# Patient Record
Sex: MALE | Race: WHITE | Employment: FULL TIME | URBAN - METROPOLITAN AREA
[De-identification: names, ages, dates, MRNs, and addresses within clinical notes are randomized per-mention and may not be internally consistent; named-entity substitution may affect disease eponyms.]

---

## 2017-07-07 ENCOUNTER — HOSPITAL ENCOUNTER (EMERGENCY)
Age: 22
Discharge: HOME | End: 2017-07-08
Attending: EMERGENCY MEDICINE
Payer: MEDICARE

## 2017-07-07 DIAGNOSIS — I49.3 FREQUENT PVCS: Primary | ICD-10-CM

## 2017-07-07 LAB
ABSOLUTE EOS #: 0.4 K/UL (ref 0–0.4)
ABSOLUTE LYMPH #: 3 K/UL (ref 1–4.8)
ABSOLUTE MONO #: 0.9 K/UL (ref 0.1–1.2)
BASOPHILS # BLD: 1 %
BASOPHILS ABSOLUTE: 0.1 K/UL (ref 0–0.2)
DIFFERENTIAL TYPE: ABNORMAL
EOSINOPHILS RELATIVE PERCENT: 5 %
HCT VFR BLD CALC: 47.9 % (ref 41–53)
HEMOGLOBIN: 16.3 G/DL (ref 13.5–17.5)
LYMPHOCYTES # BLD: 35 %
MCH RBC QN AUTO: 27.1 PG (ref 26–34)
MCHC RBC AUTO-ENTMCNC: 33.9 G/DL (ref 31–37)
MCV RBC AUTO: 79.9 FL (ref 80–100)
MONOCYTES # BLD: 11 %
PDW BLD-RTO: 13 % (ref 12.5–15.4)
PLATELET # BLD: 187 K/UL (ref 140–450)
PLATELET ESTIMATE: ABNORMAL
PMV BLD AUTO: 9.3 FL (ref 6–12)
RBC # BLD: 6 M/UL (ref 4.5–5.9)
RBC # BLD: ABNORMAL 10*6/UL
SEG NEUTROPHILS: 48 %
SEGMENTED NEUTROPHILS ABSOLUTE COUNT: 4.2 K/UL (ref 1.8–7.7)
WBC # BLD: 8.5 K/UL (ref 3.5–11)
WBC # BLD: ABNORMAL 10*3/UL

## 2017-07-07 PROCEDURE — 93005 ELECTROCARDIOGRAM TRACING: CPT

## 2017-07-07 PROCEDURE — 99285 EMERGENCY DEPT VISIT HI MDM: CPT

## 2017-07-07 PROCEDURE — 6370000000 HC RX 637 (ALT 250 FOR IP): Performed by: EMERGENCY MEDICINE

## 2017-07-07 PROCEDURE — 36415 COLL VENOUS BLD VENIPUNCTURE: CPT

## 2017-07-07 PROCEDURE — 85025 COMPLETE CBC W/AUTO DIFF WBC: CPT

## 2017-07-07 PROCEDURE — 96360 HYDRATION IV INFUSION INIT: CPT

## 2017-07-07 PROCEDURE — 84484 ASSAY OF TROPONIN QUANT: CPT

## 2017-07-07 PROCEDURE — 2580000003 HC RX 258: Performed by: EMERGENCY MEDICINE

## 2017-07-07 PROCEDURE — 80048 BASIC METABOLIC PNL TOTAL CA: CPT

## 2017-07-07 PROCEDURE — 83874 ASSAY OF MYOGLOBIN: CPT

## 2017-07-07 RX ORDER — ASPIRIN 81 MG/1
324 TABLET, CHEWABLE ORAL ONCE
Status: COMPLETED | OUTPATIENT
Start: 2017-07-07 | End: 2017-07-07

## 2017-07-07 RX ORDER — 0.9 % SODIUM CHLORIDE 0.9 %
1000 INTRAVENOUS SOLUTION INTRAVENOUS ONCE
Status: COMPLETED | OUTPATIENT
Start: 2017-07-08 | End: 2017-07-08

## 2017-07-07 RX ADMIN — ASPIRIN 81 MG 324 MG: 81 TABLET ORAL at 23:46

## 2017-07-07 RX ADMIN — SODIUM CHLORIDE 1000 ML: 9 INJECTION, SOLUTION INTRAVENOUS at 23:50

## 2017-07-07 ASSESSMENT — ENCOUNTER SYMPTOMS
NAUSEA: 0
BACK PAIN: 0
EYE DISCHARGE: 0
DIARRHEA: 0
EYE REDNESS: 0
CONSTIPATION: 0
SHORTNESS OF BREATH: 0
WHEEZING: 0
RHINORRHEA: 0
COUGH: 0
COLOR CHANGE: 0
ABDOMINAL PAIN: 0
EYE PAIN: 0
SORE THROAT: 0
CHEST TIGHTNESS: 0

## 2017-07-08 ENCOUNTER — APPOINTMENT (OUTPATIENT)
Dept: GENERAL RADIOLOGY | Age: 22
End: 2017-07-08
Payer: MEDICARE

## 2017-07-08 VITALS
SYSTOLIC BLOOD PRESSURE: 128 MMHG | DIASTOLIC BLOOD PRESSURE: 60 MMHG | BODY MASS INDEX: 33.13 KG/M2 | HEART RATE: 62 BPM | WEIGHT: 250 LBS | HEIGHT: 73 IN | RESPIRATION RATE: 23 BRPM | OXYGEN SATURATION: 92 % | TEMPERATURE: 98.8 F

## 2017-07-08 LAB
ANION GAP SERPL CALCULATED.3IONS-SCNC: 17 MMOL/L (ref 9–17)
BUN BLDV-MCNC: 10 MG/DL (ref 6–20)
BUN/CREAT BLD: ABNORMAL (ref 9–20)
CALCIUM SERPL-MCNC: 9 MG/DL (ref 8.6–10.4)
CHLORIDE BLD-SCNC: 102 MMOL/L (ref 98–107)
CO2: 25 MMOL/L (ref 20–31)
CREAT SERPL-MCNC: 0.88 MG/DL (ref 0.7–1.2)
EKG ATRIAL RATE: 49 BPM
EKG ATRIAL RATE: 72 BPM
EKG P AXIS: 42 DEGREES
EKG P AXIS: 43 DEGREES
EKG P-R INTERVAL: 126 MS
EKG P-R INTERVAL: 146 MS
EKG Q-T INTERVAL: 390 MS
EKG Q-T INTERVAL: 438 MS
EKG QRS DURATION: 112 MS
EKG QRS DURATION: 116 MS
EKG QTC CALCULATION (BAZETT): 395 MS
EKG QTC CALCULATION (BAZETT): 427 MS
EKG R AXIS: 28 DEGREES
EKG R AXIS: 39 DEGREES
EKG T AXIS: 48 DEGREES
EKG T AXIS: 69 DEGREES
EKG VENTRICULAR RATE: 49 BPM
EKG VENTRICULAR RATE: 72 BPM
GFR AFRICAN AMERICAN: >60 ML/MIN
GFR NON-AFRICAN AMERICAN: >60 ML/MIN
GFR SERPL CREATININE-BSD FRML MDRD: ABNORMAL ML/MIN/{1.73_M2}
GFR SERPL CREATININE-BSD FRML MDRD: ABNORMAL ML/MIN/{1.73_M2}
GLUCOSE BLD-MCNC: 103 MG/DL (ref 70–99)
MYOGLOBIN: 36 NG/ML (ref 28–72)
MYOGLOBIN: 57 NG/ML (ref 28–72)
POTASSIUM SERPL-SCNC: 4 MMOL/L (ref 3.7–5.3)
SODIUM BLD-SCNC: 144 MMOL/L (ref 135–144)
TROPONIN INTERP: NORMAL
TROPONIN INTERP: NORMAL
TROPONIN T: <0.03 NG/ML
TROPONIN T: <0.03 NG/ML

## 2017-07-08 PROCEDURE — 36415 COLL VENOUS BLD VENIPUNCTURE: CPT

## 2017-07-08 PROCEDURE — 93005 ELECTROCARDIOGRAM TRACING: CPT

## 2017-07-08 PROCEDURE — 83874 ASSAY OF MYOGLOBIN: CPT

## 2017-07-08 PROCEDURE — 71010 XR CHEST PORTABLE: CPT

## 2017-07-08 PROCEDURE — 2580000003 HC RX 258: Performed by: EMERGENCY MEDICINE

## 2017-07-08 PROCEDURE — 96361 HYDRATE IV INFUSION ADD-ON: CPT

## 2017-07-08 PROCEDURE — 84484 ASSAY OF TROPONIN QUANT: CPT

## 2017-07-08 RX ORDER — 0.9 % SODIUM CHLORIDE 0.9 %
1000 INTRAVENOUS SOLUTION INTRAVENOUS ONCE
Status: COMPLETED | OUTPATIENT
Start: 2017-07-08 | End: 2017-07-08

## 2017-07-08 RX ADMIN — SODIUM CHLORIDE 1000 ML: 9 INJECTION, SOLUTION INTRAVENOUS at 00:53

## 2017-07-14 ENCOUNTER — HOSPITAL ENCOUNTER (EMERGENCY)
Age: 22
Discharge: HOME | End: 2017-07-14
Attending: EMERGENCY MEDICINE
Payer: MEDICARE

## 2017-07-14 VITALS
HEART RATE: 94 BPM | TEMPERATURE: 99.7 F | WEIGHT: 250 LBS | DIASTOLIC BLOOD PRESSURE: 83 MMHG | BODY MASS INDEX: 33.13 KG/M2 | RESPIRATION RATE: 18 BRPM | OXYGEN SATURATION: 100 % | SYSTOLIC BLOOD PRESSURE: 146 MMHG | HEIGHT: 73 IN

## 2017-07-14 DIAGNOSIS — J02.9 ACUTE PHARYNGITIS, UNSPECIFIED ETIOLOGY: Primary | ICD-10-CM

## 2017-07-14 LAB
DIRECT EXAM: NORMAL
Lab: NORMAL
SPECIMEN DESCRIPTION: NORMAL
STATUS: NORMAL

## 2017-07-14 PROCEDURE — 87651 STREP A DNA AMP PROBE: CPT

## 2017-07-14 PROCEDURE — 6370000000 HC RX 637 (ALT 250 FOR IP): Performed by: EMERGENCY MEDICINE

## 2017-07-14 PROCEDURE — 99283 EMERGENCY DEPT VISIT LOW MDM: CPT

## 2017-07-14 RX ORDER — IBUPROFEN 600 MG/1
600 TABLET ORAL ONCE
Status: COMPLETED | OUTPATIENT
Start: 2017-07-14 | End: 2017-07-14

## 2017-07-14 RX ORDER — PREDNISONE 20 MG/1
60 TABLET ORAL ONCE
Status: COMPLETED | OUTPATIENT
Start: 2017-07-14 | End: 2017-07-14

## 2017-07-14 RX ADMIN — IBUPROFEN 600 MG: 600 TABLET, FILM COATED ORAL at 23:03

## 2017-07-14 RX ADMIN — PREDNISONE 60 MG: 20 TABLET ORAL at 23:18

## 2017-07-14 ASSESSMENT — PAIN DESCRIPTION - DESCRIPTORS: DESCRIPTORS: SORE

## 2017-07-14 ASSESSMENT — PAIN SCALES - GENERAL: PAINLEVEL_OUTOF10: 5

## 2017-07-14 ASSESSMENT — PAIN DESCRIPTION - LOCATION: LOCATION: THROAT;BACK

## 2017-07-16 LAB
DIRECT EXAM: NORMAL
DIRECT EXAM: NORMAL
Lab: NORMAL
SPECIMEN DESCRIPTION: NORMAL
SPECIMEN DESCRIPTION: NORMAL
STATUS: NORMAL

## 2017-07-16 ASSESSMENT — ENCOUNTER SYMPTOMS
COLOR CHANGE: 0
CHEST TIGHTNESS: 0
DIARRHEA: 0
COUGH: 0
SHORTNESS OF BREATH: 0
RHINORRHEA: 0
WHEEZING: 0
EYE REDNESS: 0
EYE PAIN: 0
EYE DISCHARGE: 0
ABDOMINAL PAIN: 0
NAUSEA: 0
BACK PAIN: 0
SORE THROAT: 1
CONSTIPATION: 0

## 2017-11-15 ENCOUNTER — HOSPITAL ENCOUNTER (EMERGENCY)
Age: 22
Discharge: HOME | End: 2017-11-15
Attending: EMERGENCY MEDICINE
Payer: MEDICARE

## 2017-11-15 VITALS
WEIGHT: 244 LBS | HEIGHT: 73 IN | SYSTOLIC BLOOD PRESSURE: 157 MMHG | DIASTOLIC BLOOD PRESSURE: 80 MMHG | HEART RATE: 67 BPM | OXYGEN SATURATION: 96 % | TEMPERATURE: 98.4 F | RESPIRATION RATE: 16 BRPM | BODY MASS INDEX: 32.34 KG/M2

## 2017-11-15 DIAGNOSIS — R10.31 RIGHT GROIN PAIN: Primary | ICD-10-CM

## 2017-11-15 PROCEDURE — 99283 EMERGENCY DEPT VISIT LOW MDM: CPT

## 2017-11-15 PROCEDURE — 6370000000 HC RX 637 (ALT 250 FOR IP): Performed by: PHYSICIAN ASSISTANT

## 2017-11-15 RX ORDER — ASPIRIN 81 MG/1
81 TABLET, CHEWABLE ORAL DAILY
COMMUNITY
End: 2018-09-14

## 2017-11-15 RX ORDER — IBUPROFEN 600 MG/1
600 TABLET ORAL ONCE
Status: COMPLETED | OUTPATIENT
Start: 2017-11-15 | End: 2017-11-15

## 2017-11-15 RX ADMIN — IBUPROFEN 600 MG: 600 TABLET, FILM COATED ORAL at 14:24

## 2017-11-15 ASSESSMENT — ENCOUNTER SYMPTOMS
EYE DISCHARGE: 0
VOMITING: 0
COUGH: 0
SORE THROAT: 0
NAUSEA: 0
SHORTNESS OF BREATH: 0
BACK PAIN: 0
ABDOMINAL PAIN: 0
EYE REDNESS: 0

## 2017-11-15 ASSESSMENT — PAIN DESCRIPTION - ORIENTATION
ORIENTATION: RIGHT
ORIENTATION: RIGHT

## 2017-11-15 ASSESSMENT — PAIN DESCRIPTION - PAIN TYPE: TYPE: ACUTE PAIN

## 2017-11-15 ASSESSMENT — PAIN DESCRIPTION - LOCATION
LOCATION: GROIN
LOCATION: GROIN

## 2017-11-15 ASSESSMENT — PAIN SCALES - GENERAL
PAINLEVEL_OUTOF10: 6
PAINLEVEL_OUTOF10: 6

## 2017-11-15 NOTE — ED NOTES
PATIENT CLEARED FOR D/C PER Beto Carmona DO.  I HAVE REVIEWED THE PROVIDER'S INSTRUCTIONS WITH THE PATIENT, ANSWERING ALL QUESTIONS TO his SATISFACTION, RECOMMENDED F/U APPT  GIVEN TO him AND he VERBALIZES UNDERSTANDING OF INSTRUCTIONS.   D/C CONDITION IMPROVED/STABLE TO [facility] ED WITH INSTRUCTIONS       Sue Marcano RN  11/15/17 0979

## 2017-11-15 NOTE — ED PROVIDER NOTES
[facility] ED  [address] [facility] [address]  Phone: [phone]  Fax: [phone]      Pt Name: Stephen Godoy  MRN: [id number]  [place] 1995  Date of evaluation: 11/15/2017      CHIEF COMPLAINT       Chief Complaint   Patient presents with    Hematoma       HISTORY OF PRESENT ILLNESS   (Location, Quality, Severity, Duration, Timing, Context, Modifying Factors, Associated Signs and Symptoms)     Stephen Godoy is a 25 y.o. male who presents to the ER for evaluation of a tender lump in the right groin. Patient had a history of frequent PVCs. He underwent cardiac ablation at the [facility] on 11/10/17. Patient states that he has noticed a tender lump in the right groin. He believes that the lump is increasing in size. He expresses concern for possible blood clot in the leg. Patient denies chest pain and difficulty breathing. There has been no swelling in the lower extremities. Patient is a nonsmoker. He has no history of DVT or PE. Patient is currently on aspirin 81 mg daily. He rates his current right groin discomfort at 6-7/10. He states that he took Tylenol for the groin pain around 10:30 AM this morning with no relief. Nursing Notes were reviewed. REVIEW OF SYSTEMS     (2-9 systems for level 4, 10 or more for level 5)    Review of Systems   Constitutional: Negative for appetite change, chills and fever. HENT: Negative for congestion, ear pain and sore throat. Eyes: Negative for discharge and redness. Respiratory: Negative for cough and shortness of breath. Cardiovascular: Negative for chest pain. Gastrointestinal: Negative for abdominal pain, nausea and vomiting. Genitourinary: Negative for dysuria and frequency. Musculoskeletal: Negative for back pain and neck pain. Groin pain. Skin: Negative for rash. Neurological: Negative for seizures and syncope.    Psychiatric/Behavioral: Negative for self-injury and suicidal Medications    ibuprofen (ADVIL;MOTRIN) tablet 600 mg      Re-evaluation Notes  2:26 PM.  Call placed to [address] [place] [address]. 2:35 PM.  Patient was discussed with Dr. Yvonne Obando. He has accepted the patient for ER to ER transfer. FINAL IMPRESSION      1. Right groin pain        DISPOSITION/PLAN   DISPOSITION Decision to Transfer-     Condition on Disposition  Stable    PATIENT REFERRED TO:  No follow-up provider specified.     DISCHARGE MEDICATIONS:  New Prescriptions    No medications on file     (Please note that portions of this note were completed with a voice recognition program.  Efforts were made to edit the dictations but occasionally words are mis-transcribed.)    Bismark Sullivan PA-C  11/15/17 0232

## 2017-11-15 NOTE — ED PROVIDER NOTES
Emergency Department     Faculty Attestation    I performed a history and physical examination of the patient and discussed management with the mid level provideer. I reviewed the mid level provider's note and agree with the documented findings and plan of care. Any areas of disagreement are noted on the chart. I was personally present for the key portions of any procedures. I have documented in the chart those procedures where I was not present during the key portions. I have reviewed the emergency nurses triage note. I agree with the chief complaint, past medical history, past surgical history, allergies, medications, social and family history as documented unless otherwise noted below. Documentation of the HPI, Physical Exam and Medical Decision Making performed by medical students or scribes is based on my personal performance of the HPI, PE and MDM. For Physician Assistant/ Nurse Practitioner cases/documentation I have personally evaluated this patient and have completed at least one if not all key elements of the E/M (history, physical exam, and MDM). Additional findings are as noted. CHIEF COMPLAINT       Chief Complaint   Patient presents with    Hematoma      PHYSICAL EXAM      Constitutional: Alert, oriented x3, nontoxic, answering questions appropriately, acting properly for age, in no acute distress   HEENT: Extraocular muscles intact, mucous membranes moist.     Neck: Trachea midline,    Musculoskeletal: Right lower extremity ecchymosis of the right inguinal area with a 4 cm mass appreciated over the femoral artery Nonpulsatile. No bruit. Moderately tender. Pedal pulses intact distally.   Neurologic: Moving all 4 extremities without difficulty   Skin: Warm and dry     DIAGNOSTIC RESULTS     EKG: All EKG's are interpreted by the Emergency Department Physician who either signs or Co-signs this chart in the absence of a cardiologist.    Not indicated unless otherwise documented above

## 2017-12-19 ENCOUNTER — HOSPITAL ENCOUNTER (OUTPATIENT)
Dept: MRI IMAGING | Age: 22
Discharge: HOME | End: 2017-12-19
Payer: MEDICARE

## 2017-12-19 DIAGNOSIS — M54.50 CHRONIC LEFT-SIDED LOW BACK PAIN WITHOUT SCIATICA: ICD-10-CM

## 2017-12-19 DIAGNOSIS — G89.29 CHRONIC LEFT-SIDED LOW BACK PAIN WITHOUT SCIATICA: ICD-10-CM

## 2017-12-19 PROCEDURE — 72148 MRI LUMBAR SPINE W/O DYE: CPT

## 2018-01-22 ENCOUNTER — HOSPITAL ENCOUNTER (EMERGENCY)
Age: 23
Discharge: HOME | End: 2018-01-23
Attending: EMERGENCY MEDICINE
Payer: MEDICARE

## 2018-01-22 VITALS
WEIGHT: 255 LBS | HEIGHT: 73 IN | RESPIRATION RATE: 16 BRPM | HEART RATE: 76 BPM | BODY MASS INDEX: 33.8 KG/M2 | TEMPERATURE: 100.9 F | OXYGEN SATURATION: 97 % | SYSTOLIC BLOOD PRESSURE: 149 MMHG | DIASTOLIC BLOOD PRESSURE: 78 MMHG

## 2018-01-22 DIAGNOSIS — J06.9 ACUTE UPPER RESPIRATORY INFECTION: Primary | ICD-10-CM

## 2018-01-22 DIAGNOSIS — R52 BODY ACHES: ICD-10-CM

## 2018-01-22 DIAGNOSIS — J02.9 SORE THROAT: ICD-10-CM

## 2018-01-22 PROCEDURE — 99283 EMERGENCY DEPT VISIT LOW MDM: CPT

## 2018-01-22 PROCEDURE — 87804 INFLUENZA ASSAY W/OPTIC: CPT

## 2018-01-22 PROCEDURE — 87880 STREP A ASSAY W/OPTIC: CPT

## 2018-01-22 ASSESSMENT — PAIN SCALES - GENERAL: PAINLEVEL_OUTOF10: 6

## 2018-01-22 ASSESSMENT — PAIN DESCRIPTION - DESCRIPTORS: DESCRIPTORS: ACHING

## 2018-01-22 ASSESSMENT — PAIN DESCRIPTION - LOCATION: LOCATION: BACK;THROAT

## 2018-01-23 LAB
DIRECT EXAM: NORMAL
Lab: NORMAL
SPECIMEN DESCRIPTION: NORMAL
STATUS: NORMAL

## 2018-01-23 PROCEDURE — 6360000002 HC RX W HCPCS: Performed by: EMERGENCY MEDICINE

## 2018-01-23 PROCEDURE — 6370000000 HC RX 637 (ALT 250 FOR IP): Performed by: EMERGENCY MEDICINE

## 2018-01-23 PROCEDURE — 87651 STREP A DNA AMP PROBE: CPT

## 2018-01-23 PROCEDURE — 96372 THER/PROPH/DIAG INJ SC/IM: CPT

## 2018-01-23 RX ORDER — BENZONATATE 100 MG/1
100 CAPSULE ORAL 3 TIMES DAILY PRN
Qty: 30 CAPSULE | Refills: 0 | Status: SHIPPED | OUTPATIENT
Start: 2018-01-23 | End: 2018-01-30

## 2018-01-23 RX ORDER — KETOROLAC TROMETHAMINE 30 MG/ML
30 INJECTION, SOLUTION INTRAMUSCULAR; INTRAVENOUS ONCE
Status: COMPLETED | OUTPATIENT
Start: 2018-01-23 | End: 2018-01-23

## 2018-01-23 RX ORDER — BENZONATATE 100 MG/1
100 CAPSULE ORAL ONCE
Status: COMPLETED | OUTPATIENT
Start: 2018-01-23 | End: 2018-01-23

## 2018-01-23 RX ORDER — DEXAMETHASONE SODIUM PHOSPHATE 10 MG/ML
6 INJECTION INTRAMUSCULAR; INTRAVENOUS ONCE
Status: COMPLETED | OUTPATIENT
Start: 2018-01-23 | End: 2018-01-23

## 2018-01-23 RX ADMIN — BENZONATATE 100 MG: 100 CAPSULE ORAL at 01:07

## 2018-01-23 RX ADMIN — KETOROLAC TROMETHAMINE 30 MG: 30 INJECTION, SOLUTION INTRAMUSCULAR at 01:06

## 2018-01-23 RX ADMIN — DEXAMETHASONE SODIUM PHOSPHATE 6 MG: 10 INJECTION INTRAMUSCULAR; INTRAVENOUS at 01:07

## 2018-01-23 ASSESSMENT — ENCOUNTER SYMPTOMS
COUGH: 1
EYE DISCHARGE: 0
SHORTNESS OF BREATH: 0
ABDOMINAL DISTENTION: 0
FACIAL SWELLING: 0
ABDOMINAL PAIN: 0
EYE REDNESS: 0
CHEST TIGHTNESS: 0
SORE THROAT: 1

## 2018-01-23 ASSESSMENT — PAIN SCALES - GENERAL: PAINLEVEL_OUTOF10: 6

## 2018-01-23 NOTE — ED PROVIDER NOTES
[address] [facility] ED  eMERGENCY dEPARTMENT eNCOUnter      Pt Name: Rossi Beltran  MRN: [id number]  [place] 1995  Date of evaluation: 1/22/2018  Provider: [address]       Chief Complaint   Patient presents with    Influenza     fever, H.A., congested, bodyaches         HISTORY OF PRESENT ILLNESS  (Location/Symptom, Timing/Onset, Context/Setting, Quality, Duration, Modifying Factors, Severity.)   Rossi Beltran is a 25 y.o. male who presents to the emergency department Patient complaining of 3-4 days of flulike symptoms. The patient is complaining of cough congestion fever and bodyaches. The patient is taking over-the-counter medications with only mild relief. The patient denies any shortness of breath or chest pain. He has a nonproductive cough. The patient is able to eat and drink however it hurts his throat to swallow. The patient is making normal amount of urine and states his pain is an aching in his throat and a 6 out of 10. Patient is nontoxic in appearance and appears well-hydrated. Nursing Notes were reviewed. REVIEW OF SYSTEMS    (2-9 systems for level 4, 10 or more for level 5)     Review of Systems   Constitutional: Negative for chills and fatigue. HENT: Positive for sore throat. Negative for facial swelling. Eyes: Negative for discharge and redness. Respiratory: Positive for cough. Negative for chest tightness and shortness of breath. Cardiovascular: Negative for chest pain and palpitations. Gastrointestinal: Negative for abdominal distention and abdominal pain. Genitourinary: Negative for dysuria and hematuria. Musculoskeletal: Positive for myalgias. Negative for gait problem, joint swelling and neck pain. Skin: Negative for pallor and rash. Neurological: Negative for speech difficulty and headaches. All other systems reviewed and are negative.      Except as noted above the remainder of the review of systems was

## 2018-09-14 ENCOUNTER — HOSPITAL ENCOUNTER (EMERGENCY)
Age: 23
Discharge: HOME | End: 2018-09-14
Attending: EMERGENCY MEDICINE
Payer: MEDICARE

## 2018-09-14 VITALS
DIASTOLIC BLOOD PRESSURE: 83 MMHG | RESPIRATION RATE: 14 BRPM | HEART RATE: 95 BPM | HEIGHT: 73 IN | OXYGEN SATURATION: 97 % | SYSTOLIC BLOOD PRESSURE: 152 MMHG | TEMPERATURE: 98.1 F | BODY MASS INDEX: 33.13 KG/M2 | WEIGHT: 250 LBS

## 2018-09-14 DIAGNOSIS — J02.9 SORE THROAT: Primary | ICD-10-CM

## 2018-09-14 LAB
DIRECT EXAM: NORMAL
Lab: NORMAL
SPECIMEN DESCRIPTION: NORMAL
STATUS: NORMAL

## 2018-09-14 PROCEDURE — 6370000000 HC RX 637 (ALT 250 FOR IP): Performed by: PHYSICIAN ASSISTANT

## 2018-09-14 PROCEDURE — 99284 EMERGENCY DEPT VISIT MOD MDM: CPT

## 2018-09-14 PROCEDURE — 87880 STREP A ASSAY W/OPTIC: CPT

## 2018-09-14 RX ORDER — PREDNISONE 10 MG/1
40 TABLET ORAL DAILY
Qty: 12 TABLET | Refills: 0 | Status: SHIPPED | OUTPATIENT
Start: 2018-09-14 | End: 2018-09-14 | Stop reason: CLARIF

## 2018-09-14 RX ORDER — AMOXICILLIN 500 MG/1
500 CAPSULE ORAL 2 TIMES DAILY
Qty: 20 CAPSULE | Refills: 0 | Status: SHIPPED | OUTPATIENT
Start: 2018-09-14 | End: 2018-09-24

## 2018-09-14 RX ORDER — IBUPROFEN 800 MG/1
800 TABLET ORAL ONCE
Status: COMPLETED | OUTPATIENT
Start: 2018-09-14 | End: 2018-09-14

## 2018-09-14 RX ADMIN — IBUPROFEN 800 MG: 800 TABLET ORAL at 18:02

## 2018-09-14 ASSESSMENT — ENCOUNTER SYMPTOMS
SORE THROAT: 1
EYE REDNESS: 0
EYE DISCHARGE: 0
VOMITING: 0
SHORTNESS OF BREATH: 0
ABDOMINAL PAIN: 0
RHINORRHEA: 0
COUGH: 0
BACK PAIN: 0
NAUSEA: 0

## 2018-09-14 ASSESSMENT — PAIN SCALES - GENERAL
PAINLEVEL_OUTOF10: 6
PAINLEVEL_OUTOF10: 7

## 2018-09-14 ASSESSMENT — PAIN DESCRIPTION - PAIN TYPE: TYPE: ACUTE PAIN

## 2018-09-14 ASSESSMENT — PAIN DESCRIPTION - LOCATION: LOCATION: THROAT

## 2018-09-14 NOTE — ED PROVIDER NOTES
[facility]  [address] [address]  Phone: [phone]  Fax: [phone]      Pt Name: Anuradha Lima  MRN: [id number]  [place] 1995  Date of evaluation: 9/14/2018      CHIEF COMPLAINT       Chief Complaint   Patient presents with    Pharyngitis     x3 days       HISTORY OF PRESENT ILLNESS   (Location, Quality, Severity, Duration, Timing, Context, Modifying Factors, Associated Signs and Symptoms)     Anuradha Lima is a 21 y.o. male who presents to the ER for evaluation of sore throat and headache. Patient states that he has had symptoms over the past 3 days. His fiancé has been sick with similar symptoms. Patient has had no nasal congestion or cough. He states that he has felt warm followed by chills. He denies abdominal pain, nausea and vomiting. Patient took Excedrin for his sore throat and headache pain with no relief. He rates his acute pain at 6/10. Nursing Notes were reviewed. REVIEW OF SYSTEMS     (2-9 systems for level 4, 10 or more for level 5)    Review of Systems   Constitutional: Positive for chills. Negative for appetite change. HENT: Positive for sore throat. Negative for congestion, ear discharge, ear pain and rhinorrhea. Eyes: Negative for discharge and redness. Respiratory: Negative for cough and shortness of breath. Cardiovascular: Negative for chest pain. Gastrointestinal: Negative for abdominal pain, nausea and vomiting. Genitourinary: Negative for decreased urine volume. Musculoskeletal: Negative for back pain, neck pain and neck stiffness. Skin: Negative for rash. Neurological: Negative for seizures and syncope. All other systems reviewed and are negative. PAST MEDICAL HISTORY    has a past medical history of Back pain; Drug-induced mood disorder ([facility] [address] PVC (premature ventricular contraction); Seasonal allergies; and Torn earlobe.     SURGICAL HISTORY      has a past surgical history that includes
10 mg by mouth nightly. SODIUM CHLORIDE (OCEAN, BABY AYR) 0.65 % NASAL SPRAY    2 sprays by Nasal route as needed for Congestion       ALLERGIES     is allergic to cat hair extract. FAMILY HISTORY     indicated that his mother is alive. He indicated that his father is alive. He indicated that the status of his maternal grandfather is unknown. He indicated that the status of his paternal grandfather is unknown.      family history includes Coronary Art Dis in his maternal grandfather and paternal grandfather. SOCIAL HISTORY      reports that he quit smoking about 2 years ago. His smoking use included Cigarettes. He started smoking about 5 years ago. He has a 1.00 pack-year smoking history. He has quit using smokeless tobacco. His smokeless tobacco use included Snuff and Chew. He reports that he drinks alcohol. He reports that he uses drugs, including Cocaine, Marijuana, Opiates , and Other-see comments. DIAGNOSTIC RESULTS     EKG: All EKG's are interpreted by the Emergency Department Physician who either signs or Co-signs this chart in the absence of a cardiologist.      RADIOLOGY:   Non-plain film images such as CT, Ultrasound and MRI are read by the radiologist. Plain radiographic images are visualized and the radiologist interpretations are reviewed as follows: No orders to display       No results found. LABS:  Results for orders placed or performed during the hospital encounter of 09/14/18   Strep Screen Group A Throat   Result Value Ref Range    Specimen Description . THROAT     Special Requests NOT REPORTED     Direct Exam       Rapid Strep A negative. A negative Rapid Group A Strep Screen result does not    Direct Exam        rule out the possibility of Group A Streptococci in the specimen.  A Group A    Direct Exam  Strep DNA test is available upon request.     Status FINAL 09/14/2018          EMERGENCY DEPARTMENT COURSE:   Vitals:    Vitals:    09/14/18 1744   BP: (!) 152/83   Pulse: 95

## 2018-10-09 ENCOUNTER — HOSPITAL ENCOUNTER (EMERGENCY)
Age: 23
Discharge: HOME | End: 2018-10-09
Attending: SPECIALIST
Payer: MEDICARE

## 2018-10-09 VITALS
TEMPERATURE: 98.8 F | BODY MASS INDEX: 33.13 KG/M2 | SYSTOLIC BLOOD PRESSURE: 133 MMHG | RESPIRATION RATE: 17 BRPM | OXYGEN SATURATION: 95 % | DIASTOLIC BLOOD PRESSURE: 68 MMHG | WEIGHT: 250 LBS | HEART RATE: 65 BPM | HEIGHT: 73 IN

## 2018-10-09 DIAGNOSIS — K13.79 UVULAR EDEMA: Primary | ICD-10-CM

## 2018-10-09 LAB
ABSOLUTE EOS #: 0.2 K/UL (ref 0–0.4)
ABSOLUTE IMMATURE GRANULOCYTE: ABNORMAL K/UL (ref 0–0.3)
ABSOLUTE LYMPH #: 1.5 K/UL (ref 1–4.8)
ABSOLUTE MONO #: 0.7 K/UL (ref 0.1–1.2)
ANION GAP SERPL CALCULATED.3IONS-SCNC: 14 MMOL/L (ref 9–17)
BASOPHILS # BLD: 1 % (ref 0–2)
BASOPHILS ABSOLUTE: 0.1 K/UL (ref 0–0.2)
BUN BLDV-MCNC: 12 MG/DL (ref 6–20)
BUN/CREAT BLD: ABNORMAL (ref 9–20)
CALCIUM SERPL-MCNC: 9.5 MG/DL (ref 8.6–10.4)
CHLORIDE BLD-SCNC: 104 MMOL/L (ref 98–107)
CO2: 22 MMOL/L (ref 20–31)
CREAT SERPL-MCNC: 0.82 MG/DL (ref 0.7–1.2)
DIFFERENTIAL TYPE: ABNORMAL
DIRECT EXAM: NORMAL
EOSINOPHILS RELATIVE PERCENT: 3 % (ref 1–4)
GFR AFRICAN AMERICAN: >60 ML/MIN
GFR NON-AFRICAN AMERICAN: >60 ML/MIN
GFR SERPL CREATININE-BSD FRML MDRD: ABNORMAL ML/MIN/{1.73_M2}
GFR SERPL CREATININE-BSD FRML MDRD: ABNORMAL ML/MIN/{1.73_M2}
GLUCOSE BLD-MCNC: 108 MG/DL (ref 70–99)
HCT VFR BLD CALC: 48.2 % (ref 41–53)
HEMOGLOBIN: 16.3 G/DL (ref 13.5–17.5)
IMMATURE GRANULOCYTES: ABNORMAL %
LYMPHOCYTES # BLD: 20 % (ref 24–44)
Lab: NORMAL
MCH RBC QN AUTO: 27.2 PG (ref 26–34)
MCHC RBC AUTO-ENTMCNC: 33.7 G/DL (ref 31–37)
MCV RBC AUTO: 80.5 FL (ref 80–100)
MONOCYTES # BLD: 9 % (ref 2–11)
NRBC AUTOMATED: ABNORMAL PER 100 WBC
PDW BLD-RTO: 12.9 % (ref 12.5–15.4)
PLATELET # BLD: 199 K/UL (ref 140–450)
PLATELET ESTIMATE: ABNORMAL
PMV BLD AUTO: 9.3 FL (ref 6–12)
POTASSIUM SERPL-SCNC: 4.4 MMOL/L (ref 3.7–5.3)
RBC # BLD: 5.99 M/UL (ref 4.5–5.9)
RBC # BLD: ABNORMAL 10*6/UL
SEG NEUTROPHILS: 67 % (ref 36–66)
SEGMENTED NEUTROPHILS ABSOLUTE COUNT: 5.1 K/UL (ref 1.8–7.7)
SODIUM BLD-SCNC: 140 MMOL/L (ref 135–144)
SPECIMEN DESCRIPTION: NORMAL
STATUS: NORMAL
WBC # BLD: 7.6 K/UL (ref 3.5–11)
WBC # BLD: ABNORMAL 10*3/UL

## 2018-10-09 PROCEDURE — S0028 INJECTION, FAMOTIDINE, 20 MG: HCPCS | Performed by: SPECIALIST

## 2018-10-09 PROCEDURE — 87880 STREP A ASSAY W/OPTIC: CPT

## 2018-10-09 PROCEDURE — 96372 THER/PROPH/DIAG INJ SC/IM: CPT

## 2018-10-09 PROCEDURE — 2580000003 HC RX 258: Performed by: SPECIALIST

## 2018-10-09 PROCEDURE — 96374 THER/PROPH/DIAG INJ IV PUSH: CPT

## 2018-10-09 PROCEDURE — 6360000002 HC RX W HCPCS: Performed by: SPECIALIST

## 2018-10-09 PROCEDURE — 2500000003 HC RX 250 WO HCPCS: Performed by: SPECIALIST

## 2018-10-09 PROCEDURE — 96375 TX/PRO/DX INJ NEW DRUG ADDON: CPT

## 2018-10-09 PROCEDURE — 80048 BASIC METABOLIC PNL TOTAL CA: CPT

## 2018-10-09 PROCEDURE — 85025 COMPLETE CBC W/AUTO DIFF WBC: CPT

## 2018-10-09 PROCEDURE — 6370000000 HC RX 637 (ALT 250 FOR IP): Performed by: SPECIALIST

## 2018-10-09 PROCEDURE — 36415 COLL VENOUS BLD VENIPUNCTURE: CPT

## 2018-10-09 PROCEDURE — 99284 EMERGENCY DEPT VISIT MOD MDM: CPT

## 2018-10-09 RX ORDER — METHYLPREDNISOLONE SODIUM SUCCINATE 125 MG/2ML
125 INJECTION, POWDER, LYOPHILIZED, FOR SOLUTION INTRAMUSCULAR; INTRAVENOUS ONCE
Status: COMPLETED | OUTPATIENT
Start: 2018-10-09 | End: 2018-10-09

## 2018-10-09 RX ORDER — PREDNISONE 20 MG/1
20 TABLET ORAL 2 TIMES DAILY
Qty: 10 TABLET | Refills: 0 | Status: SHIPPED | OUTPATIENT
Start: 2018-10-09 | End: 2018-10-14

## 2018-10-09 RX ORDER — FAMOTIDINE 20 MG/1
20 TABLET, FILM COATED ORAL 2 TIMES DAILY
Qty: 10 TABLET | Refills: 0 | Status: SHIPPED | OUTPATIENT
Start: 2018-10-09 | End: 2019-11-19

## 2018-10-09 RX ORDER — DIPHENHYDRAMINE HCL 25 MG
50 TABLET ORAL ONCE
Status: COMPLETED | OUTPATIENT
Start: 2018-10-09 | End: 2018-10-09

## 2018-10-09 RX ORDER — EPINEPHRINE 1 MG/ML
0.5 INJECTION, SOLUTION, CONCENTRATE INTRAVENOUS ONCE
Status: COMPLETED | OUTPATIENT
Start: 2018-10-09 | End: 2018-10-09

## 2018-10-09 RX ORDER — 0.9 % SODIUM CHLORIDE 0.9 %
500 INTRAVENOUS SOLUTION INTRAVENOUS ONCE
Status: COMPLETED | OUTPATIENT
Start: 2018-10-09 | End: 2018-10-09

## 2018-10-09 RX ADMIN — SODIUM CHLORIDE 500 ML: 9 INJECTION, SOLUTION INTRAVENOUS at 09:09

## 2018-10-09 RX ADMIN — METHYLPREDNISOLONE SODIUM SUCCINATE 125 MG: 125 INJECTION, POWDER, FOR SOLUTION INTRAMUSCULAR; INTRAVENOUS at 09:09

## 2018-10-09 RX ADMIN — FAMOTIDINE 20 MG: 10 INJECTION, SOLUTION INTRAVENOUS at 09:09

## 2018-10-09 RX ADMIN — DIPHENHYDRAMINE HCL 50 MG: 25 TABLET ORAL at 13:04

## 2018-10-09 RX ADMIN — EPINEPHRINE 0.5 MG: 1 INJECTION INTRAMUSCULAR; INTRAVENOUS; SUBCUTANEOUS at 11:36

## 2018-10-09 ASSESSMENT — ENCOUNTER SYMPTOMS
SHORTNESS OF BREATH: 0
COUGH: 0
VOMITING: 0
DIARRHEA: 0
SORE THROAT: 1
TROUBLE SWALLOWING: 1
WHEEZING: 0

## 2018-10-09 ASSESSMENT — PAIN SCALES - GENERAL: PAINLEVEL_OUTOF10: 6

## 2018-10-09 ASSESSMENT — PAIN DESCRIPTION - PAIN TYPE: TYPE: ACUTE PAIN

## 2018-10-09 ASSESSMENT — PAIN DESCRIPTION - DESCRIPTORS: DESCRIPTORS: SORE

## 2018-10-09 ASSESSMENT — PAIN DESCRIPTION - LOCATION: LOCATION: THROAT

## 2018-10-09 NOTE — ED NOTES
Pt to exam room with c/o sore and swollen throat. Pt stated that his throat was so swollen that his uvula stuck on top of his tongue this am. Pt is able to swallow his secretions and denies breathing difficulties. Pt was recently treated with amoxicillin for his sore throat.      Camilo Arreola RN  10/09/18 3720

## 2018-10-09 NOTE — ED PROVIDER NOTES
light. EOM are normal.   Neck: Normal range of motion. Neck supple. Cardiovascular: Normal rate, regular rhythm, normal heart sounds and intact distal pulses. No murmur heard. Pulmonary/Chest: Effort normal and breath sounds normal. No respiratory distress. Abdominal: Soft. Bowel sounds are normal. He exhibits no distension. There is no tenderness. Neurological: He is alert and oriented to person, place, and time. Skin: Skin is warm and dry. Nursing note and vitals reviewed. DIFFERENTIAL DIAGNOSIS/ MDM:     Streptococcal pharyngitis with uvulitis, viral pharyngitis, allergic uvulitis, angioedema    DIAGNOSTIC RESULTS     EKG: All EKG's are interpreted by the Emergency Department Physician who either signs or Co-signs this chart in the absence of a cardiologist.    None obtained    RADIOLOGY:   Non-plain film images such as CT, Ultrasound and MRI are read by the radiologist. Shi Colon radiographic images are visualized and the radiologist interpretations are reviewed as follows:     None obtained    LABS:  Results for orders placed or performed during the hospital encounter of 10/09/18   Strep Screen Group A Throat   Result Value Ref Range    Specimen Description . THROAT     Special Requests NOT REPORTED     Direct Exam       Rapid Strep A negative. A negative Rapid Group A Strep Screen result does not    Direct Exam        rule out the possibility of Group A Streptococci in the specimen.  A Group A    Direct Exam  Strep DNA test is available upon request.     Status FINAL 10/09/2018    CBC Auto Differential   Result Value Ref Range    WBC 7.6 3.5 - 11.0 k/uL    RBC 5.99 (H) 4.5 - 5.9 m/uL    Hemoglobin 16.3 13.5 - 17.5 g/dL    Hematocrit 48.2 41 - 53 %    MCV 80.5 80 - 100 fL    MCH 27.2 26 - 34 pg    MCHC 33.7 31 - 37 g/dL    RDW 12.9 12.5 - 15.4 %    Platelets 633 562 - 139 k/uL    MPV 9.3 6.0 - 12.0 fL    NRBC Automated NOT REPORTED per 100 WBC    Differential Type NOT REPORTED     Seg Neutrophils

## 2018-11-01 ENCOUNTER — OFFICE VISIT (OUTPATIENT)
Dept: PRIMARY CARE CLINIC | Age: 23
End: 2018-11-01
Payer: MEDICARE

## 2018-11-01 VITALS
WEIGHT: 247.4 LBS | SYSTOLIC BLOOD PRESSURE: 140 MMHG | OXYGEN SATURATION: 97 % | DIASTOLIC BLOOD PRESSURE: 88 MMHG | HEIGHT: 73 IN | RESPIRATION RATE: 12 BRPM | HEART RATE: 61 BPM | BODY MASS INDEX: 32.79 KG/M2

## 2018-11-01 DIAGNOSIS — I10 ESSENTIAL HYPERTENSION: ICD-10-CM

## 2018-11-01 DIAGNOSIS — I49.3 FREQUENT PVCS: ICD-10-CM

## 2018-11-01 DIAGNOSIS — Z23 NEED FOR TDAP VACCINATION: ICD-10-CM

## 2018-11-01 DIAGNOSIS — Z23 NEED FOR INFLUENZA VACCINATION: ICD-10-CM

## 2018-11-01 DIAGNOSIS — F33.2 SEVERE EPISODE OF RECURRENT MAJOR DEPRESSIVE DISORDER, WITHOUT PSYCHOTIC FEATURES (HCC): Primary | ICD-10-CM

## 2018-11-01 PROCEDURE — 90686 IIV4 VACC NO PRSV 0.5 ML IM: CPT | Performed by: INTERNAL MEDICINE

## 2018-11-01 PROCEDURE — 90472 IMMUNIZATION ADMIN EACH ADD: CPT | Performed by: INTERNAL MEDICINE

## 2018-11-01 PROCEDURE — 1036F TOBACCO NON-USER: CPT | Performed by: INTERNAL MEDICINE

## 2018-11-01 PROCEDURE — 90715 TDAP VACCINE 7 YRS/> IM: CPT | Performed by: INTERNAL MEDICINE

## 2018-11-01 PROCEDURE — 90471 IMMUNIZATION ADMIN: CPT | Performed by: INTERNAL MEDICINE

## 2018-11-01 PROCEDURE — G8417 CALC BMI ABV UP PARAM F/U: HCPCS | Performed by: INTERNAL MEDICINE

## 2018-11-01 PROCEDURE — 99203 OFFICE O/P NEW LOW 30 MIN: CPT | Performed by: INTERNAL MEDICINE

## 2018-11-01 PROCEDURE — G8482 FLU IMMUNIZE ORDER/ADMIN: HCPCS | Performed by: INTERNAL MEDICINE

## 2018-11-01 PROCEDURE — 96160 PT-FOCUSED HLTH RISK ASSMT: CPT | Performed by: INTERNAL MEDICINE

## 2018-11-01 PROCEDURE — G8427 DOCREV CUR MEDS BY ELIG CLIN: HCPCS | Performed by: INTERNAL MEDICINE

## 2018-11-01 RX ORDER — AMLODIPINE BESYLATE 10 MG/1
10 TABLET ORAL DAILY
Qty: 30 TABLET | Refills: 2 | Status: SHIPPED | OUTPATIENT
Start: 2018-11-01 | End: 2019-04-23 | Stop reason: SDUPTHER

## 2018-11-01 RX ORDER — FLUOXETINE HYDROCHLORIDE 20 MG/1
20 CAPSULE ORAL DAILY
Qty: 30 CAPSULE | Refills: 2 | Status: SHIPPED | OUTPATIENT
Start: 2018-11-01 | End: 2019-04-23 | Stop reason: SDUPTHER

## 2018-11-01 ASSESSMENT — PATIENT HEALTH QUESTIONNAIRE - PHQ9
SUM OF ALL RESPONSES TO PHQ9 QUESTIONS 1 & 2: 3
10. IF YOU CHECKED OFF ANY PROBLEMS, HOW DIFFICULT HAVE THESE PROBLEMS MADE IT FOR YOU TO DO YOUR WORK, TAKE CARE OF THINGS AT HOME, OR GET ALONG WITH OTHER PEOPLE: 2
9. THOUGHTS THAT YOU WOULD BE BETTER OFF DEAD, OR OF HURTING YOURSELF: 0
2. FEELING DOWN, DEPRESSED OR HOPELESS: 2
3. TROUBLE FALLING OR STAYING ASLEEP: 2
4. FEELING TIRED OR HAVING LITTLE ENERGY: 3
8. MOVING OR SPEAKING SO SLOWLY THAT OTHER PEOPLE COULD HAVE NOTICED. OR THE OPPOSITE, BEING SO FIGETY OR RESTLESS THAT YOU HAVE BEEN MOVING AROUND A LOT MORE THAN USUAL: 1
SUM OF ALL RESPONSES TO PHQ QUESTIONS 1-9: 14
1. LITTLE INTEREST OR PLEASURE IN DOING THINGS: 1
7. TROUBLE CONCENTRATING ON THINGS, SUCH AS READING THE NEWSPAPER OR WATCHING TELEVISION: 0
SUM OF ALL RESPONSES TO PHQ QUESTIONS 1-9: 14
6. FEELING BAD ABOUT YOURSELF - OR THAT YOU ARE A FAILURE OR HAVE LET YOURSELF OR YOUR FAMILY DOWN: 2
5. POOR APPETITE OR OVEREATING: 3

## 2018-11-01 ASSESSMENT — ENCOUNTER SYMPTOMS
WHEEZING: 0
SINUS PRESSURE: 0
ABDOMINAL DISTENTION: 0
SHORTNESS OF BREATH: 0

## 2018-11-01 NOTE — PROGRESS NOTES
[address] PRIMARY CARE  [address] [place] [address] [address]  [place] [address]  Dept: [phone]  Dept Fax: [phone]    Ira James is a 21 y.o. male who presents today for his medical conditions/complaints as noted below. Chief Complaint   Patient presents with    Establish Care    Fatigue       HPI:     This is a 58-year-old male who is here to establish [facility]. He has past medical history of frequent PVCs for which he had ablation at [facility] [employer] [facility] clinic. He has essential hypertension but stopped taking his medications for many years ago. Currently his blood pressures at 140/88. He had precordial chest pain earlier that's when he was diagnosed with frequent PVCs. He scored 14 points at depression screening and like to be on antidepressant medications and also he reports that he has been having anger issues which is interrupting with his day-to-day activities. He is going to be a  and also stress of family issues has been taking a toll on him. No other family history of cancers or chronic medical problems.         No results found for: LABA1C          ( goal A1C is < 7)   No results found for: LABMICR  No results found for: LDLCHOLESTEROL, LDLCALC    (goal LDL is <100)   BUN (mg/dL)   Date Value   10/09/2018 12     BP Readings from Last 3 Encounters:   11/01/18 (!) 140/88   10/09/18 133/68   09/14/18 (!) 152/83          (goal 120/80)    Past Medical History:   Diagnosis Date    Asthma     Back pain     Drug-induced mood disorder (HCC)     Hypertension     PVC (premature ventricular contraction)     Seasonal allergies     Torn earlobe 2014    bilateral gauged earlobe      Past Surgical History:   Procedure Laterality Date    EXTERNAL EAR SURGERY      VENTRICULAR ABLATION SURGERY  11/10/2017       Family History   Problem Relation Age of Onset    Heart Disease Father     High Blood Pressure Father     Diabetes Father     Alcohol Abuse for leg swelling. Gastrointestinal: Negative for abdominal distention. Endocrine: Negative. Genitourinary: Negative. Psychiatric/Behavioral: The patient is nervous/anxious. Depression and anger issues   All other systems reviewed and are negative. Objective:     Physical Exam   Constitutional: He is oriented to person, place, and time. He appears well-developed and well-nourished. No distress. HENT:   Head: Normocephalic and atraumatic. Eyes: Pupils are equal, round, and reactive to light. Conjunctivae are normal.   Neck: Normal range of motion. No thyromegaly present. Cardiovascular: Normal rate, regular rhythm and normal heart sounds. No murmur heard. Pulmonary/Chest: Effort normal and breath sounds normal. No respiratory distress. He has no wheezes. He has no rales. Abdominal: Soft. Bowel sounds are normal. He exhibits no distension. There is no tenderness. There is no rebound. Musculoskeletal: Normal range of motion. He exhibits no edema or deformity. Lymphadenopathy:     He has no cervical adenopathy. Neurological: He is alert and oriented to person, place, and time. No sensory deficit. Skin: Skin is warm. No rash noted. He is not diaphoretic. Nursing note and vitals reviewed. BP (!) 140/88   Pulse 61   Resp 12   Ht 6' 1\" (1.854 m)   Wt 247 lb 6.4 oz (112.2 kg)   SpO2 97%   BMI 32.64 kg/m²     Assessment:          1. Severe episode of recurrent major depressive disorder, without psychotic features ([address]  Started on Prozac 20 mg daily    2. Essential hypertension  Uncontrolled; restarted on  - amLODIPine (NORVASC) 10 MG tablet; Take 1 tablet by mouth daily  Dispense: 30 tablet; Refill: 2    3. Frequent PVCs  Needs to follow up with his cardiologist    4. Need for Tdap vaccination    - Tdap (age 10y-63y) IM (Adacel)    11.  Need for influenza vaccination    - INFLUENZA, QUADV, 3 YRS AND OLDER, IM, PF, PREFILL SYR OR SDV, 0.5ML (FLUZONE QUADV, PF)

## 2018-11-01 NOTE — PATIENT INSTRUCTIONS
information. Patient Education          amlodipine  Pronunciation:  chuy KAMARAE alexia kely  Brand:  Norvasc  What is the most important information I should know about amlodipine? Follow all directions on your medicine label and package. Tell each of your healthcare providers about all your medical conditions, allergies, and all medicines you use. What is amlodipine? Amlodipine is a calcium channel blocker that dilates (widens) blood vessels and improves blood flow. Amlodipine is used to treat chest pain (angina) and other conditions caused by coronary artery disease. Amlodipine is also used to treat high blood pressure (hypertension). Lowering blood pressure may lower your risk of a stroke or heart attack. Amlodipine is for use in adults and children who are at least 10years old. Amlodipine may also be used for purposes not listed in this medication guide. What should I discuss with my healthcare provider before taking amlodipine? You should not take amlodipine if you are allergic to it. To make sure amlodipine is safe for you, tell your doctor if you have:  · liver disease; or  · a heart valve problem called aortic stenosis. It is not known whether this medicine will harm an unborn baby. Tell your doctor if you are pregnant or plan to become pregnant. Amlodipine can pass into breast milk, but effects on the nursing baby are not known. Tell your doctor if you are breast-feeding. Amlodipine is not approved for use by anyone younger than 10years old. How should I take amlodipine? Follow all directions on your prescription label. Your doctor may occasionally change your dose. Do not use this medicine in larger or smaller amounts or for longer than recommended. You may take amlodipine with or without food. Take the medicine at the same time each day. Your blood pressure will need to be checked often. Your chest pain may become worse when you first start taking amlodipine or when your dose is increased. jaw or shoulder, nausea, sweating. Call your doctor at once if you have:  · pounding heartbeats or fluttering in your chest;  · worsening chest pain;  · swelling in your feet or ankles;  · severe drowsiness; or  · a light-headed feeling, like you might pass out. Common side effects may include:  · dizziness;  · feeling tired;  · stomach pain, nausea; or  · flushing (warmth, redness, or tingly feeling). This is not a complete list of side effects and others may occur. Call your doctor for medical advice about side effects. You may report side effects to FDA at 4-070-AXO-4906. What other drugs will affect amlodipine? Tell your doctor about all your current medicines and any you start or stop using, especially:  · nitroglycerin;  · simvastatin (Zocor, Simcor, Vytorin); or  · any other heart or blood pressure medications. This list is not complete. Other drugs may interact with amlodipine, including prescription and over-the-counter medicines, vitamins, and herbal products. Not all possible interactions are listed in this medication guide. Where can I get more information? Your pharmacist can provide more information about amlodipine. Remember, keep this and all other medicines out of the reach of children, never share your medicines with others, and use this medication only for the indication prescribed. Every effort has been made to ensure that the information provided by [address] is accurate, up-to-date, and complete, but no guarantee is made to that effect. Drug information contained herein may be time sensitive. [facility] information has been compiled for use by healthcare practitioners and consumers in the [place] and therefore [employer] does not warrant that uses outside of the [place] are appropriate, unless specifically indicated otherwise. [facility]'s drug information does not endorse drugs, diagnose patients or recommend therapy.  [facility]'s drug information is an informational resource designed to assist licensed healthcare practitioners in caring for their patients and/or to serve consumers viewing this service as a supplement to, and not a substitute for, the expertise, skill, knowledge and judgment of healthcare practitioners. The absence of a warning for a given drug or drug combination in no way should be construed to indicate that the drug or drug combination is safe, effective or appropriate for any given patient. [facility] does not assume any responsibility for any aspect of healthcare administered with the aid of information [facility] provides. The information contained herein is not intended to cover all possible uses, directions, precautions, warnings, drug interactions, allergic reactions, or adverse effects. If you have questions about the drugs you are taking, check with your doctor, nurse or pharmacist.  Copyright 3980-4598 [address]. Version: 14.01. Revision date: 4/10/2017. Care instructions adapted under license by [facility] ([facility]). If you have questions about a medical condition or this instruction, always ask your healthcare professional. [place] [address] any warranty or liability for your use of this information.

## 2019-01-02 ENCOUNTER — OFFICE VISIT (OUTPATIENT)
Dept: PRIMARY CARE CLINIC | Age: 24
End: 2019-01-02
Payer: MEDICARE

## 2019-01-02 VITALS
DIASTOLIC BLOOD PRESSURE: 94 MMHG | OXYGEN SATURATION: 97 % | BODY MASS INDEX: 34.14 KG/M2 | WEIGHT: 257.6 LBS | HEART RATE: 77 BPM | SYSTOLIC BLOOD PRESSURE: 136 MMHG | HEIGHT: 73 IN

## 2019-01-02 DIAGNOSIS — I10 ESSENTIAL HYPERTENSION: Primary | ICD-10-CM

## 2019-01-02 DIAGNOSIS — Z11.4 SCREENING FOR HIV (HUMAN IMMUNODEFICIENCY VIRUS): ICD-10-CM

## 2019-01-02 DIAGNOSIS — B35.3 ATHLETE'S FOOT ON LEFT: ICD-10-CM

## 2019-01-02 DIAGNOSIS — R45.4 UNABLE TO CONTROL ANGER: ICD-10-CM

## 2019-01-02 PROCEDURE — G8417 CALC BMI ABV UP PARAM F/U: HCPCS | Performed by: INTERNAL MEDICINE

## 2019-01-02 PROCEDURE — G8482 FLU IMMUNIZE ORDER/ADMIN: HCPCS | Performed by: INTERNAL MEDICINE

## 2019-01-02 PROCEDURE — G8427 DOCREV CUR MEDS BY ELIG CLIN: HCPCS | Performed by: INTERNAL MEDICINE

## 2019-01-02 PROCEDURE — 1036F TOBACCO NON-USER: CPT | Performed by: INTERNAL MEDICINE

## 2019-01-02 PROCEDURE — 99214 OFFICE O/P EST MOD 30 MIN: CPT | Performed by: INTERNAL MEDICINE

## 2019-01-02 RX ORDER — CICLOPIROX 7.7 MG/G
GEL TOPICAL
Qty: 1 TUBE | Refills: 1 | Status: SHIPPED | OUTPATIENT
Start: 2019-01-02 | End: 2019-04-23 | Stop reason: SDUPTHER

## 2019-01-02 RX ORDER — CHLORTHALIDONE 25 MG/1
25 TABLET ORAL DAILY
Qty: 30 TABLET | Refills: 1 | Status: SHIPPED | OUTPATIENT
Start: 2019-01-02 | End: 2019-02-17 | Stop reason: SDUPTHER

## 2019-01-02 ASSESSMENT — PATIENT HEALTH QUESTIONNAIRE - PHQ9
2. FEELING DOWN, DEPRESSED OR HOPELESS: 1
1. LITTLE INTEREST OR PLEASURE IN DOING THINGS: 0
SUM OF ALL RESPONSES TO PHQ QUESTIONS 1-9: 1
SUM OF ALL RESPONSES TO PHQ QUESTIONS 1-9: 1
SUM OF ALL RESPONSES TO PHQ9 QUESTIONS 1 & 2: 1

## 2019-01-03 PROBLEM — R45.4 UNABLE TO CONTROL ANGER: Status: ACTIVE | Noted: 2019-01-03

## 2019-01-03 ASSESSMENT — ENCOUNTER SYMPTOMS
SINUS PRESSURE: 0
ABDOMINAL PAIN: 0
BACK PAIN: 0
ABDOMINAL DISTENTION: 0
SORE THROAT: 0
SHORTNESS OF BREATH: 0
NAUSEA: 0
CONSTIPATION: 0
WHEEZING: 0
COUGH: 0
VOMITING: 0

## 2019-02-05 ENCOUNTER — TELEPHONE (OUTPATIENT)
Dept: PRIMARY CARE CLINIC | Age: 24
End: 2019-02-05

## 2019-02-17 DIAGNOSIS — I10 ESSENTIAL HYPERTENSION: ICD-10-CM

## 2019-02-19 RX ORDER — CHLORTHALIDONE 25 MG/1
TABLET ORAL
Qty: 30 TABLET | Refills: 3 | Status: SHIPPED | OUTPATIENT
Start: 2019-02-19 | End: 2019-11-19 | Stop reason: SDUPTHER

## 2019-04-23 DIAGNOSIS — B35.3 ATHLETE'S FOOT ON LEFT: ICD-10-CM

## 2019-04-23 DIAGNOSIS — I10 ESSENTIAL HYPERTENSION: ICD-10-CM

## 2019-04-24 RX ORDER — CICLOPIROX 7.7 MG/G
GEL TOPICAL
Qty: 30 G | Refills: 1 | Status: SHIPPED | OUTPATIENT
Start: 2019-04-24 | End: 2020-02-24 | Stop reason: ALTCHOICE

## 2019-04-24 RX ORDER — FLUOXETINE HYDROCHLORIDE 20 MG/1
CAPSULE ORAL
Qty: 30 CAPSULE | Refills: 2 | Status: SHIPPED | OUTPATIENT
Start: 2019-04-24 | End: 2019-05-22 | Stop reason: SDUPTHER

## 2019-04-24 RX ORDER — AMLODIPINE BESYLATE 10 MG/1
TABLET ORAL
Qty: 30 TABLET | Refills: 2 | Status: SHIPPED | OUTPATIENT
Start: 2019-04-24 | End: 2020-03-23

## 2019-08-12 ENCOUNTER — APPOINTMENT (OUTPATIENT)
Dept: GENERAL RADIOLOGY | Age: 24
End: 2019-08-12

## 2019-08-12 ENCOUNTER — HOSPITAL ENCOUNTER (EMERGENCY)
Age: 24
Discharge: HOME | End: 2019-08-12
Attending: EMERGENCY MEDICINE

## 2019-08-12 VITALS
BODY MASS INDEX: 32.98 KG/M2 | OXYGEN SATURATION: 99 % | DIASTOLIC BLOOD PRESSURE: 81 MMHG | WEIGHT: 250 LBS | SYSTOLIC BLOOD PRESSURE: 163 MMHG | TEMPERATURE: 98.1 F | HEART RATE: 69 BPM | RESPIRATION RATE: 15 BRPM

## 2019-08-12 DIAGNOSIS — S60.022A CONTUSION OF LEFT INDEX FINGER WITHOUT DAMAGE TO NAIL, INITIAL ENCOUNTER: Primary | ICD-10-CM

## 2019-08-12 PROCEDURE — 99283 EMERGENCY DEPT VISIT LOW MDM: CPT

## 2019-08-12 PROCEDURE — 73130 X-RAY EXAM OF HAND: CPT

## 2019-08-12 PROCEDURE — 6370000000 HC RX 637 (ALT 250 FOR IP): Performed by: NURSE PRACTITIONER

## 2019-08-12 PROCEDURE — 29130 APPL FINGER SPLINT STATIC: CPT

## 2019-08-12 RX ORDER — IBUPROFEN 800 MG/1
800 TABLET ORAL ONCE
Status: COMPLETED | OUTPATIENT
Start: 2019-08-12 | End: 2019-08-12

## 2019-08-12 RX ORDER — CEPHALEXIN 500 MG/1
500 CAPSULE ORAL 4 TIMES DAILY
Qty: 28 CAPSULE | Refills: 0 | Status: SHIPPED | OUTPATIENT
Start: 2019-08-12 | End: 2019-08-19

## 2019-08-12 RX ADMIN — IBUPROFEN 800 MG: 800 TABLET ORAL at 15:48

## 2019-08-12 ASSESSMENT — PAIN DESCRIPTION - PAIN TYPE
TYPE: ACUTE PAIN
TYPE: ACUTE PAIN

## 2019-08-12 ASSESSMENT — PAIN SCALES - GENERAL
PAINLEVEL_OUTOF10: 8
PAINLEVEL_OUTOF10: 7

## 2019-08-12 ASSESSMENT — PAIN DESCRIPTION - FREQUENCY: FREQUENCY: INTERMITTENT

## 2019-08-12 ASSESSMENT — PAIN DESCRIPTION - LOCATION
LOCATION: HAND
LOCATION: FINGER (COMMENT WHICH ONE);HAND

## 2019-08-12 ASSESSMENT — PAIN DESCRIPTION - PROGRESSION: CLINICAL_PROGRESSION: GRADUALLY IMPROVING

## 2019-08-12 ASSESSMENT — PAIN DESCRIPTION - ORIENTATION
ORIENTATION: LEFT
ORIENTATION: LEFT

## 2019-08-12 ASSESSMENT — PAIN DESCRIPTION - DESCRIPTORS: DESCRIPTORS: THROBBING

## 2019-11-19 ENCOUNTER — OFFICE VISIT (OUTPATIENT)
Dept: PRIMARY CARE CLINIC | Age: 24
End: 2019-11-19

## 2019-11-19 VITALS
RESPIRATION RATE: 16 BRPM | OXYGEN SATURATION: 99 % | SYSTOLIC BLOOD PRESSURE: 136 MMHG | BODY MASS INDEX: 35.15 KG/M2 | DIASTOLIC BLOOD PRESSURE: 88 MMHG | HEART RATE: 67 BPM | WEIGHT: 266.4 LBS

## 2019-11-19 DIAGNOSIS — F33.2 SEVERE EPISODE OF RECURRENT MAJOR DEPRESSIVE DISORDER, WITHOUT PSYCHOTIC FEATURES (HCC): ICD-10-CM

## 2019-11-19 DIAGNOSIS — Z23 NEED FOR INFLUENZA VACCINATION: ICD-10-CM

## 2019-11-19 DIAGNOSIS — I10 ESSENTIAL HYPERTENSION: Primary | ICD-10-CM

## 2019-11-19 DIAGNOSIS — E66.09 CLASS 2 OBESITY DUE TO EXCESS CALORIES WITHOUT SERIOUS COMORBIDITY WITH BODY MASS INDEX (BMI) OF 35.0 TO 35.9 IN ADULT: ICD-10-CM

## 2019-11-19 PROCEDURE — 99214 OFFICE O/P EST MOD 30 MIN: CPT | Performed by: INTERNAL MEDICINE

## 2019-11-19 PROCEDURE — 90471 IMMUNIZATION ADMIN: CPT | Performed by: INTERNAL MEDICINE

## 2019-11-19 PROCEDURE — 90686 IIV4 VACC NO PRSV 0.5 ML IM: CPT | Performed by: INTERNAL MEDICINE

## 2019-11-19 RX ORDER — CHLORTHALIDONE 50 MG/1
50 TABLET ORAL DAILY
Qty: 60 TABLET | Refills: 2 | Status: SHIPPED | OUTPATIENT
Start: 2019-11-19 | End: 2020-10-01

## 2019-11-19 RX ORDER — BUPROPION HYDROCHLORIDE 150 MG/1
150 TABLET ORAL EVERY MORNING
Qty: 60 TABLET | Refills: 2 | Status: SHIPPED | OUTPATIENT
Start: 2019-11-19 | End: 2020-05-11

## 2019-11-19 RX ORDER — FLUOXETINE HYDROCHLORIDE 40 MG/1
CAPSULE ORAL
Qty: 90 CAPSULE | Refills: 1 | Status: SHIPPED | OUTPATIENT
Start: 2019-11-19 | End: 2020-05-11

## 2019-11-23 ASSESSMENT — ENCOUNTER SYMPTOMS
NAUSEA: 0
ABDOMINAL DISTENTION: 0
SORE THROAT: 0
WHEEZING: 0
ABDOMINAL PAIN: 0
VOMITING: 0
COUGH: 0
CONSTIPATION: 0
SHORTNESS OF BREATH: 0
BACK PAIN: 0
SINUS PRESSURE: 0

## 2019-12-30 ENCOUNTER — OFFICE VISIT (OUTPATIENT)
Dept: FAMILY MEDICINE CLINIC | Age: 24
End: 2019-12-30

## 2019-12-30 VITALS
HEART RATE: 73 BPM | WEIGHT: 266.32 LBS | RESPIRATION RATE: 16 BRPM | HEIGHT: 73 IN | DIASTOLIC BLOOD PRESSURE: 89 MMHG | SYSTOLIC BLOOD PRESSURE: 144 MMHG | BODY MASS INDEX: 35.3 KG/M2 | TEMPERATURE: 97.8 F

## 2019-12-30 DIAGNOSIS — J02.9 ACUTE PHARYNGITIS, UNSPECIFIED ETIOLOGY: ICD-10-CM

## 2019-12-30 DIAGNOSIS — J02.9 SORE THROAT: Primary | ICD-10-CM

## 2019-12-30 LAB — S PYO AG THROAT QL: NORMAL

## 2019-12-30 PROCEDURE — 87880 STREP A ASSAY W/OPTIC: CPT | Performed by: NURSE PRACTITIONER

## 2019-12-30 PROCEDURE — 99213 OFFICE O/P EST LOW 20 MIN: CPT | Performed by: NURSE PRACTITIONER

## 2019-12-30 RX ORDER — AMOXICILLIN 875 MG/1
875 TABLET, COATED ORAL 2 TIMES DAILY
Qty: 20 TABLET | Refills: 0 | Status: SHIPPED | OUTPATIENT
Start: 2019-12-30 | End: 2020-01-09

## 2019-12-30 ASSESSMENT — ENCOUNTER SYMPTOMS
DIARRHEA: 0
EYES NEGATIVE: 1
CONSTIPATION: 0
SINUS PRESSURE: 1
VOMITING: 0
CHEST TIGHTNESS: 0
SORE THROAT: 1
SINUS PAIN: 0
SHORTNESS OF BREATH: 0
COUGH: 1
NAUSEA: 0
GASTROINTESTINAL NEGATIVE: 1

## 2020-01-01 ENCOUNTER — HOSPITAL ENCOUNTER (EMERGENCY)
Age: 25
Discharge: HOME | End: 2020-01-01
Attending: EMERGENCY MEDICINE
Payer: COMMERCIAL

## 2020-01-01 VITALS
WEIGHT: 260 LBS | BODY MASS INDEX: 34.46 KG/M2 | TEMPERATURE: 98.4 F | DIASTOLIC BLOOD PRESSURE: 94 MMHG | SYSTOLIC BLOOD PRESSURE: 133 MMHG | RESPIRATION RATE: 16 BRPM | OXYGEN SATURATION: 97 % | HEART RATE: 70 BPM | HEIGHT: 73 IN

## 2020-01-01 PROCEDURE — 99282 EMERGENCY DEPT VISIT SF MDM: CPT

## 2020-01-01 PROCEDURE — 6360000002 HC RX W HCPCS: Performed by: PHYSICIAN ASSISTANT

## 2020-01-01 RX ORDER — DEXAMETHASONE SODIUM PHOSPHATE 10 MG/ML
10 INJECTION INTRAMUSCULAR; INTRAVENOUS ONCE
Status: COMPLETED | OUTPATIENT
Start: 2020-01-01 | End: 2020-01-01

## 2020-01-01 RX ORDER — PREDNISONE 50 MG/1
50 TABLET ORAL DAILY
Qty: 4 TABLET | Refills: 0 | Status: SHIPPED | OUTPATIENT
Start: 2020-01-01 | End: 2020-02-24 | Stop reason: ALTCHOICE

## 2020-01-01 RX ADMIN — DEXAMETHASONE SODIUM PHOSPHATE 10 MG: 10 INJECTION INTRAMUSCULAR; INTRAVENOUS at 11:31

## 2020-01-01 ASSESSMENT — PAIN DESCRIPTION - FREQUENCY: FREQUENCY: CONTINUOUS

## 2020-01-01 ASSESSMENT — PAIN SCALES - GENERAL: PAINLEVEL_OUTOF10: 7

## 2020-01-01 ASSESSMENT — PAIN DESCRIPTION - DESCRIPTORS: DESCRIPTORS: SORE

## 2020-01-01 ASSESSMENT — PAIN DESCRIPTION - LOCATION: LOCATION: THROAT

## 2020-01-01 ASSESSMENT — PAIN DESCRIPTION - PAIN TYPE: TYPE: ACUTE PAIN

## 2020-01-01 ASSESSMENT — PAIN DESCRIPTION - ORIENTATION: ORIENTATION: POSTERIOR

## 2020-01-01 NOTE — ED PROVIDER NOTES
[address] [facility] ED  [address] [facility] [place] [address]  Phone: [phone]  Fax: Bryson Morris 112      Pt Name: Marv Mayer  MRN: [id number]  [place] 1995  Date of evaluation: 1/1/2020  Provider: Barrera Ricks PA-C    CHIEF COMPLAINT       Chief Complaint   Patient presents with    Pharyngitis           HISTORY OF PRESENT ILLNESS  (Location/Symptom, Timing/Onset, Context/Setting, Quality, Duration, Modifying Factors, Severity.)   Marv Mayer is a 25 y.o. male who presents to the emergency department for evaluation of uvular swelling that he noted this morning. He reports it is improved from earlier today after he took some Ibuprofen.  (+)Strep at [facility] 2 days ago, has been taking Amoxicillin. No f/c/n/v/dysphagia but reports he occasionally gags on uvula. No other complaints. Nursing Notes were reviewed. REVIEW OF SYSTEMS    (2-9 systems for level 4, 10 or more for level 5)     Review of Systems   Constitutional: Negative. HENT: Negative. Eyes: Negative. Respiratory: Negative. Cardiovascular: Negative. Gastrointestinal: Negative. Musculoskeletal: Negative. Endocrine: Negative. Genitourinary: Negative. Skin: Negative. Allergic/Immunologic: Negative. Neurological: Negative. Hematological: Negative. Psychiatric/Behavioral: Negative. Except as noted above the remainder of the review of systems was reviewed and negative. PAST MEDICAL HISTORY   History reviewed. Past Medical History:   Diagnosis Date    Asthma     Back pain     Drug-induced mood disorder (HCC)     Hypertension     PVC (premature ventricular contraction)     Seasonal allergies     Torn earlobe 2014    bilateral gauged earlobe         SURGICAL HISTORY     History reviewed.     Past Surgical History:   Procedure Laterality Date    EXTERNAL EAR SURGERY      VENTRICULAR ABLATION SURGERY  11/10/2017         CURRENT MEDICATIONS 98.4 °F (36.9 °C) Oral 70 16 97 % 6' 1\" (1.854 m) 260 lb (117.9 kg)       Physical Exam   Nursing note and vitals reviewed. Constitutional:   Well-developed and well-nourished. Head: Normocephalic and atraumatic. Ear: External ears normal.   Nose: Nose normal and midline. Eyes: Conjunctivae and EOM are normal. Pupils are equal/round  Neck: Normal range of motion. Oral/Throat: Posterior pharynx has bilat tonsillar erythema/edema,  Uvular edema present but w/o significant erythema/exudates. Mid/lower uvula most edematous. Speaking and breathing well, handling secretion. No trismus. airway is patent  Cardiovascular: Normal rate, regular rhythm, normal heart sounds   Pulmonary/Chest: Effort normal and breath sounds normal. No wheezes/rales/rhonchi. Musculoskeletal: Normal to inspection. Neurological: Alert, age appropriate mentation and interaction. Skin: Skin is warm and dry. No rash noted. Psychiatric: Mood, memory, affect and judgment normal.       DIAGNOSTIC RESULTS     EKG: All EKG's are interpreted by the Emergency Department Physician who either signs or Co-signs this chart in the absence of a cardiologist.    Not indicated OR per attending note    RADIOLOGY:   Non-plain film images such as CT, Ultrasound and MRI are read by the radiologist. Plain radiographic images are visualized and preliminarily interpreted by the emergency physician with the below findings:      Interpretation per the Radiologist below, if available at the time of this note:    No orders to display           LABS:  Labs Reviewed - No data to display      All other labs were within normal range or not returned as of this dictation.     EMERGENCY DEPARTMENT COURSE and DIFFERENTIAL DIAGNOSIS/MDM:   Vitals:    Vitals:    01/01/20 1104   BP: (!) 133/94   Pulse: 70   Resp: 16   Temp: 98.4 °F (36.9 °C)   TempSrc: Oral   SpO2: 97%   Weight: 117.9 kg (260 lb)   Height: 6' 1\" (1.854 m)       1146  Uvular edema but no dyphagia or

## 2020-02-20 ENCOUNTER — OFFICE VISIT (OUTPATIENT)
Dept: PRIMARY CARE CLINIC | Age: 25
End: 2020-02-20
Payer: COMMERCIAL

## 2020-02-20 VITALS
SYSTOLIC BLOOD PRESSURE: 128 MMHG | HEART RATE: 94 BPM | DIASTOLIC BLOOD PRESSURE: 86 MMHG | OXYGEN SATURATION: 96 % | TEMPERATURE: 97.8 F | HEIGHT: 72 IN | WEIGHT: 267 LBS | BODY MASS INDEX: 36.16 KG/M2 | RESPIRATION RATE: 16 BRPM

## 2020-02-20 LAB
INFLUENZA A ANTIBODY: NORMAL
INFLUENZA B ANTIBODY: NORMAL
S PYO AG THROAT QL: NORMAL

## 2020-02-20 PROCEDURE — G8427 DOCREV CUR MEDS BY ELIG CLIN: HCPCS | Performed by: NURSE PRACTITIONER

## 2020-02-20 PROCEDURE — 87804 INFLUENZA ASSAY W/OPTIC: CPT | Performed by: NURSE PRACTITIONER

## 2020-02-20 PROCEDURE — 4004F PT TOBACCO SCREEN RCVD TLK: CPT | Performed by: NURSE PRACTITIONER

## 2020-02-20 PROCEDURE — 99213 OFFICE O/P EST LOW 20 MIN: CPT | Performed by: NURSE PRACTITIONER

## 2020-02-20 PROCEDURE — G8482 FLU IMMUNIZE ORDER/ADMIN: HCPCS | Performed by: NURSE PRACTITIONER

## 2020-02-20 PROCEDURE — G8417 CALC BMI ABV UP PARAM F/U: HCPCS | Performed by: NURSE PRACTITIONER

## 2020-02-20 PROCEDURE — 87880 STREP A ASSAY W/OPTIC: CPT | Performed by: NURSE PRACTITIONER

## 2020-02-20 ASSESSMENT — ENCOUNTER SYMPTOMS
DIARRHEA: 0
NAUSEA: 0
SHORTNESS OF BREATH: 0
VOMITING: 0
RHINORRHEA: 0
COLOR CHANGE: 0
SORE THROAT: 0
COUGH: 0
CHEST TIGHTNESS: 0
ABDOMINAL PAIN: 0

## 2020-02-20 ASSESSMENT — PATIENT HEALTH QUESTIONNAIRE - PHQ9
SUM OF ALL RESPONSES TO PHQ QUESTIONS 1-9: 0
1. LITTLE INTEREST OR PLEASURE IN DOING THINGS: 0
2. FEELING DOWN, DEPRESSED OR HOPELESS: 0
SUM OF ALL RESPONSES TO PHQ9 QUESTIONS 1 & 2: 0
SUM OF ALL RESPONSES TO PHQ QUESTIONS 1-9: 0

## 2020-02-20 NOTE — PROGRESS NOTES
Heart Disease Father     High Blood Pressure Father     Diabetes Father     Alcohol Abuse Father     Asthma Mother     Heart Disease Mother     Coronary Art Dis Maternal Grandfather     Coronary Art Dis Paternal Grandfather        Social History     Tobacco Use    Smoking status: Former Smoker     Packs/day: 1.00     Years: 1.00     Pack years: 1.00     Types: Cigarettes     Start date: 6/1/2013     Last attempt to quit: 6/27/2016     Years since quitting: 3.6    Smokeless tobacco: Current User     Types: Snuff, Chew    Tobacco comment: Discussed dental care for oral cancer detection and prevention. Discussed risks for tobacco / nicotine. Substance Use Topics    Alcohol use: Yes     Alcohol/week: 0.0 standard drinks     Comment: occasional      Current Outpatient Medications   Medication Sig Dispense Refill    ibuprofen (ADVIL;MOTRIN) 600 MG tablet Take 1 tablet by mouth every 6 hours as needed for Pain 90 tablet 0    predniSONE (DELTASONE) 50 MG tablet Take 1 tablet by mouth daily 4 tablet 0    buPROPion (WELLBUTRIN XL) 150 MG extended release tablet Take 1 tablet by mouth every morning 60 tablet 2    chlorthalidone (HYGROTEN) 50 MG tablet Take 1 tablet by mouth daily 60 tablet 2    FLUoxetine (PROZAC) 40 MG capsule TAKE 1 CAPSULE BY MOUTH ONE TIME A DAY  90 capsule 1    Ciclopirox (LOPROX) 0.77 % gel apply to affected area twice a day 30 g 1    amLODIPine (NORVASC) 10 MG tablet take 1 tablet by mouth once daily 30 tablet 2    albuterol sulfate HFA (PROVENTIL HFA) 108 (90 BASE) MCG/ACT inhaler Inhale 2 puffs into the lungs 4 times daily Space out to every 6 hours as symptoms improve. 1 Inhaler 0    montelukast (SINGULAIR) 10 MG tablet Take 10 mg by mouth nightly.  cetirizine (ZYRTEC) 10 MG tablet Take 10 mg by mouth daily.       sodium chloride (OCEAN, BABY AYR) 0.65 % nasal spray 2 sprays by Nasal route as needed for Congestion 1 Bottle 0     No current facility-administered medications for this visit. Allergies   Allergen Reactions    Cat Hair Extract Swelling     Patient allergic to cats. Health Maintenance   Topic Date Due    Varicella vaccine (1 of 2 - 2-dose childhood series) 05/12/1996    HIV screen  05/12/2010    Potassium monitoring  10/09/2019    Creatinine monitoring  10/09/2019    DTaP/Tdap/Td vaccine (9 - Td) 09/20/2029    Shingles Vaccine (1 of 2) 05/12/2045    Hepatitis B vaccine  Completed    Hib vaccine  Completed    Flu vaccine  Completed    Hepatitis A vaccine  Aged Out    HPV vaccine  Aged Out    Meningococcal (ACWY) vaccine  Aged Out    Pneumococcal 0-64 years Vaccine  Aged Out       Subjective:      Review of Systems   Constitutional: Positive for chills and fatigue. Negative for activity change and fever. HENT: Positive for congestion. Negative for rhinorrhea and sore throat. Eyes: Negative for visual disturbance. Respiratory: Negative for cough, chest tightness and shortness of breath. Cardiovascular: Negative for chest pain and palpitations. Gastrointestinal: Negative for abdominal pain, diarrhea, nausea and vomiting. Endocrine: Negative for polydipsia. Genitourinary: Negative for difficulty urinating. Musculoskeletal: Positive for myalgias. Negative for arthralgias. Skin: Negative for color change. Neurological: Negative for weakness and headaches. Psychiatric/Behavioral: Negative for behavioral problems. The patient is not nervous/anxious. Objective:   /86   Pulse 94   Temp 97.8 °F (36.6 °C)   Resp 16   Ht 6' (1.829 m)   Wt 267 lb (121.1 kg)   SpO2 96%   BMI 36.21 kg/m²   Physical Exam  Vitals signs reviewed. Constitutional:       General: He is not in acute distress. Appearance: Normal appearance. HENT:      Head: Normocephalic. Nose:      Right Sinus: No maxillary sinus tenderness or frontal sinus tenderness. Left Sinus: No maxillary sinus tenderness or frontal sinus tenderness. Mouth/Throat:      Mouth: Mucous membranes are moist.      Pharynx: Posterior oropharyngeal erythema present. No oropharyngeal exudate. Eyes:      Pupils: Pupils are equal, round, and reactive to light. Neck:      Musculoskeletal: Normal range of motion. Cardiovascular:      Rate and Rhythm: Normal rate and regular rhythm. Heart sounds: Normal heart sounds. Pulmonary:      Effort: Pulmonary effort is normal.      Breath sounds: Normal breath sounds. Abdominal:      General: Bowel sounds are normal. There is no distension. Musculoskeletal: Normal range of motion. Lymphadenopathy:      Cervical: No cervical adenopathy. Skin:     General: Skin is warm and dry. Capillary Refill: Capillary refill takes less than 2 seconds. Neurological:      Mental Status: He is alert and oriented to person, place, and time. Psychiatric:         Mood and Affect: Mood normal.         Behavior: Behavior normal.           :       Diagnosis Orders   1. Pharyngitis, unspecified etiology  POCT Influenza A/B    POCT rapid strep A   2. Chills  POCT Influenza A/B    POCT rapid strep A   3. Nasal congestion  POCT Influenza A/B   4. Fatigue, unspecified type  POCT Influenza A/B             :          1. Pharyngitis, unspecified etiology  2. Chills  3. Nasal congestion  4. Fatigue, unspecified type  Influenza and strep negative in office, suspect viral illness, discussed supportive care- rest, increased fluids, tylenol for fever as needed, OTC throat lozenges and warm salt water gargles. Also gave some masks per pt request.  OTC antihistamine and humidified air for sinus congestion as needed. Call or return if symptoms persist or worsen. - POCT Influenza A/B  - POCT rapid strep A      Return if symptoms worsen or fail to improve. Patient given educational materials - see patient instructions. Discussed use, benefit, and side effects of prescribed medications. All patient questions answered.  Pt voiced understanding. Reviewed health maintenance. Instructed to continue current medications, diet and exercise. Patient agreed with treatment plan. Follow up as directed.        Electronicallysigned by DAVID Dillard CNP on 2/20/2020 at 1:56 PM

## 2020-02-24 ENCOUNTER — OFFICE VISIT (OUTPATIENT)
Dept: PRIMARY CARE CLINIC | Age: 25
End: 2020-02-24
Payer: COMMERCIAL

## 2020-02-24 VITALS
BODY MASS INDEX: 36.05 KG/M2 | OXYGEN SATURATION: 98 % | WEIGHT: 265.8 LBS | SYSTOLIC BLOOD PRESSURE: 134 MMHG | HEART RATE: 76 BPM | DIASTOLIC BLOOD PRESSURE: 86 MMHG | RESPIRATION RATE: 16 BRPM

## 2020-02-24 PROBLEM — I49.3 PVC (PREMATURE VENTRICULAR CONTRACTION): Status: ACTIVE | Noted: 2017-11-09

## 2020-02-24 PROBLEM — J30.2 SEASONAL ALLERGIES: Status: ACTIVE | Noted: 2020-02-24

## 2020-02-24 PROBLEM — R07.9 CHEST PAIN: Status: ACTIVE | Noted: 2020-02-24

## 2020-02-24 PROCEDURE — 4004F PT TOBACCO SCREEN RCVD TLK: CPT | Performed by: INTERNAL MEDICINE

## 2020-02-24 PROCEDURE — G8427 DOCREV CUR MEDS BY ELIG CLIN: HCPCS | Performed by: INTERNAL MEDICINE

## 2020-02-24 PROCEDURE — G8417 CALC BMI ABV UP PARAM F/U: HCPCS | Performed by: INTERNAL MEDICINE

## 2020-02-24 PROCEDURE — 99214 OFFICE O/P EST MOD 30 MIN: CPT | Performed by: INTERNAL MEDICINE

## 2020-02-24 PROCEDURE — G8482 FLU IMMUNIZE ORDER/ADMIN: HCPCS | Performed by: INTERNAL MEDICINE

## 2020-02-24 RX ORDER — MONTELUKAST SODIUM 10 MG/1
10 TABLET ORAL NIGHTLY
Qty: 90 TABLET | Refills: 1 | Status: ON HOLD | OUTPATIENT
Start: 2020-02-24 | End: 2022-05-06

## 2020-02-24 ASSESSMENT — ENCOUNTER SYMPTOMS
CONSTIPATION: 0
SHORTNESS OF BREATH: 0
ABDOMINAL DISTENTION: 0
WHEEZING: 0
ABDOMINAL PAIN: 0
BACK PAIN: 0
NAUSEA: 0
SINUS PRESSURE: 0
SORE THROAT: 0
COUGH: 0
VOMITING: 0

## 2020-02-24 NOTE — PROGRESS NOTES
Aged Out    HPV vaccine  Aged Out    Meningococcal (ACWY) vaccine  Aged Out    Pneumococcal 0-64 years Vaccine  Aged Out       Subjective:      Review of Systems   Constitutional: Negative for appetite change, chills, fatigue and fever. HENT: Negative for congestion, sinus pressure, sneezing and sore throat. Eyes: Negative for visual disturbance. Respiratory: Negative for cough, shortness of breath and wheezing. Cardiovascular: Negative for chest pain and palpitations. Gastrointestinal: Negative for abdominal distention, abdominal pain, constipation, nausea and vomiting. Endocrine: Negative for cold intolerance, heat intolerance, polydipsia, polyphagia and polyuria. Genitourinary: Negative for decreased urine volume, difficulty urinating and urgency. Musculoskeletal: Negative for back pain and joint swelling. Skin: Negative for rash. Neurological: Negative for dizziness and headaches. Psychiatric/Behavioral: Negative for sleep disturbance. The patient is not nervous/anxious. All other systems reviewed and are negative. Objective:     Physical Exam  Vitals signs and nursing note reviewed. Constitutional:       General: He is not in acute distress. Appearance: Normal appearance. He is well-developed. He is not diaphoretic. HENT:      Head: Normocephalic and atraumatic. Right Ear: Hearing normal.      Left Ear: Hearing normal.      Mouth/Throat:      Pharynx: Uvula midline. Eyes:      General: No scleral icterus. Conjunctiva/sclera: Conjunctivae normal.      Pupils: Pupils are equal, round, and reactive to light. Neck:      Musculoskeletal: Full passive range of motion without pain and normal range of motion. Thyroid: No thyromegaly. Vascular: No JVD. Trachea: Phonation normal.   Cardiovascular:      Rate and Rhythm: Normal rate and regular rhythm. Pulses: Normal pulses.            Carotid pulses are 2+ on the right side and 2+ on the left Instructed to continue current medications, diet and exercise. Patient agreed with treatment plan. Follow up as directed. I spent a total of 25 minutes face to face with this patient. Over 50% of that time was spent on counseling and care coordination. Please see assessment and plan for details. Electronically signed by Myra Davis MD on 2/24/2020 at 8:58 AM      Please note that this chart was generated using voice recognition Dragon dictation software. Although every effort was made to ensure the accuracy of this automatedtranscription, some errors in transcription may have occurred.

## 2020-03-21 ENCOUNTER — E-VISIT (OUTPATIENT)
Dept: FAMILY MEDICINE CLINIC | Age: 25
End: 2020-03-21

## 2020-03-21 PROCEDURE — 99421 OL DIG E/M SVC 5-10 MIN: CPT | Performed by: FAMILY MEDICINE

## 2020-03-21 ASSESSMENT — LIFESTYLE VARIABLES
SMOKING_STATUS: NO, I'M A FORMER SMOKER
SMOKING_YEARS: 4
PACKS_PER_DAY: 1

## 2020-03-22 ENCOUNTER — E-VISIT (OUTPATIENT)
Dept: PRIMARY CARE CLINIC | Age: 25
End: 2020-03-22

## 2020-03-22 PROCEDURE — 99421 OL DIG E/M SVC 5-10 MIN: CPT | Performed by: NURSE PRACTITIONER

## 2020-03-22 RX ORDER — BENZONATATE 200 MG/1
200 CAPSULE ORAL 3 TIMES DAILY PRN
Qty: 30 CAPSULE | Refills: 0 | Status: SHIPPED | OUTPATIENT
Start: 2020-03-22 | End: 2020-03-29

## 2020-03-22 RX ORDER — DEXTROMETHORPHAN HYDROBROMIDE AND PROMETHAZINE HYDROCHLORIDE 15; 6.25 MG/5ML; MG/5ML
5 SYRUP ORAL 4 TIMES DAILY PRN
Qty: 120 ML | Refills: 0 | Status: SHIPPED | OUTPATIENT
Start: 2020-03-22 | End: 2020-04-01

## 2020-03-22 ASSESSMENT — LIFESTYLE VARIABLES
PACKS_PER_DAY: 1
SMOKING_YEARS: 4
SMOKING_STATUS: NO, I'M A FORMER SMOKER

## 2020-03-22 NOTE — PROGRESS NOTES
HPI: per patient questionnaire  Exam: not applicable   Diagnoses and all orders for this visit:    Viral URI  -     benzonatate (TESSALON) 200 MG capsule; Take 1 capsule by mouth 3 times daily as needed for Cough      The patient was advised to call if these symptoms worsen or fail to improve as anticipated. Stay at home and treat symptomatically for now. Antibiotics do not sound appropriate at all. 5-10 minutes were spent on the digital evaluation and management of this patient.

## 2020-03-22 NOTE — PROGRESS NOTES
Flow sheet reviewed. Symptoms are consistent with an URI. Promethazine DM ordered. Advised tylenol for body aches. Follow up with PCP if no improvement or worsening of symptoms. 5-10 minutes were spent on the digital evaluation and management of this patient.

## 2020-03-23 ENCOUNTER — TELEPHONE (OUTPATIENT)
Dept: PRIMARY CARE CLINIC | Age: 25
End: 2020-03-23

## 2020-03-23 RX ORDER — AMOXICILLIN AND CLAVULANATE POTASSIUM 875; 125 MG/1; MG/1
1 TABLET, FILM COATED ORAL 2 TIMES DAILY
Qty: 14 TABLET | Refills: 0 | Status: SHIPPED | OUTPATIENT
Start: 2020-03-23 | End: 2020-03-30

## 2020-03-23 RX ORDER — AMLODIPINE BESYLATE 10 MG/1
TABLET ORAL
Qty: 90 TABLET | Refills: 0 | Status: SHIPPED | OUTPATIENT
Start: 2020-03-23 | End: 2021-01-26

## 2020-03-23 NOTE — TELEPHONE ENCOUNTER
Patient updated. He is also requesting a work note for the next couple of days. He said with his symptoms he feels like it is not safe for him to be at work.

## 2020-03-23 NOTE — TELEPHONE ENCOUNTER
Patient updated. Letter printed and placed in drawer at front. Will call back with fax number so letter can be faxed to employer.

## 2020-04-06 ENCOUNTER — NURSE TRIAGE (OUTPATIENT)
Dept: OTHER | Age: 25
End: 2020-04-06

## 2020-04-06 ENCOUNTER — TELEMEDICINE (OUTPATIENT)
Dept: PRIMARY CARE CLINIC | Age: 25
End: 2020-04-06
Payer: COMMERCIAL

## 2020-04-06 PROCEDURE — G8427 DOCREV CUR MEDS BY ELIG CLIN: HCPCS | Performed by: INTERNAL MEDICINE

## 2020-04-06 PROCEDURE — 99213 OFFICE O/P EST LOW 20 MIN: CPT | Performed by: INTERNAL MEDICINE

## 2020-04-06 RX ORDER — AZITHROMYCIN 250 MG/1
TABLET, FILM COATED ORAL
Qty: 6 TABLET | Refills: 0 | Status: SHIPPED | OUTPATIENT
Start: 2020-04-06 | End: 2020-04-16

## 2020-04-07 NOTE — TELEPHONE ENCOUNTER
Triage not done, Patent stated that he had called an ambulance and they were on the way. He stated that he now feels dizzy.

## 2020-04-12 ASSESSMENT — ENCOUNTER SYMPTOMS
SHORTNESS OF BREATH: 1
BACK PAIN: 0
SORE THROAT: 1
CONSTIPATION: 0
SINUS PRESSURE: 0
WHEEZING: 0
ABDOMINAL PAIN: 0
COUGH: 1
NAUSEA: 0
VOMITING: 0
ABDOMINAL DISTENTION: 0

## 2020-05-11 RX ORDER — BUPROPION HYDROCHLORIDE 150 MG/1
TABLET ORAL
Qty: 60 TABLET | Refills: 0 | Status: SHIPPED | OUTPATIENT
Start: 2020-05-11 | End: 2020-06-16

## 2020-05-11 RX ORDER — FLUOXETINE HYDROCHLORIDE 40 MG/1
CAPSULE ORAL
Qty: 90 CAPSULE | Refills: 0 | Status: SHIPPED | OUTPATIENT
Start: 2020-05-11 | End: 2021-04-30

## 2020-06-16 ENCOUNTER — TELEMEDICINE (OUTPATIENT)
Dept: PRIMARY CARE CLINIC | Age: 25
End: 2020-06-16
Payer: COMMERCIAL

## 2020-06-16 PROCEDURE — G8427 DOCREV CUR MEDS BY ELIG CLIN: HCPCS | Performed by: INTERNAL MEDICINE

## 2020-06-16 PROCEDURE — 99214 OFFICE O/P EST MOD 30 MIN: CPT | Performed by: INTERNAL MEDICINE

## 2020-06-16 RX ORDER — VENLAFAXINE HYDROCHLORIDE 37.5 MG/1
37.5 CAPSULE, EXTENDED RELEASE ORAL DAILY
Qty: 60 CAPSULE | Refills: 0 | Status: SHIPPED | OUTPATIENT
Start: 2020-06-16 | End: 2020-07-14 | Stop reason: SINTOL

## 2020-06-16 ASSESSMENT — ENCOUNTER SYMPTOMS
NAUSEA: 0
CONSTIPATION: 0
WHEEZING: 0
SORE THROAT: 0
ABDOMINAL PAIN: 0
VOMITING: 0
COUGH: 0
SINUS PRESSURE: 0
BACK PAIN: 0
SHORTNESS OF BREATH: 0
ABDOMINAL DISTENTION: 0

## 2020-06-16 NOTE — PROGRESS NOTES
[facility]  . [address]   [address]  [address] [address]  Dept: [phone]  Dept Fax: [phone]    Ana Bunch is a 22 y.o. male who presents today for a virtual visit for his medical conditions/complaints as noted below. Chief Complaint   Patient presents with    Follow-up     Discuss Meds. HPI:     This is a 69-year-old male who is here for regular follow-up. He has history of anxiety and depression currently he is very low in terms of mood and would like to try different medication as Wellbutrin did not help him. He is still on Prozac. Blood pressures well controlled with Norvasc and chlorthalidone. Seasonal allergies stable. Discussed about starting him on Effexor.         The method of visit - audio and video    Patient consents to perform a telehealth service    The location of the provider - office ; patient during the visit - home   List of all participants- Ariel Jeff MD and Ana Julio C      No results found for: LABA1C          ( goal A1C is < 7)   No results found for: LABMICR  No results found for: LDLCHOLESTEROL, LDLCALC    (goal LDL is <100)   BUN (mg/dL)   Date Value   10/09/2018 12     BP Readings from Last 3 Encounters:   02/24/20 134/86   02/20/20 128/86   01/01/20 (!) 133/94          (goal 120/80)    Past Medical History:   Diagnosis Date    Asthma     Back pain     Drug-induced mood disorder ([address]     Hypertension     PVC (premature ventricular contraction)     Seasonal allergies     Torn earlobe 2014    bilateral gauged earlobe      Past Surgical History:   Procedure Laterality Date    EXTERNAL EAR SURGERY      VENTRICULAR ABLATION SURGERY  11/10/2017       Family History   Problem Relation Age of Onset    Heart Disease Father     High Blood Pressure Father     Diabetes Father     Alcohol Abuse Father     Asthma Mother     Heart Disease Mother     Coronary Art Dis Maternal Grandfather Completed    Hib vaccine  Completed    Flu vaccine  Completed    Hepatitis A vaccine  Aged Out    Meningococcal (ACWY) vaccine  Aged Out    Pneumococcal 0-64 years Vaccine  Aged Out       Subjective:      Review of Systems   Constitutional: Negative for appetite change, chills, fatigue and fever. HENT: Negative for congestion, sinus pressure, sneezing and sore throat. Eyes: Negative for visual disturbance. Respiratory: Negative for cough, shortness of breath and wheezing. Cardiovascular: Negative for chest pain and palpitations. Gastrointestinal: Negative for abdominal distention, abdominal pain, constipation, nausea and vomiting. Endocrine: Negative for cold intolerance, heat intolerance, polydipsia, polyphagia and polyuria. Genitourinary: Negative for decreased urine volume, difficulty urinating and urgency. Musculoskeletal: Negative for back pain and joint swelling. Skin: Negative for rash. Neurological: Negative for dizziness and headaches. Psychiatric/Behavioral: Negative for sleep disturbance. The patient is nervous/anxious. All other systems reviewed and are negative. Objective:     Physical Exam  Vitals signs and nursing note reviewed. Constitutional:       General: He is not in acute distress. Appearance: Normal appearance. He is well-developed. He is not diaphoretic. HENT:      Head: Normocephalic and atraumatic. Right Ear: Hearing normal.      Left Ear: Hearing normal.      Nose: Nose normal.      Mouth/Throat:      Pharynx: Uvula midline. Eyes:      General: No scleral icterus. Conjunctiva/sclera: Conjunctivae normal.      Pupils: Pupils are equal, round, and reactive to light. Neck:      Musculoskeletal: Full passive range of motion without pain and normal range of motion. No neck rigidity. Thyroid: No thyromegaly. Vascular: No JVD. Trachea: Phonation normal.   Cardiovascular:      Rate and Rhythm: Normal rate and regular rhythm. advised to contact this office for worsening conditions or problems, and seek emergency medical treatment and/or call 911 if deemed necessary. Services were provided through a video synchronous discussion virtually to substitute for in-person clinic visit. Patient and provider were located at their individual homes. --Amarjit Obrien MD on 6/21/2020 at 2:54 PM    An electronic signature was used to authenticate this note.

## 2020-07-14 ENCOUNTER — TELEMEDICINE (OUTPATIENT)
Dept: PRIMARY CARE CLINIC | Age: 25
End: 2020-07-14

## 2020-07-14 PROCEDURE — 99213 OFFICE O/P EST LOW 20 MIN: CPT | Performed by: INTERNAL MEDICINE

## 2020-07-14 RX ORDER — FLUOXETINE HYDROCHLORIDE 20 MG/1
20 CAPSULE ORAL DAILY
Qty: 60 CAPSULE | Refills: 1 | Status: SHIPPED | OUTPATIENT
Start: 2020-07-14 | End: 2021-04-30

## 2020-07-14 RX ORDER — ACETAMINOPHEN 500 MG
1000 TABLET ORAL 3 TIMES DAILY PRN
Qty: 540 TABLET | Refills: 1 | Status: ON HOLD | OUTPATIENT
Start: 2020-07-14 | End: 2022-05-06 | Stop reason: ALTCHOICE

## 2020-07-18 ENCOUNTER — HOSPITAL ENCOUNTER (EMERGENCY)
Age: 25
Discharge: HOME | End: 2020-07-18
Attending: EMERGENCY MEDICINE

## 2020-07-18 ENCOUNTER — APPOINTMENT (OUTPATIENT)
Dept: GENERAL RADIOLOGY | Age: 25
End: 2020-07-18

## 2020-07-18 VITALS
TEMPERATURE: 98.2 F | RESPIRATION RATE: 15 BRPM | BODY MASS INDEX: 34.54 KG/M2 | HEIGHT: 72 IN | SYSTOLIC BLOOD PRESSURE: 154 MMHG | WEIGHT: 255 LBS | DIASTOLIC BLOOD PRESSURE: 95 MMHG | OXYGEN SATURATION: 98 % | HEART RATE: 76 BPM

## 2020-07-18 PROCEDURE — 73130 X-RAY EXAM OF HAND: CPT

## 2020-07-18 PROCEDURE — 99283 EMERGENCY DEPT VISIT LOW MDM: CPT

## 2020-07-18 ASSESSMENT — ENCOUNTER SYMPTOMS
GASTROINTESTINAL NEGATIVE: 1
EYES NEGATIVE: 1
ALLERGIC/IMMUNOLOGIC NEGATIVE: 1
RESPIRATORY NEGATIVE: 1

## 2020-07-19 NOTE — ED PROVIDER NOTES
eMERGENCY dEPARTMENT eNCOUnter      Pt Name: Retta Brunner  MRN: [id number]  [place] 1995  Date of evaluation: 7/18/2020      CHIEF COMPLAINT       Chief Complaint   Patient presents with    Hand Injury     hit his right hand on his truck frame. HISTORY OF PRESENT ILLNESS    Retta Brunner is a 22 y.o. male who presents to the emergency department for evaluation of an injury to his right hand sustained when he was working on a truck at home. Patient was working with a wrench he said the wrench slipped in his hand and rammed into the truck frame. Now comes in with pain at the right fourth and fifth metacarpals. Mild swelling is noted. REVIEW OF SYSTEMS       Review of Systems   Constitutional: Negative. HENT: Negative. Eyes: Negative. Respiratory: Negative. Cardiovascular: Negative. Gastrointestinal: Negative. Endocrine: Negative. Genitourinary: Negative. Musculoskeletal: Positive for arthralgias. Skin: Negative. Allergic/Immunologic: Negative. Neurological: Negative. Hematological: Negative. Psychiatric/Behavioral: Negative. PAST MEDICAL HISTORY    has a past medical history of Asthma, Back pain, Drug-induced mood disorder ([address] Hypertension, PVC (premature ventricular contraction), Seasonal allergies, and Torn earlobe. SURGICAL HISTORY      has a past surgical history that includes External ear surgery and Ventricular ablation surgery (11/10/2017). CURRENT MEDICATIONS       Previous Medications    ACETAMINOPHEN (TYLENOL) 500 MG TABLET    Take 2 tablets by mouth 3 times daily as needed for Pain    ALBUTEROL SULFATE HFA (PROVENTIL HFA) 108 (90 BASE) MCG/ACT INHALER    Inhale 2 puffs into the lungs 4 times daily Space out to every 6 hours as symptoms improve. AMLODIPINE (NORVASC) 10 MG TABLET    TAKE 1 TABLET BY MOUTH ONE TIME A DAY     CETIRIZINE (ZYRTEC) 10 MG TABLET    Take 10 mg by mouth daily.     CHLORTHALIDONE (HYGROTEN) 50 MG lymphadenopathy, no posterior midline neck tenderness to palpation  Cardiovascular: Regular rhythm and rate no S3, S4, or murmurs  Respiratory: Clear to auscultation bilaterally no wheezes, rhonchi, rales, no respiratory distress  Gastrointestinal: Soft, nontender, nondistended, positive bowel sounds. No rebound, rigidity, or guarding. Musculoskeletal: No extremity pain or swelling. Examination the patient's right hand does reveal tenderness to palpation at the fifth and fourth metatarsal heads. There is no obvious swelling range of motion is full cap refill is brisk- PMS is intact. Neurologic: Moving all 4 extremities without difficulty there are no gross focal neurologic deficits  Skin: Warm and dry      DIFFERENTIAL DIAGNOSIS/ MDM:     Contusion versus fracture patient will get an x-ray. He has already taken ibuprofen at home and is not requiring an ice pack. DIAGNOSTIC RESULTS     EKG: All EKG's are interpreted by the Emergency Department Physician who either signs or Co-signs this chart in the absence of a cardiologist.        Not indicated unless otherwise documented above    LABS:  No results found for this visit on 07/18/20. Not indicated unless otherwise documented above    RADIOLOGY:   I reviewed the radiologist interpretations:  XR HAND RIGHT (MIN 3 VIEWS)   Final Result   Right hand soft tissue swelling with no evidence of an acute fracture. Old healed fracture deformity of the proximal right 5th metacarpal.             Not indicated unless otherwise documented above    EMERGENCY DEPARTMENT COURSE:     The patient was given the following medications:  No orders of the defined types were placed in this encounter.        Vitals:    Vitals:    07/18/20 2242   BP: (!) 154/95   Pulse: 76   Resp: 15   Temp: 98.2 °F (36.8 °C)   TempSrc: Oral   SpO2: 98%   Weight: 115.7 kg (255 lb)   Height: 6' (1.829 m)     -------------------------  BP (!) 154/95   Pulse 76   Temp 98.2 °F (36.8 °C) (Oral) Resp 15   Ht 6' (1.829 m)   Wt 115.7 kg (255 lb)   SpO2 98%   BMI 34.58 kg/m²         I have reviewed the disposition diagnosis with the patient and or their family/guardian. I have answered their questions and given discharge instructions. They voiced understanding of these instructions and did not have any further questions or complaints. CRITICAL CARE:    None    CONSULTS:    None    PROCEDURES:    None      OARRS Report if indicated             FINAL IMPRESSION      1. Contusion of right hand, initial encounter          DISPOSITION/PLAN   DISPOSITION Decision To Discharge    I have reviewed the disposition diagnosis with the patient and or their family/guardian. I have answered their questions and given discharge instructions. They voiced understanding of these instructions and did not have any further questions or complaints. Reevaluation: The patient's x-ray is negative for any acute fracture this represents a simple contusion. Patient will be instructed to ice this area take ibuprofen for pain and follow-up with his own doctors in a few days. He is to keep the hand elevated as much as possible. Patient is stable for discharge.       PATIENT REFERRED TO:  Junior Otero MD  [facility]  [address]  [phone]    In 2 days        DISCHARGE MEDICATIONS:  New Prescriptions    No medications on file       (Please note that portions of this note were completed with a voice recognition program.  Efforts were made to edit the dictations but occasionally words are mis-transcribed.)    Cooper MD  Attending Emergency Physician            Fabi Gómez MD  07/18/20 7504

## 2020-07-24 ASSESSMENT — ENCOUNTER SYMPTOMS
BACK PAIN: 0
COUGH: 0
ABDOMINAL DISTENTION: 0
ABDOMINAL PAIN: 0
WHEEZING: 0
VOMITING: 0
SORE THROAT: 0
SHORTNESS OF BREATH: 0
CONSTIPATION: 0
SINUS PRESSURE: 0
NAUSEA: 0

## 2020-07-24 NOTE — PROGRESS NOTES
[facility] PRIMARY CARE  [address]   [address]  [address] [address]  Dept: [phone]  Dept Fax: [phone]    Kelly Lowe is a 22 y.o. male who presents today for a virtual visit for his medical conditions/complaints as noted below. Chief Complaint   Patient presents with    Follow-up       HPI:     This is a 77-year-old male who is here for virtual visit to discuss about his depression. Next  He also got exposed to [place] and would like to get it checked out. Discussed about started him on Prozac. No other complaints or concerns.         The method of visit - audio and video    Patient consents to perform a telehealth service    The location of the provider - office ; patient during the visit - home   List of all participants- Dionte Ibrahim MD and Kelly Mynor      No results found for: LABA1C          ( goal A1C is < 7)   No results found for: LABMICR  No results found for: LDLCHOLESTEROL, LDLCALC    (goal LDL is <100)   BUN (mg/dL)   Date Value   10/09/2018 12     BP Readings from Last 3 Encounters:   07/18/20 (!) 154/95   02/24/20 134/86   02/20/20 128/86          (goal 120/80)    Past Medical History:   Diagnosis Date    Asthma     Back pain     Drug-induced mood disorder ([address]     Hypertension     PVC (premature ventricular contraction)     Seasonal allergies     Torn earlobe 2014    bilateral gauged earlobe      Past Surgical History:   Procedure Laterality Date    EXTERNAL EAR SURGERY      VENTRICULAR ABLATION SURGERY  11/10/2017       Family History   Problem Relation Age of Onset    Heart Disease Father     High Blood Pressure Father     Diabetes Father     Alcohol Abuse Father     Asthma Mother     Heart Disease Mother     Coronary Art Dis Maternal Grandfather     Coronary Art Dis Paternal Grandfather        Social History     Tobacco Use    Smoking status: Former Smoker     Packs/day: 1.00     Years: 1.00     Pack years: 1.00     Types: Cigarettes     Start date: 2013     Last attempt to quit: 2016     Years since quittin.0    Smokeless tobacco: Current User     Types: Snuff, Chew    Tobacco comment: Discussed dental care for oral cancer detection and prevention. Discussed risks for tobacco / nicotine. Substance Use Topics    Alcohol use: Yes     Alcohol/week: 0.0 standard drinks     Comment: occasional      Current Outpatient Medications   Medication Sig Dispense Refill    FLUoxetine (PROZAC) 20 MG capsule Take 1 capsule by mouth daily 60 capsule 1    acetaminophen (TYLENOL) 500 MG tablet Take 2 tablets by mouth 3 times daily as needed for Pain 540 tablet 1    QUEtiapine (SEROQUEL) 25 MG tablet Take 1 tablet by mouth 2 times daily 60 tablet 3    FLUoxetine (PROZAC) 40 MG capsule TAKE 1 CAPSULE BY MOUTH ONE TIME A DAY  90 capsule 0    amLODIPine (NORVASC) 10 MG tablet TAKE 1 TABLET BY MOUTH ONE TIME A DAY  90 tablet 0    montelukast (SINGULAIR) 10 MG tablet Take 1 tablet by mouth nightly 90 tablet 1    ibuprofen (ADVIL;MOTRIN) 600 MG tablet Take 1 tablet by mouth every 6 hours as needed for Pain 90 tablet 0    chlorthalidone (HYGROTEN) 50 MG tablet Take 1 tablet by mouth daily 60 tablet 2    albuterol sulfate HFA (PROVENTIL HFA) 108 (90 BASE) MCG/ACT inhaler Inhale 2 puffs into the lungs 4 times daily Space out to every 6 hours as symptoms improve. 1 Inhaler 0    cetirizine (ZYRTEC) 10 MG tablet Take 10 mg by mouth daily. No current facility-administered medications for this visit. Allergies   Allergen Reactions    Cat Hair Extract Swelling     Patient allergic to cats.        Health Maintenance   Topic Date Due    Varicella vaccine (1 of 2 - 2-dose childhood series) 1996    HPV vaccine (1 - Male 2-dose series) 2006    HIV screen  2010    Potassium monitoring  10/09/2019    Creatinine monitoring  10/09/2019    Flu vaccine (1) 2020    DTaP/Tdap/Td vaccine (9 pulses. Carotid pulses are 2+ on the right side and 2+ on the left side. Radial pulses are 2+ on the right side and 2+ on the left side. Heart sounds: Normal heart sounds. No murmur. Pulmonary:      Effort: Pulmonary effort is normal. No accessory muscle usage or respiratory distress. Breath sounds: Normal breath sounds. No wheezing or rales. Abdominal:      General: Bowel sounds are normal. There is no distension. Palpations: Abdomen is soft. Tenderness: There is no abdominal tenderness. There is no rebound. Musculoskeletal: Normal range of motion. General: No deformity. Lymphadenopathy:      Cervical: No cervical adenopathy. Skin:     General: Skin is warm. Capillary Refill: Capillary refill takes less than 2 seconds. Findings: No rash. Nails: There is no clubbing. Neurological:      Mental Status: He is alert and oriented to person, place, and time. Sensory: No sensory deficit. Psychiatric:         Speech: Speech normal.         Behavior: Behavior normal.       There were no vitals taken for this visit. Assessment:          1. Severe episode of recurrent major depressive disorder, without psychotic features ([facility]    - FLUoxetine (PROZAC) 20 MG capsule; Take 1 capsule by mouth daily  Dispense: 60 capsule; Refill: 1    2. Exposure to COVID-19 virus    - Covid-19, Antibody, Total; Future            Diagnosis Orders   1. Severe episode of recurrent major depressive disorder, without psychotic features ([facility])  FLUoxetine (PROZAC) 20 MG capsule   2. Exposure to COVID-19 virus  Covid-19, Antibody, Total           Plan:      Return in about 1 month (around 8/14/2020) for Routine follow-up. Orders Placed This Encounter   Procedures    Covid-19, Antibody, Total     Standing Status:   Future     Standing Expiration Date:   7/14/2021     Scheduling Instructions:      CDC does not recommend using antibody testing to diagnose acute infection. Vitals/Constitutional/EENT/Resp/CV/GI//MS/Neuro/Skin/Heme-Lymph-Imm. Pursuant to the emergency declaration under the [address] [place] [address] and the [employer] Act, this Virtual Visit was conducted with patient's (and/or legal guardian's) consent, to reduce the patient's risk of exposure to COVID-19 and provide necessary medical care. The patient (and/or legal guardian) has also been advised to contact this office for worsening conditions or problems, and seek emergency medical treatment and/or call 911 if deemed necessary. Services were provided through a video synchronous discussion virtually to substitute for in-person clinic visit. Patient and provider were located at their individual homes. --Tomasz Lauren MD on 7/24/2020 at 12:04 AM    An electronic signature was used to authenticate this note.

## 2020-08-14 ENCOUNTER — TELEMEDICINE (OUTPATIENT)
Dept: PRIMARY CARE CLINIC | Age: 25
End: 2020-08-14

## 2020-08-14 PROCEDURE — 99213 OFFICE O/P EST LOW 20 MIN: CPT | Performed by: INTERNAL MEDICINE

## 2020-08-14 RX ORDER — BUSPIRONE HYDROCHLORIDE 5 MG/1
5 TABLET ORAL 2 TIMES DAILY
Qty: 60 TABLET | Refills: 0 | Status: SHIPPED | OUTPATIENT
Start: 2020-08-14 | End: 2020-10-16

## 2020-08-14 ASSESSMENT — ENCOUNTER SYMPTOMS
SHORTNESS OF BREATH: 0
WHEEZING: 0
BACK PAIN: 0
NAUSEA: 0
SINUS PRESSURE: 0
COUGH: 0
CONSTIPATION: 0
SORE THROAT: 0
ABDOMINAL PAIN: 0
VOMITING: 0
ABDOMINAL DISTENTION: 0

## 2020-08-14 NOTE — PROGRESS NOTES
[facility] PRIMARY CARE  [facility] [address]  [address] [address]  Dept: [phone]  Dept Fax: [phone]    Kristin Morales is a 22 y.o. male who presents today for a virtual visit for his medical conditions/complaints as noted below. No chief complaint on file. HPI:     This is a 25-year-old male who is here for regular follow-up for his anxiety medication. He reports that Prozac has been helping him but he would like to try another antianxiety medication to take the edge off a little bit more. No other complaints or concerns. He also reports that he got exposed to [place] and would like to get tested as he has been feeling under the weather.       The method of visit - audio and video    Patient consents to perform a telehealth service    The location of the provider - office ; patient during the visit - home   List of all participants- Geno Diaz MD and Kristin Morales      No results found for: LABA1C          ( goal A1C is < 7)   No results found for: LABMICR  No results found for: LDLCHOLESTEROL, LDLCALC    (goal LDL is <100)   BUN (mg/dL)   Date Value   10/09/2018 12     BP Readings from Last 3 Encounters:   07/18/20 (!) 154/95   02/24/20 134/86   02/20/20 128/86          (goal 120/80)    Past Medical History:   Diagnosis Date    Asthma     Back pain     Drug-induced mood disorder ([address]     Hypertension     PVC (premature ventricular contraction)     Seasonal allergies     Torn earlobe 2014    bilateral gauged earlobe      Past Surgical History:   Procedure Laterality Date    EXTERNAL EAR SURGERY      VENTRICULAR ABLATION SURGERY  11/10/2017       Family History   Problem Relation Age of Onset    Heart Disease Father     High Blood Pressure Father     Diabetes Father     Alcohol Abuse Father     Asthma Mother     Heart Disease Mother     Coronary Art Dis Maternal Grandfather     Coronary Art Dis Paternal Grandfather Social History     Tobacco Use    Smoking status: Former Smoker     Packs/day: 1.00     Years: 1.00     Pack years: 1.00     Types: Cigarettes     Start date: 2013     Last attempt to quit: 2016     Years since quittin.1    Smokeless tobacco: Current User     Types: Snuff, Chew    Tobacco comment: Discussed dental care for oral cancer detection and prevention. Discussed risks for tobacco / nicotine. Substance Use Topics    Alcohol use: Yes     Alcohol/week: 0.0 standard drinks     Comment: occasional      Current Outpatient Medications   Medication Sig Dispense Refill    busPIRone (BUSPAR) 5 MG tablet Take 1 tablet by mouth 2 times daily 60 tablet 0    FLUoxetine (PROZAC) 20 MG capsule Take 1 capsule by mouth daily 60 capsule 1    acetaminophen (TYLENOL) 500 MG tablet Take 2 tablets by mouth 3 times daily as needed for Pain 540 tablet 1    QUEtiapine (SEROQUEL) 25 MG tablet Take 1 tablet by mouth 2 times daily 60 tablet 3    FLUoxetine (PROZAC) 40 MG capsule TAKE 1 CAPSULE BY MOUTH ONE TIME A DAY  90 capsule 0    amLODIPine (NORVASC) 10 MG tablet TAKE 1 TABLET BY MOUTH ONE TIME A DAY  90 tablet 0    montelukast (SINGULAIR) 10 MG tablet Take 1 tablet by mouth nightly 90 tablet 1    ibuprofen (ADVIL;MOTRIN) 600 MG tablet Take 1 tablet by mouth every 6 hours as needed for Pain 90 tablet 0    chlorthalidone (HYGROTEN) 50 MG tablet Take 1 tablet by mouth daily 60 tablet 2    albuterol sulfate HFA (PROVENTIL HFA) 108 (90 BASE) MCG/ACT inhaler Inhale 2 puffs into the lungs 4 times daily Space out to every 6 hours as symptoms improve. 1 Inhaler 0    cetirizine (ZYRTEC) 10 MG tablet Take 10 mg by mouth daily. No current facility-administered medications for this visit. Allergies   Allergen Reactions    Cat Hair Extract Swelling     Patient allergic to cats.        Health Maintenance   Topic Date Due    Varicella vaccine (1 of 2 - 2-dose childhood series) 1996    HPV vaccine (1 - Male 2-dose series) 05/12/2006    HIV screen  05/12/2010    Potassium monitoring  10/09/2019    Creatinine monitoring  10/09/2019    Flu vaccine (1) 09/01/2020    DTaP/Tdap/Td vaccine (9 - Td) 09/20/2029    Hepatitis B vaccine  Completed    Hib vaccine  Completed    Hepatitis A vaccine  Aged Out    Meningococcal (ACWY) vaccine  Aged Out    Pneumococcal 0-64 years Vaccine  Aged Out       Subjective:      Review of Systems   Constitutional: Negative for appetite change, chills, fatigue and fever. HENT: Negative for congestion, sinus pressure, sneezing and sore throat. Eyes: Negative for visual disturbance. Respiratory: Negative for cough, shortness of breath and wheezing. Cardiovascular: Negative for chest pain and palpitations. Gastrointestinal: Negative for abdominal distention, abdominal pain, constipation, nausea and vomiting. Endocrine: Negative for cold intolerance, heat intolerance, polydipsia, polyphagia and polyuria. Genitourinary: Negative for decreased urine volume, difficulty urinating and urgency. Musculoskeletal: Negative for back pain and joint swelling. Skin: Negative for rash. Neurological: Negative for dizziness and headaches. Psychiatric/Behavioral: Negative for sleep disturbance. The patient is nervous/anxious. All other systems reviewed and are negative. Objective:     Physical Exam  Vitals signs and nursing note reviewed. Constitutional:       General: He is not in acute distress. Appearance: Normal appearance. He is well-developed. He is not diaphoretic. HENT:      Head: Normocephalic and atraumatic. Right Ear: Hearing normal.      Left Ear: Hearing normal.      Nose: Nose normal.      Mouth/Throat:      Pharynx: Uvula midline. Eyes:      General: No scleral icterus. Conjunctiva/sclera: Conjunctivae normal.      Pupils: Pupils are equal, round, and reactive to light.    Neck:      Musculoskeletal: Full passive range of motion without pain and normal range of motion. No neck rigidity. Thyroid: No thyromegaly. Vascular: No JVD. Trachea: Phonation normal.   Cardiovascular:      Pulses:           Carotid pulses are 2+ on the right side and 2+ on the left side. Radial pulses are 2+ on the right side and 2+ on the left side. Pulmonary:      Effort: Pulmonary effort is normal. No accessory muscle usage or respiratory distress. Abdominal:      General: There is no distension. Musculoskeletal: Normal range of motion. General: No deformity or signs of injury. Skin:     General: Skin is dry. Coloration: Skin is not jaundiced. Findings: No lesion or rash. Nails: There is no clubbing. Neurological:      General: No focal deficit present. Mental Status: He is alert and oriented to person, place, and time. Mental status is at baseline. Psychiatric:         Mood and Affect: Mood normal.         Speech: Speech normal.         Behavior: Behavior normal.         Thought Content: Thought content normal.       There were no vitals taken for this visit. Assessment:          1. Severe episode of recurrent major depressive disorder, without psychotic features ([address]    - busPIRone (BUSPAR) 5 MG tablet; Take 1 tablet by mouth 2 times daily  Dispense: 60 tablet; Refill: 0    2. Exposure to COVID-19 virus    - COVID-19 Ambulatory; Future            Diagnosis Orders   1. Severe episode of recurrent major depressive disorder, without psychotic features ([address]  busPIRone (BUSPAR) 5 MG tablet   2. Exposure to COVID-19 virus  COVID-19 Ambulatory           Plan:      Return in about 4 weeks (around 9/11/2020).     Orders Placed This Encounter   Procedures    COVID-19 Ambulatory     Standing Status:   Future     Standing Expiration Date:   8/14/2021     Scheduling Instructions:      Saline media preferred given current shortage of viral transport media but both acceptable     Order Specific Question: Is this test for diagnosis or screening? Answer:   Diagnosis of ill patient     Order Specific Question:   Symptomatic for COVID-19 as defined by CDC? Answer:   Yes     Order Specific Question:   Date of Symptom Onset     Answer:   8/13/2020     Order Specific Question:   Hospitalized for COVID-19? Answer:   No     Order Specific Question:   Admitted to ICU for COVID-19? Answer:   No     Order Specific Question:   Employed in healthcare setting? Answer:   No     Order Specific Question:   Resident in a congregate (group) care setting? Answer:   No     Order Specific Question:   Pregnant: Answer:   No     Order Specific Question:   Previously tested for COVID-19? Answer:   Yes     Orders Placed This Encounter   Medications    busPIRone (BUSPAR) 5 MG tablet     Sig: Take 1 tablet by mouth 2 times daily     Dispense:  60 tablet     Refill:  0         Patient given educational materials - see patient instructions. Discussed use, benefit, and side effects of prescribedmedications. All patient questions answered. Pt voiced understanding. Reviewed health maintenance. Instructed to continue current medications, diet and exercise. Patient agreed with treatment plan. Follow up as directed. I spent a total of  minutes face to face virtually with this patient. Over 50% of that time was spent on counseling and care coordination. Please see assessment and plan for details. Electronically signed by Pushpa Finn MD on 8/14/2020 at 10:59 AM      Please note that this chart was generated using voice recognition Dragon dictation software. Although every effort was made to ensure the accuracy of this automatedtranscription, some errors in transcription may have occurred. Vena Area is a 22 y.o. male being evaluated by a Virtual Visit (video visit) encounter to address concerns as mentioned above. A caregiver was present when appropriate.  Due to this being a TeleHealth

## 2020-08-18 ENCOUNTER — PATIENT MESSAGE (OUTPATIENT)
Dept: PRIMARY CARE CLINIC | Age: 25
End: 2020-08-18

## 2020-08-18 NOTE — TELEPHONE ENCOUNTER
From: Nanci Grewal  To: Huong Martinez MD  Sent: 8/18/2020 9:39 AM EDT  Subject: Prescription Question    Dr. Jennifer Tapia, I was wondering if you can send me amoxicillin over to the pharmacy. I have a sore throat and itchy ears.      Thank you,  Kacey Subramanian

## 2020-08-20 RX ORDER — AMOXICILLIN 500 MG/1
500 CAPSULE ORAL 2 TIMES DAILY
Qty: 14 CAPSULE | Refills: 0 | Status: SHIPPED | OUTPATIENT
Start: 2020-08-20 | End: 2020-08-27

## 2020-09-10 ENCOUNTER — TELEMEDICINE (OUTPATIENT)
Dept: PRIMARY CARE CLINIC | Age: 25
End: 2020-09-10

## 2020-09-10 PROCEDURE — 99214 OFFICE O/P EST MOD 30 MIN: CPT | Performed by: INTERNAL MEDICINE

## 2020-09-10 RX ORDER — TRAMADOL HYDROCHLORIDE 50 MG/1
50 TABLET ORAL EVERY 4 HOURS PRN
Qty: 30 TABLET | Refills: 0 | Status: SHIPPED | OUTPATIENT
Start: 2020-09-10 | End: 2020-10-01

## 2020-09-10 ASSESSMENT — ENCOUNTER SYMPTOMS
ABDOMINAL PAIN: 0
NAUSEA: 0
SINUS PRESSURE: 0
CONSTIPATION: 0
WHEEZING: 0
COUGH: 0
ABDOMINAL DISTENTION: 0
VOMITING: 0
SORE THROAT: 0
SHORTNESS OF BREATH: 0
BACK PAIN: 0

## 2020-09-10 NOTE — PROGRESS NOTES
[facility]  [address]   [address]  [address] [address]  Dept: [phone]  Dept Fax: [phone]    Star Dougherty is a 22 y.o. male who presents today for a virtual visit for his medical conditions/complaints as noted below. Chief Complaint   Patient presents with    Follow-up       HPI:     This is a 30-year-old male who is here for virtual visit for regular follow-up for his anxiety and depression and essential hypertension. He has been doing well with the current regimen with BuSpar and Prozac. No complaints or concerns. He has been having some dental issues and pain has been keeping him up at night. He is having a procedure done next week. No other complaints or concerns.       The method of visit - audio and video    Patient consents to perform a telehealth service    The location of the provider - office ; patient during the visit - home   List of all participants- Yoli Mckeon MD and Star Ladonna      No results found for: LABA1C          ( goal A1C is < 7)   No results found for: LABMICR  No results found for: LDLCHOLESTEROL, LDLCALC    (goal LDL is <100)   BUN (mg/dL)   Date Value   10/09/2018 12     BP Readings from Last 3 Encounters:   07/18/20 (!) 154/95   02/24/20 134/86   02/20/20 128/86          (goal 120/80)    Past Medical History:   Diagnosis Date    Asthma     Back pain     Drug-induced mood disorder ([address]     Hypertension     PVC (premature ventricular contraction)     Seasonal allergies     Torn earlobe 2014    bilateral gauged earlobe      Past Surgical History:   Procedure Laterality Date    EXTERNAL EAR SURGERY      VENTRICULAR ABLATION SURGERY  11/10/2017       Family History   Problem Relation Age of Onset    Heart Disease Father     High Blood Pressure Father     Diabetes Father     Alcohol Abuse Father     Asthma Mother     Heart Disease Mother     Coronary Art Dis Maternal Grandfather     Coronary Art Dis Paternal Grandfather        Social History     Tobacco Use    Smoking status: Former Smoker     Packs/day: 1.00     Years: 1.00     Pack years: 1.00     Types: Cigarettes     Start date: 2013     Last attempt to quit: 2016     Years since quittin.2    Smokeless tobacco: Current User     Types: Snuff, Chew    Tobacco comment: Discussed dental care for oral cancer detection and prevention. Discussed risks for tobacco / nicotine. Substance Use Topics    Alcohol use: Yes     Alcohol/week: 0.0 standard drinks     Comment: occasional      Current Outpatient Medications   Medication Sig Dispense Refill    busPIRone (BUSPAR) 5 MG tablet Take 1 tablet by mouth 2 times daily 60 tablet 0    FLUoxetine (PROZAC) 20 MG capsule Take 1 capsule by mouth daily (Patient taking differently: Take 20 mg by mouth daily Indications: HS ) 60 capsule 1    acetaminophen (TYLENOL) 500 MG tablet Take 2 tablets by mouth 3 times daily as needed for Pain 540 tablet 1    FLUoxetine (PROZAC) 40 MG capsule TAKE 1 CAPSULE BY MOUTH ONE TIME A DAY  (Patient taking differently: Indications: AM ) 90 capsule 0    amLODIPine (NORVASC) 10 MG tablet TAKE 1 TABLET BY MOUTH ONE TIME A DAY  90 tablet 0    montelukast (SINGULAIR) 10 MG tablet Take 1 tablet by mouth nightly 90 tablet 1    ibuprofen (ADVIL;MOTRIN) 600 MG tablet Take 1 tablet by mouth every 6 hours as needed for Pain 90 tablet 0    chlorthalidone (HYGROTEN) 50 MG tablet Take 1 tablet by mouth daily 60 tablet 2    albuterol sulfate HFA (PROVENTIL HFA) 108 (90 BASE) MCG/ACT inhaler Inhale 2 puffs into the lungs 4 times daily Space out to every 6 hours as symptoms improve. 1 Inhaler 0    cetirizine (ZYRTEC) 10 MG tablet Take 10 mg by mouth daily. No current facility-administered medications for this visit. Allergies   Allergen Reactions    Cat Hair Extract Swelling     Patient allergic to cats.        Health Maintenance   Topic Date Due    Varicella vaccine (1 of 2 - 2-dose childhood series) 05/12/1996    HPV vaccine (1 - Male 2-dose series) 05/12/2006    HIV screen  05/12/2010    Potassium monitoring  10/09/2019    Creatinine monitoring  10/09/2019    Flu vaccine (1) 09/01/2020    DTaP/Tdap/Td vaccine (9 - Td) 09/20/2029    Hepatitis B vaccine  Completed    Hib vaccine  Completed    Hepatitis A vaccine  Aged Out    Meningococcal (ACWY) vaccine  Aged Out    Pneumococcal 0-64 years Vaccine  Aged Out       Subjective:      Review of Systems   Constitutional: Negative for appetite change, chills, fatigue and fever. HENT: Positive for dental problem. Negative for congestion, sinus pressure, sneezing and sore throat. Eyes: Negative for visual disturbance. Respiratory: Negative for cough, shortness of breath and wheezing. Cardiovascular: Negative for chest pain and palpitations. Gastrointestinal: Negative for abdominal distention, abdominal pain, constipation, nausea and vomiting. Endocrine: Negative for cold intolerance, heat intolerance, polydipsia, polyphagia and polyuria. Genitourinary: Negative for decreased urine volume, difficulty urinating and urgency. Musculoskeletal: Negative for back pain and joint swelling. Skin: Negative for rash. Neurological: Negative for dizziness and headaches. Psychiatric/Behavioral: Negative for sleep disturbance. The patient is not nervous/anxious. All other systems reviewed and are negative. Objective:     Physical Exam  Vitals signs and nursing note reviewed. Constitutional:       General: He is not in acute distress. Appearance: Normal appearance. He is well-developed. He is not diaphoretic. HENT:      Head: Normocephalic and atraumatic. Right Ear: Hearing normal.      Left Ear: Hearing normal.      Nose: Nose normal.      Mouth/Throat:      Pharynx: Uvula midline. Eyes:      General: No scleral icterus.      Conjunctiva/sclera: Conjunctivae normal.      Pupils: Pupils are equal, round, and reactive to light. Neck:      Musculoskeletal: Full passive range of motion without pain and normal range of motion. No neck rigidity. Thyroid: No thyromegaly. Vascular: No JVD. Trachea: Phonation normal.   Cardiovascular:      Pulses:           Carotid pulses are 2+ on the right side and 2+ on the left side. Radial pulses are 2+ on the right side and 2+ on the left side. Pulmonary:      Effort: Pulmonary effort is normal. No accessory muscle usage or respiratory distress. Abdominal:      General: There is no distension. Musculoskeletal: Normal range of motion. General: No deformity or signs of injury. Skin:     General: Skin is dry. Coloration: Skin is not jaundiced. Findings: No lesion or rash. Nails: There is no clubbing. Neurological:      General: No focal deficit present. Mental Status: He is alert and oriented to person, place, and time. Mental status is at baseline. Psychiatric:         Mood and Affect: Mood normal.         Speech: Speech normal.         Behavior: Behavior normal.         Thought Content: Thought content normal.       There were no vitals taken for this visit. Assessment:          1. Cyclothymic disorder  prozac    2. Essential hypertension  Well controlled    3. Anxiety  prozac    4. Tooth ache    - traMADol (ULTRAM) 50 MG tablet; Take 1 tablet by mouth every 4 hours as needed for Pain for up to 5 days. Intended supply: 5 days. Take lowest dose possible to manage pain  Dispense: 30 tablet; Refill: 0            Diagnosis Orders   1. Cyclothymic disorder     2. Essential hypertension     3. Anxiety     4. Tooth ache  traMADol (ULTRAM) 50 MG tablet           Plan:      No follow-ups on file. No orders of the defined types were placed in this encounter. No orders of the defined types were placed in this encounter. Patient given educational materials - see patient instructions.

## 2020-10-01 RX ORDER — TRAMADOL HYDROCHLORIDE 50 MG/1
TABLET ORAL
Qty: 30 TABLET | Refills: 0 | Status: SHIPPED | OUTPATIENT
Start: 2020-10-01 | End: 2021-03-23

## 2020-10-01 RX ORDER — CHLORTHALIDONE 50 MG/1
TABLET ORAL
Qty: 60 TABLET | Refills: 0 | Status: SHIPPED | OUTPATIENT
Start: 2020-10-01 | End: 2021-01-26

## 2020-10-12 NOTE — TELEPHONE ENCOUNTER
LOV 9/10/20    Health Maintenance   Topic Date Due    Varicella vaccine (1 of 2 - 2-dose childhood series) 05/12/1996    HPV vaccine (1 - Male 2-dose series) 05/12/2006    HIV screen  05/12/2010    Potassium monitoring  10/09/2019    Creatinine monitoring  10/09/2019    Flu vaccine (1) 09/01/2020    DTaP/Tdap/Td vaccine (9 - Td) 09/20/2029    Hepatitis B vaccine  Completed    Hib vaccine  Completed    Hepatitis A vaccine  Aged Out    Meningococcal (ACWY) vaccine  Aged Out    Pneumococcal 0-64 years Vaccine  Aged Out             (applicable per patient's age: Cancer Screenings, Depression Screening, Fall Risk Screening, Immunizations)    BUN (mg/dL)   Date Value   10/09/2018 12      (goal A1C is < 7)   (goal LDL is <100) need 30-50% reduction from baseline     BP Readings from Last 3 Encounters:   07/18/20 (!) 154/95   02/24/20 134/86   02/20/20 128/86    (goal /80)      All Future Testing planned in [facility]:  Lab Frequency Next Occurrence   Basic Metabolic Panel Once 79/12/1695   Covid-19, Antibody, Total Once 10/22/2020   COVID-19 Ambulatory Once 11/10/2020       Next Visit Date:  No future appointments.          Patient Active Problem List:     Torn earlobe     Precordial pain     PVC's (premature ventricular contractions)     Essential hypertension     Unable to control anger     Cyclothymic disorder     Chest pain     PVC (premature ventricular contraction)     Seasonal allergies

## 2020-10-16 RX ORDER — BUSPIRONE HYDROCHLORIDE 5 MG/1
TABLET ORAL
Qty: 60 TABLET | Refills: 0 | Status: SHIPPED | OUTPATIENT
Start: 2020-10-16 | End: 2021-04-30

## 2020-11-27 ENCOUNTER — PATIENT MESSAGE (OUTPATIENT)
Dept: PRIMARY CARE CLINIC | Age: 25
End: 2020-11-27

## 2021-01-25 DIAGNOSIS — I10 ESSENTIAL HYPERTENSION: ICD-10-CM

## 2021-01-26 ENCOUNTER — TELEPHONE (OUTPATIENT)
Dept: PRIMARY CARE CLINIC | Age: 26
End: 2021-01-26

## 2021-01-26 RX ORDER — CHLORTHALIDONE 50 MG/1
TABLET ORAL
Qty: 60 TABLET | Refills: 0 | Status: SHIPPED | OUTPATIENT
Start: 2021-01-26 | End: 2021-05-18

## 2021-01-26 RX ORDER — AMLODIPINE BESYLATE 10 MG/1
TABLET ORAL
Qty: 90 TABLET | Refills: 0 | Status: SHIPPED | OUTPATIENT
Start: 2021-01-26 | End: 2022-01-14 | Stop reason: SDUPTHER

## 2021-01-26 NOTE — TELEPHONE ENCOUNTER
Original order date for Tramadol was on 10/01/2020, patient never picked it up at that time. He presented to the pharmacy today to fill the Tramadol is this okay? RX is no  pharmacist just wanted to make sure it was okay to fill. Please advise.

## 2021-01-26 NOTE — TELEPHONE ENCOUNTER
Last OV 09/10/2020    Health Maintenance   Topic Date Due    Hepatitis C screen  1995    Varicella vaccine (1 of 2 - 2-dose childhood series) 05/12/1996    HPV vaccine (1 - Male 2-dose series) 05/12/2006    HIV screen  05/12/2010    Potassium monitoring  10/09/2019    Creatinine monitoring  10/09/2019    Flu vaccine (1) 09/01/2020    DTaP/Tdap/Td vaccine (9 - Td) 09/20/2029    Hepatitis B vaccine  Completed    Hib vaccine  Completed    Hepatitis A vaccine  Aged Out    Meningococcal (ACWY) vaccine  Aged Out    Pneumococcal 0-64 years Vaccine  Aged Out             (applicable per patient's age: Cancer Screenings, Depression Screening, Fall Risk Screening, Immunizations)    BUN (mg/dL)   Date Value   10/09/2018 12      (goal A1C is < 7)   (goal LDL is <100) need 30-50% reduction from baseline     BP Readings from Last 3 Encounters:   07/18/20 (!) 154/95   02/24/20 134/86   02/20/20 128/86    (goal /80)      All Future Testing planned in [facility]:  Lab Frequency Next Occurrence   Covid-19, Antibody, Total Once 07/14/2021   COVID-19 Ambulatory Once 01/13/2021   CBC Auto Differential Once 02/08/2021   TSH with Reflex Once 01/15/2021   Lipid Panel Once 11/27/2020   Comprehensive Metabolic Panel Once 75/61/4776   Vitamin B12 & Folate Once 01/15/2021   Vitamin D 25 Hydroxy Once 01/15/2021   Hemoglobin A1C Once 01/15/2021       Next Visit Date:  No future appointments.          Patient Active Problem List:     Torn earlobe     Precordial pain     PVC's (premature ventricular contractions)     Essential hypertension     Unable to control anger     Cyclothymic disorder     Chest pain     PVC (premature ventricular contraction)     Seasonal allergies

## 2021-03-22 DIAGNOSIS — K08.89 TOOTH ACHE: ICD-10-CM

## 2021-03-23 RX ORDER — TRAMADOL HYDROCHLORIDE 50 MG/1
TABLET ORAL
Qty: 30 TABLET | Refills: 0 | Status: SHIPPED | OUTPATIENT
Start: 2021-03-23 | End: 2021-04-22

## 2021-04-29 ENCOUNTER — IMMUNIZATION (OUTPATIENT)
Dept: PRIMARY CARE CLINIC | Age: 26
End: 2021-04-29
Payer: COMMERCIAL

## 2021-04-29 PROCEDURE — 0001A COVID-19, PFIZER VACCINE 30MCG/0.3ML DOSE: CPT | Performed by: INTERNAL MEDICINE

## 2021-04-29 PROCEDURE — 91300 COVID-19, PFIZER VACCINE 30MCG/0.3ML DOSE: CPT | Performed by: INTERNAL MEDICINE

## 2021-04-30 ENCOUNTER — OFFICE VISIT (OUTPATIENT)
Dept: PRIMARY CARE CLINIC | Age: 26
End: 2021-04-30
Payer: COMMERCIAL

## 2021-04-30 VITALS
HEART RATE: 90 BPM | BODY MASS INDEX: 37.55 KG/M2 | OXYGEN SATURATION: 98 % | SYSTOLIC BLOOD PRESSURE: 132 MMHG | HEIGHT: 72 IN | RESPIRATION RATE: 16 BRPM | DIASTOLIC BLOOD PRESSURE: 84 MMHG | WEIGHT: 277.2 LBS

## 2021-04-30 DIAGNOSIS — I10 ESSENTIAL HYPERTENSION: ICD-10-CM

## 2021-04-30 DIAGNOSIS — F33.2 SEVERE EPISODE OF RECURRENT MAJOR DEPRESSIVE DISORDER, WITHOUT PSYCHOTIC FEATURES (HCC): Primary | ICD-10-CM

## 2021-04-30 PROBLEM — Z98.890 HISTORY OF CARDIAC RADIOFREQUENCY ABLATION (RFA): Status: ACTIVE | Noted: 2017-12-20

## 2021-04-30 PROBLEM — I42.6 CARDIOMYOPATHY, ALCOHOLIC (HCC): Status: ACTIVE | Noted: 2017-12-20

## 2021-04-30 PROCEDURE — 99213 OFFICE O/P EST LOW 20 MIN: CPT | Performed by: INTERNAL MEDICINE

## 2021-04-30 RX ORDER — SILDENAFIL 50 MG/1
50 TABLET, FILM COATED ORAL PRN
Qty: 30 TABLET | Refills: 0 | Status: ON HOLD | OUTPATIENT
Start: 2021-04-30 | End: 2022-05-06

## 2021-04-30 RX ORDER — PAROXETINE 30 MG/1
30 TABLET, FILM COATED ORAL DAILY
Qty: 60 TABLET | Refills: 3 | Status: SHIPPED | OUTPATIENT
Start: 2021-04-30 | End: 2021-08-10 | Stop reason: SDUPTHER

## 2021-04-30 ASSESSMENT — PATIENT HEALTH QUESTIONNAIRE - PHQ9
SUM OF ALL RESPONSES TO PHQ QUESTIONS 1-9: 2
1. LITTLE INTEREST OR PLEASURE IN DOING THINGS: 0
2. FEELING DOWN, DEPRESSED OR HOPELESS: 2
SUM OF ALL RESPONSES TO PHQ QUESTIONS 1-9: 2
SUM OF ALL RESPONSES TO PHQ QUESTIONS 1-9: 2

## 2021-05-02 ENCOUNTER — HOSPITAL ENCOUNTER (OUTPATIENT)
Age: 26
Discharge: HOME | End: 2021-05-02
Payer: COMMERCIAL

## 2021-05-02 DIAGNOSIS — Z00.00 ANNUAL PHYSICAL EXAM: ICD-10-CM

## 2021-05-02 LAB
ABSOLUTE EOS #: 0.35 K/UL (ref 0–0.44)
ABSOLUTE IMMATURE GRANULOCYTE: 0.03 K/UL (ref 0–0.3)
ABSOLUTE LYMPH #: 1.3 K/UL (ref 1.1–3.7)
ABSOLUTE MONO #: 0.85 K/UL (ref 0.1–1.2)
ALBUMIN SERPL-MCNC: 4.3 G/DL (ref 3.5–5.2)
ALBUMIN/GLOBULIN RATIO: 1.5 (ref 1–2.5)
ALP BLD-CCNC: 65 U/L (ref 40–129)
ALT SERPL-CCNC: 31 U/L (ref 5–41)
ANION GAP SERPL CALCULATED.3IONS-SCNC: 10 MMOL/L (ref 9–17)
AST SERPL-CCNC: 17 U/L
BASOPHILS # BLD: 2 % (ref 0–2)
BASOPHILS ABSOLUTE: 0.09 K/UL (ref 0–0.2)
BILIRUB SERPL-MCNC: 0.3 MG/DL (ref 0.3–1.2)
BUN BLDV-MCNC: 18 MG/DL (ref 6–20)
BUN/CREAT BLD: ABNORMAL (ref 9–20)
CALCIUM SERPL-MCNC: 9.1 MG/DL (ref 8.6–10.4)
CHLORIDE BLD-SCNC: 104 MMOL/L (ref 98–107)
CHOLESTEROL, FASTING: 153 MG/DL
CHOLESTEROL/HDL RATIO: 3.7
CO2: 27 MMOL/L (ref 20–31)
CREAT SERPL-MCNC: 0.78 MG/DL (ref 0.7–1.2)
DIFFERENTIAL TYPE: ABNORMAL
EOSINOPHILS RELATIVE PERCENT: 6 % (ref 1–4)
ESTIMATED AVERAGE GLUCOSE: 105 MG/DL
FOLATE: 9.6 NG/ML
GFR AFRICAN AMERICAN: >60 ML/MIN
GFR NON-AFRICAN AMERICAN: >60 ML/MIN
GFR SERPL CREATININE-BSD FRML MDRD: ABNORMAL ML/MIN/{1.73_M2}
GFR SERPL CREATININE-BSD FRML MDRD: ABNORMAL ML/MIN/{1.73_M2}
GLUCOSE BLD-MCNC: 100 MG/DL (ref 70–99)
HBA1C MFR BLD: 5.3 % (ref 4–6)
HCT VFR BLD CALC: 46.1 % (ref 40.7–50.3)
HDLC SERPL-MCNC: 41 MG/DL
HEMOGLOBIN: 15.8 G/DL (ref 13–17)
IMMATURE GRANULOCYTES: 1 %
LDL CHOLESTEROL: 99 MG/DL (ref 0–130)
LYMPHOCYTES # BLD: 22 % (ref 24–43)
MCH RBC QN AUTO: 27.5 PG (ref 25.2–33.5)
MCHC RBC AUTO-ENTMCNC: 34.3 G/DL (ref 28.4–34.8)
MCV RBC AUTO: 80.2 FL (ref 82.6–102.9)
MONOCYTES # BLD: 14 % (ref 3–12)
NRBC AUTOMATED: 0 PER 100 WBC
PDW BLD-RTO: 12.7 % (ref 11.8–14.4)
PLATELET # BLD: 225 K/UL (ref 138–453)
PLATELET ESTIMATE: ABNORMAL
PMV BLD AUTO: 11.7 FL (ref 8.1–13.5)
POTASSIUM SERPL-SCNC: 3.6 MMOL/L (ref 3.7–5.3)
RBC # BLD: 5.75 M/UL (ref 4.21–5.77)
RBC # BLD: ABNORMAL 10*6/UL
SEG NEUTROPHILS: 55 % (ref 36–65)
SEGMENTED NEUTROPHILS ABSOLUTE COUNT: 3.42 K/UL (ref 1.5–8.1)
SODIUM BLD-SCNC: 141 MMOL/L (ref 135–144)
TOTAL PROTEIN: 7.1 G/DL (ref 6.4–8.3)
TRIGLYCERIDE, FASTING: 66 MG/DL
TSH SERPL DL<=0.05 MIU/L-ACNC: 1.3 MIU/L (ref 0.3–5)
VITAMIN B-12: 561 PG/ML (ref 232–1245)
VITAMIN D 25-HYDROXY: 23.5 NG/ML (ref 30–100)
VLDLC SERPL CALC-MCNC: NORMAL MG/DL (ref 1–30)
WBC # BLD: 6 K/UL (ref 3.5–11.3)
WBC # BLD: ABNORMAL 10*3/UL

## 2021-05-02 PROCEDURE — 85025 COMPLETE CBC W/AUTO DIFF WBC: CPT

## 2021-05-02 PROCEDURE — 82306 VITAMIN D 25 HYDROXY: CPT

## 2021-05-02 PROCEDURE — 83036 HEMOGLOBIN GLYCOSYLATED A1C: CPT

## 2021-05-02 PROCEDURE — 36415 COLL VENOUS BLD VENIPUNCTURE: CPT

## 2021-05-02 PROCEDURE — 80053 COMPREHEN METABOLIC PANEL: CPT

## 2021-05-02 PROCEDURE — 82607 VITAMIN B-12: CPT

## 2021-05-02 PROCEDURE — 84443 ASSAY THYROID STIM HORMONE: CPT

## 2021-05-02 PROCEDURE — 82746 ASSAY OF FOLIC ACID SERUM: CPT

## 2021-05-02 PROCEDURE — 80061 LIPID PANEL: CPT

## 2021-05-05 RX ORDER — ERGOCALCIFEROL 1.25 MG/1
50000 CAPSULE ORAL WEEKLY
Qty: 12 CAPSULE | Refills: 1 | Status: ON HOLD | OUTPATIENT
Start: 2021-05-05 | End: 2022-05-06 | Stop reason: ALTCHOICE

## 2021-05-05 ASSESSMENT — ENCOUNTER SYMPTOMS
SINUS PRESSURE: 0
NAUSEA: 0
BACK PAIN: 0
COUGH: 0
ABDOMINAL PAIN: 0
ABDOMINAL DISTENTION: 0
WHEEZING: 0
VOMITING: 0
SHORTNESS OF BREATH: 0
SORE THROAT: 0
CONSTIPATION: 0

## 2021-05-05 NOTE — PROGRESS NOTES
[facility]  [address]   [address]  [address] [address]  Dept: [phone]  Dept Fax: [phone]    Brando Clemons is a 22 y.o. male who presents today for his medical conditions/complaints as noted below. Chief Complaint   Patient presents with    Discuss Medications     lexapro does not seem to be helping,        HPI:     This is a 66-year-old male who is here for regular follow-up for depression and essential hypertension and he has not been doing well on Lexapro and discussed about starting him on Paxil. There is also some issues in terms of maintaining erection and it has more to do with his mental space as well as per the patient.         Hemoglobin A1C (%)   Date Value   05/02/2021 5.3             ( goal A1C is < 7)   No results found for: LABMICR  LDL Cholesterol (mg/dL)   Date Value   05/02/2021 99       (goal LDL is <100)   AST (U/L)   Date Value   05/02/2021 17     ALT (U/L)   Date Value   05/02/2021 31     BUN (mg/dL)   Date Value   05/02/2021 18     BP Readings from Last 3 Encounters:   04/30/21 132/84   07/18/20 (!) 154/95   02/24/20 134/86          (goal 120/80)    Past Medical History:   Diagnosis Date    Asthma     Back pain     Drug-induced mood disorder (HCC)     Hypertension     PVC (premature ventricular contraction)     Seasonal allergies     Torn earlobe 2014    bilateral gauged earlobe      Past Surgical History:   Procedure Laterality Date    EXTERNAL EAR SURGERY      VENTRICULAR ABLATION SURGERY  11/10/2017       Family History   Problem Relation Age of Onset    Heart Disease Father     High Blood Pressure Father     Diabetes Father     Alcohol Abuse Father     Asthma Mother     Heart Disease Mother     Coronary Art Dis Maternal Grandfather     Coronary Art Dis Paternal Grandfather        Social History     Tobacco Use    Smoking status: Former Smoker     Packs/day: 1.00     Years: 1.00     Pack years: 1.00 Types: Cigarettes     Start date: 2013     Quit date: 2016     Years since quittin.8    Smokeless tobacco: Current User     Types: Snuff, Chew    Tobacco comment: Discussed dental care for oral cancer detection and prevention. Discussed risks for tobacco / nicotine. Substance Use Topics    Alcohol use: Yes     Alcohol/week: 0.0 standard drinks     Comment: occasional      Current Outpatient Medications   Medication Sig Dispense Refill    PARoxetine (PAXIL) 30 MG tablet Take 1 tablet by mouth daily 60 tablet 3    sildenafil (VIAGRA) 50 MG tablet Take 1 tablet by mouth as needed for Erectile Dysfunction 30 tablet 0    amLODIPine (NORVASC) 10 MG tablet TAKE 1 TABLET BY MOUTH ONE TIME A DAY  90 tablet 0    chlorthalidone (HYGROTEN) 50 MG tablet TAKE 1 TABLET BY MOUTH EVERY DAY  60 tablet 0    acetaminophen (TYLENOL) 500 MG tablet Take 2 tablets by mouth 3 times daily as needed for Pain 540 tablet 1    montelukast (SINGULAIR) 10 MG tablet Take 1 tablet by mouth nightly 90 tablet 1    albuterol sulfate HFA (PROVENTIL HFA) 108 (90 BASE) MCG/ACT inhaler Inhale 2 puffs into the lungs 4 times daily Space out to every 6 hours as symptoms improve. 1 Inhaler 0    cetirizine (ZYRTEC) 10 MG tablet Take 10 mg by mouth daily.  vitamin D (ERGOCALCIFEROL) 1.25 MG (45251 UT) CAPS capsule Take 1 capsule by mouth once a week 12 capsule 1     No current facility-administered medications for this visit. Allergies   Allergen Reactions    Cat Hair Extract Swelling     Patient allergic to cats.        Health Maintenance   Topic Date Due    Hepatitis C screen  Never done    Varicella vaccine (1 of 2 - 2-dose childhood series) Never done    Pneumococcal 0-64 years Vaccine (1 of 1 - PPSV23) Never done    HPV vaccine (1 - Male 2-dose series) Never done    HIV screen  Never done    COVID-19 Vaccine (2 - Pfizer 2-dose series) 2021    Flu vaccine (Season Ended) 2021    Potassium monitoring 05/02/2022    Creatinine monitoring  05/02/2022    DTaP/Tdap/Td vaccine (9 - Td) 09/20/2029    Hepatitis B vaccine  Completed    Hib vaccine  Completed    Hepatitis A vaccine  Aged Out    Meningococcal (ACWY) vaccine  Aged Out       Subjective:      Review of Systems   Constitutional: Negative for appetite change, chills, fatigue and fever. HENT: Negative for congestion, sinus pressure, sneezing and sore throat. Eyes: Negative for visual disturbance. Respiratory: Negative for cough, shortness of breath and wheezing. Cardiovascular: Negative for chest pain and palpitations. Gastrointestinal: Negative for abdominal distention, abdominal pain, constipation, nausea and vomiting. Endocrine: Negative for cold intolerance, heat intolerance, polydipsia, polyphagia and polyuria. Genitourinary: Negative for decreased urine volume, difficulty urinating and urgency. Musculoskeletal: Negative for back pain and joint swelling. Skin: Negative for rash. Neurological: Negative for dizziness and headaches. Psychiatric/Behavioral: Negative for sleep disturbance. The patient is not nervous/anxious. All other systems reviewed and are negative. Objective:     Physical Exam  Vitals signs and nursing note reviewed. Constitutional:       General: He is not in acute distress. Appearance: Normal appearance. He is well-developed. He is not diaphoretic. HENT:      Head: Normocephalic and atraumatic. Right Ear: Hearing normal.      Left Ear: Hearing normal.      Mouth/Throat:      Pharynx: Uvula midline. Eyes:      General: No scleral icterus. Conjunctiva/sclera: Conjunctivae normal.      Pupils: Pupils are equal, round, and reactive to light. Neck:      Musculoskeletal: Full passive range of motion without pain and normal range of motion. Thyroid: No thyromegaly. Vascular: No JVD.       Trachea: Phonation normal.   Cardiovascular:      Rate and Rhythm: Normal rate and regular Routine follow-up. No orders of the defined types were placed in this encounter. Orders Placed This Encounter   Medications    PARoxetine (PAXIL) 30 MG tablet     Sig: Take 1 tablet by mouth daily     Dispense:  60 tablet     Refill:  3    sildenafil (VIAGRA) 50 MG tablet     Sig: Take 1 tablet by mouth as needed for Erectile Dysfunction     Dispense:  30 tablet     Refill:  0         Patient given educational materials - see patient instructions. Discussed use, benefit, and side effects of prescribedmedications. All patient questions answered. Pt voiced understanding. Reviewed health maintenance. Instructed to continue current medications, diet and exercise. Patient agreed with treatment plan. Follow up as directed. I spent a total of 15 minutes face to face with this patient. Over 50% of that time was spent on counseling and care coordination. Please see assessment and plan for details. Electronically signed by Ac Villafana MD on 5/5/2021 at 5:29 PM      Please note that this chart was generated using voice recognition Dragon dictation software. Although every effort was made to ensure the accuracy of this automatedtranscription, some errors in transcription may have occurred.

## 2021-05-17 DIAGNOSIS — I10 ESSENTIAL HYPERTENSION: ICD-10-CM

## 2021-05-18 RX ORDER — CHLORTHALIDONE 50 MG/1
TABLET ORAL
Qty: 90 TABLET | Refills: 0 | Status: SHIPPED | OUTPATIENT
Start: 2021-05-18 | End: 2022-01-14 | Stop reason: SDUPTHER

## 2021-05-20 ENCOUNTER — IMMUNIZATION (OUTPATIENT)
Dept: PRIMARY CARE CLINIC | Age: 26
End: 2021-05-20
Payer: COMMERCIAL

## 2021-05-20 PROCEDURE — 91300 COVID-19, PFIZER VACCINE 30MCG/0.3ML DOSE: CPT | Performed by: INTERNAL MEDICINE

## 2021-05-20 PROCEDURE — 0002A COVID-19, PFIZER VACCINE 30MCG/0.3ML DOSE: CPT | Performed by: INTERNAL MEDICINE

## 2021-05-28 ENCOUNTER — OFFICE VISIT (OUTPATIENT)
Dept: PRIMARY CARE CLINIC | Age: 26
End: 2021-05-28
Payer: COMMERCIAL

## 2021-05-28 ENCOUNTER — HOSPITAL ENCOUNTER (OUTPATIENT)
Dept: GENERAL RADIOLOGY | Age: 26
Discharge: HOME | End: 2021-05-30
Payer: COMMERCIAL

## 2021-05-28 ENCOUNTER — HOSPITAL ENCOUNTER (OUTPATIENT)
Age: 26
Discharge: HOME | End: 2021-05-30
Payer: COMMERCIAL

## 2021-05-28 VITALS
HEIGHT: 72 IN | WEIGHT: 272 LBS | RESPIRATION RATE: 20 BRPM | SYSTOLIC BLOOD PRESSURE: 130 MMHG | OXYGEN SATURATION: 99 % | DIASTOLIC BLOOD PRESSURE: 82 MMHG | HEART RATE: 60 BPM | BODY MASS INDEX: 36.84 KG/M2

## 2021-05-28 DIAGNOSIS — M25.561 ACUTE PAIN OF RIGHT KNEE: ICD-10-CM

## 2021-05-28 DIAGNOSIS — M25.461 EFFUSION OF RIGHT KNEE JOINT: ICD-10-CM

## 2021-05-28 DIAGNOSIS — M25.561 ACUTE PAIN OF RIGHT KNEE: Primary | ICD-10-CM

## 2021-05-28 PROCEDURE — 73562 X-RAY EXAM OF KNEE 3: CPT

## 2021-05-28 PROCEDURE — 99213 OFFICE O/P EST LOW 20 MIN: CPT | Performed by: NURSE PRACTITIONER

## 2021-05-28 RX ORDER — PREDNISONE 20 MG/1
TABLET ORAL
Qty: 18 TABLET | Refills: 0 | Status: SHIPPED | OUTPATIENT
Start: 2021-05-28 | End: 2021-07-13

## 2021-05-28 SDOH — ECONOMIC STABILITY: FOOD INSECURITY: WITHIN THE PAST 12 MONTHS, THE FOOD YOU BOUGHT JUST DIDN'T LAST AND YOU DIDN'T HAVE MONEY TO GET MORE.: NEVER TRUE

## 2021-05-28 ASSESSMENT — ENCOUNTER SYMPTOMS
CHEST TIGHTNESS: 0
ABDOMINAL PAIN: 0
SHORTNESS OF BREATH: 0
VOMITING: 0
COLOR CHANGE: 0
SORE THROAT: 0
RHINORRHEA: 0
DIARRHEA: 0
NAUSEA: 0

## 2021-05-28 NOTE — PROGRESS NOTES
earlobe 2014    bilateral gauged earlobe      Past Surgical History:   Procedure Laterality Date    EXTERNAL EAR SURGERY      VENTRICULAR ABLATION SURGERY  11/10/2017       Family History   Problem Relation Age of Onset    Heart Disease Father     High Blood Pressure Father     Diabetes Father     Alcohol Abuse Father     Asthma Mother     Heart Disease Mother     Coronary Art Dis Maternal Grandfather     Coronary Art Dis Paternal Grandfather        Social History     Tobacco Use    Smoking status: Former Smoker     Packs/day: 1.00     Years: 1.00     Pack years: 1.00     Types: Cigarettes     Start date: 2013     Quit date: 2016     Years since quittin.9    Smokeless tobacco: Current User     Types: Snuff, Chew    Tobacco comment: Discussed dental care for oral cancer detection and prevention. Discussed risks for tobacco / nicotine. Substance Use Topics    Alcohol use:  Yes     Alcohol/week: 0.0 standard drinks     Comment: occasional      Current Outpatient Medications   Medication Sig Dispense Refill    predniSONE (DELTASONE) 20 MG tablet Take 3 tablets x 3 days, then 2 tablets x 3 days, then 1 tablet x 3 days 18 tablet 0    chlorthalidone (HYGROTEN) 50 MG tablet TAKE 1 TABLET BY MOUTH EVERY DAY  90 tablet 0    vitamin D (ERGOCALCIFEROL) 1.25 MG (26460 UT) CAPS capsule Take 1 capsule by mouth once a week 12 capsule 1    PARoxetine (PAXIL) 30 MG tablet Take 1 tablet by mouth daily 60 tablet 3    sildenafil (VIAGRA) 50 MG tablet Take 1 tablet by mouth as needed for Erectile Dysfunction 30 tablet 0    amLODIPine (NORVASC) 10 MG tablet TAKE 1 TABLET BY MOUTH ONE TIME A DAY  90 tablet 0    acetaminophen (TYLENOL) 500 MG tablet Take 2 tablets by mouth 3 times daily as needed for Pain 540 tablet 1    montelukast (SINGULAIR) 10 MG tablet Take 1 tablet by mouth nightly 90 tablet 1    albuterol sulfate HFA (PROVENTIL HFA) 108 (90 BASE) MCG/ACT inhaler Inhale 2 puffs into the lungs 4 times daily Space out to every 6 hours as symptoms improve. 1 Inhaler 0    cetirizine (ZYRTEC) 10 MG tablet Take 10 mg by mouth daily. No current facility-administered medications for this visit. Allergies   Allergen Reactions    Cat Hair Extract Swelling     Patient allergic to cats. Health Maintenance   Topic Date Due    Hepatitis C screen  Never done    Varicella vaccine (1 of 2 - 2-dose childhood series) Never done    Pneumococcal 0-64 years Vaccine (1 of 2 - PPSV23) Never done    HPV vaccine (1 - Male 2-dose series) Never done    HIV screen  Never done    Flu vaccine (Season Ended) 09/01/2021    Potassium monitoring  05/02/2022    Creatinine monitoring  05/02/2022    DTaP/Tdap/Td vaccine (9 - Td) 09/20/2029    Hepatitis B vaccine  Completed    Hib vaccine  Completed    COVID-19 Vaccine  Completed    Hepatitis A vaccine  Aged Out    Meningococcal (ACWY) vaccine  Aged Out       Subjective:      Review of Systems   Constitutional: Negative for activity change, fatigue and fever. HENT: Negative for congestion, rhinorrhea and sore throat. Eyes: Negative for visual disturbance. Respiratory: Negative for chest tightness and shortness of breath. Cardiovascular: Negative for chest pain and palpitations. Gastrointestinal: Negative for abdominal pain, diarrhea, nausea and vomiting. Endocrine: Negative for polydipsia. Genitourinary: Negative for difficulty urinating. Musculoskeletal: Positive for arthralgias (right knee pain, see HPI). Negative for myalgias. Skin: Negative for color change. Neurological: Negative for tingling, weakness, numbness and headaches. Psychiatric/Behavioral: Negative for behavioral problems. The patient is not nervous/anxious. All other systems reviewed and are negative.       Objective:   /82 (Site: Left Upper Arm, Position: Sitting, Cuff Size: Large Adult)   Pulse 60   Resp 20   Ht 6' (1.829 m)   Wt 272 lb (123.4 kg)   SpO2 99% BMI 36.89 kg/m²   Physical Exam  Vitals reviewed. Constitutional:       General: He is not in acute distress. Appearance: Normal appearance. HENT:      Head: Normocephalic. Eyes:      Pupils: Pupils are equal, round, and reactive to light. Cardiovascular:      Rate and Rhythm: Normal rate and regular rhythm. Pulses: Normal pulses. Heart sounds: Normal heart sounds. Pulmonary:      Effort: Pulmonary effort is normal.      Breath sounds: Normal breath sounds. Abdominal:      General: There is no distension. Musculoskeletal:         General: Swelling (right knee, crepitus) present. Normal range of motion. Right knee: Swelling, effusion and crepitus present. No erythema or bony tenderness. Normal range of motion. No tenderness. No LCL laxity, MCL laxity, ACL laxity or PCL laxity. Normal alignment, normal meniscus and normal patellar mobility. Normal pulse. Left knee: Normal.      Right lower leg: Swelling (right knee) present. No edema. Left lower leg: No edema. Skin:     General: Skin is warm and dry. Capillary Refill: Capillary refill takes less than 2 seconds. Neurological:      General: No focal deficit present. Mental Status: He is alert and oriented to person, place, and time. Psychiatric:         Mood and Affect: Mood normal.         Behavior: Behavior normal.           :       Diagnosis Orders   1. Acute pain of right knee  XR KNEE RIGHT (3 VIEWS)    predniSONE (DELTASONE) 20 MG tablet   2. Effusion of right knee joint  XR KNEE RIGHT (3 VIEWS)    predniSONE (DELTASONE) 20 MG tablet             :          1. Acute pain of right knee  2. Effusion of right knee joint  New, prednisone taper and RICE, XR. Discussed may need MRI if symptoms persist or worsen, may also consider PT and ortho referral.      - XR KNEE RIGHT (3 VIEWS); Future  - predniSONE (DELTASONE) 20 MG tablet;  Take 3 tablets x 3 days, then 2 tablets x 3 days, then 1 tablet x 3 days Dispense: 18 tablet; Refill: 0    Return if symptoms worsen or fail to improve. Patient given educational materials - see patient instructions. Discussed use, benefit, and side effects of prescribed medications. All patient questions answered. Pt voiced understanding. Reviewed health maintenance. Instructed to continue current medications, diet and exercise. Patient agreed with treatment plan. Follow up as directed.        Electronicallysigned by DAVID Ray CNP on 5/28/2021 at 10:38 AM

## 2021-05-28 NOTE — PATIENT INSTRUCTIONS
Patient Education        Learning About Joint Effusion  What is it? Fluid is normally found in joints such as knees, hips, and elbows. When too much fluid builds up around a joint in your body, it's called joint effusion. When you have this problem, your joint may look swollen. What causes it? Many things can cause fluid buildup in a joint. It may be caused by a condition like osteoarthritis, rheumatoid arthritis, or gout. It may also happen because of an infection. Or it can happen because of an injury, like a twisting fall. What are the symptoms? You might feel pain when you try to straighten a joint where you have fluid buildup. Your joint may be stiff or swollen. How is it diagnosed? Your doctor will do a physical exam. You may need an X-ray. You may need other imaging tests, like an MRI or a CT scan. Your doctor may remove some fluid from your joint to learn more. This is called aspiration. It's done by using a needle to drain fluid from your joint. How is it treated? Your doctor may suggest rest, ice, and raising the joint (elevation) to help with pain and swelling. The fluid might be drained from the area. Your doctor may suggest using nonprescription anti-inflammatory drugs (NSAIDs) or getting a steroid shot. Or you may need surgery to repair damage. Follow-up care is a key part of your treatment and safety. Be sure to make and go to all appointments, and call your doctor if you are having problems. It's also a good idea to know your test results and keep a list of the medicines you take. Current as of: August 5, 2020               Content Version: 12.8  © 2006-2021 [employer]. Care instructions adapted under license by [facility] ([facility]). If you have questions about a medical condition or this instruction, always ask your healthcare professional. [address] any warranty or liability for your use of this information.

## 2021-08-10 ENCOUNTER — OFFICE VISIT (OUTPATIENT)
Dept: PRIMARY CARE CLINIC | Age: 26
End: 2021-08-10
Payer: COMMERCIAL

## 2021-08-10 VITALS
WEIGHT: 267.4 LBS | HEIGHT: 72 IN | HEART RATE: 59 BPM | OXYGEN SATURATION: 96 % | SYSTOLIC BLOOD PRESSURE: 132 MMHG | DIASTOLIC BLOOD PRESSURE: 88 MMHG | RESPIRATION RATE: 16 BRPM | BODY MASS INDEX: 36.22 KG/M2

## 2021-08-10 DIAGNOSIS — R53.83 FATIGUE, UNSPECIFIED TYPE: Primary | ICD-10-CM

## 2021-08-10 DIAGNOSIS — R40.0 DAYTIME SLEEPINESS: ICD-10-CM

## 2021-08-10 DIAGNOSIS — F33.2 SEVERE EPISODE OF RECURRENT MAJOR DEPRESSIVE DISORDER, WITHOUT PSYCHOTIC FEATURES (HCC): ICD-10-CM

## 2021-08-10 PROCEDURE — 99213 OFFICE O/P EST LOW 20 MIN: CPT | Performed by: PHYSICIAN ASSISTANT

## 2021-08-10 RX ORDER — PAROXETINE HYDROCHLORIDE 40 MG/1
40 TABLET, FILM COATED ORAL EVERY MORNING
Qty: 30 TABLET | Refills: 0 | Status: SHIPPED | OUTPATIENT
Start: 2021-08-10 | End: 2021-09-20

## 2021-08-10 ASSESSMENT — ENCOUNTER SYMPTOMS
SWOLLEN GLANDS: 0
SINUS PAIN: 0
ABDOMINAL PAIN: 0
COUGH: 0
CONSTIPATION: 0
EYE PAIN: 0
VOMITING: 0
BACK PAIN: 0
NAUSEA: 0
RHINORRHEA: 0
SHORTNESS OF BREATH: 0
DIARRHEA: 0

## 2021-08-10 NOTE — PROGRESS NOTES
The treatment provided no relief. Hemoglobin A1C (%)   Date Value   2021 5.3             ( goal A1C is < 7)   No results found for: LABMICR  LDL Cholesterol (mg/dL)   Date Value   2021 99       (goal LDL is <100)   AST (U/L)   Date Value   2021 17     ALT (U/L)   Date Value   2021 31     BUN (mg/dL)   Date Value   2021 18     BP Readings from Last 3 Encounters:   08/10/21 132/88   21 130/82   21 132/84          (goal 120/80)    Past Medical History:   Diagnosis Date    Asthma     Back pain     Drug-induced mood disorder (HCC)     Hypertension     PVC (premature ventricular contraction)     Seasonal allergies     Torn earlobe     bilateral gauged earlobe      Past Surgical History:   Procedure Laterality Date    EXTERNAL EAR SURGERY      VENTRICULAR ABLATION SURGERY  11/10/2017       Family History   Problem Relation Age of Onset    Heart Disease Father     High Blood Pressure Father     Diabetes Father     Alcohol Abuse Father     Asthma Mother     Heart Disease Mother     Coronary Art Dis Maternal Grandfather     Coronary Art Dis Paternal Grandfather        Social History     Tobacco Use    Smoking status: Former Smoker     Packs/day: 1.00     Years: 1.00     Pack years: 1.00     Types: Cigarettes     Start date: 2013     Quit date: 2016     Years since quittin.1    Smokeless tobacco: Current User     Types: Snuff, Chew    Tobacco comment: Discussed dental care for oral cancer detection and prevention. Discussed risks for tobacco / nicotine. Substance Use Topics    Alcohol use: Yes     Alcohol/week: 0.0 standard drinks     Comment: occasional      Current Outpatient Medications   Medication Sig Dispense Refill    PARoxetine (PAXIL) 40 MG tablet Take 1 tablet by mouth every morning 30 tablet 0    hydrocortisone 2.5 % ointment Apply topically 2 times daily.  28.35 g 0    chlorthalidone (HYGROTEN) 50 MG tablet TAKE 1 TABLET BY MOUTH EVERY DAY  90 tablet 0    vitamin D (ERGOCALCIFEROL) 1.25 MG (77087 UT) CAPS capsule Take 1 capsule by mouth once a week 12 capsule 1    sildenafil (VIAGRA) 50 MG tablet Take 1 tablet by mouth as needed for Erectile Dysfunction 30 tablet 0    amLODIPine (NORVASC) 10 MG tablet TAKE 1 TABLET BY MOUTH ONE TIME A DAY  90 tablet 0    acetaminophen (TYLENOL) 500 MG tablet Take 2 tablets by mouth 3 times daily as needed for Pain 540 tablet 1    montelukast (SINGULAIR) 10 MG tablet Take 1 tablet by mouth nightly 90 tablet 1    albuterol sulfate HFA (PROVENTIL HFA) 108 (90 BASE) MCG/ACT inhaler Inhale 2 puffs into the lungs 4 times daily Space out to every 6 hours as symptoms improve. 1 Inhaler 0    cetirizine (ZYRTEC) 10 MG tablet Take 10 mg by mouth daily. No current facility-administered medications for this visit. Allergies   Allergen Reactions    Cat Hair Extract Swelling     Patient allergic to cats. Health Maintenance   Topic Date Due    Hepatitis C screen  Never done    Varicella vaccine (1 of 2 - 2-dose childhood series) Never done    Pneumococcal 0-64 years Vaccine (1 of 2 - PPSV23) Never done    HPV vaccine (1 - Male 2-dose series) Never done    HIV screen  Never done    Flu vaccine (1) 09/01/2021    Potassium monitoring  05/02/2022    Creatinine monitoring  05/02/2022    DTaP/Tdap/Td vaccine (9 - Td or Tdap) 09/20/2029    Hepatitis B vaccine  Completed    Hib vaccine  Completed    COVID-19 Vaccine  Completed    Hepatitis A vaccine  Aged Out    Meningococcal (ACWY) vaccine  Aged Out       Subjective:      Review of Systems   Constitutional: Positive for fatigue. Negative for chills and fever. HENT: Negative for congestion, rhinorrhea and sinus pain. Eyes: Negative for pain. Respiratory: Negative for cough and shortness of breath. Cardiovascular: Negative for chest pain and leg swelling.    Gastrointestinal: Negative for abdominal pain, constipation, diarrhea, nausea and vomiting. Genitourinary: Negative for difficulty urinating, enuresis and testicular pain. Musculoskeletal: Negative for arthralgias, back pain and myalgias. Skin: Negative for rash. Neurological: Negative for dizziness, vertigo and headaches. Psychiatric/Behavioral: Negative for confusion and sleep disturbance. The patient is not nervous/anxious. All other systems reviewed and are negative. Objective:     Physical Exam  Vitals and nursing note reviewed. Constitutional:       General: He is not in acute distress. Appearance: Normal appearance. He is obese. HENT:      Head: Normocephalic. Comments: Increased neck circumference. Mallampati score almost 2-3. Mouth/Throat:      Mouth: Mucous membranes are moist.   Eyes:      Extraocular Movements: Extraocular movements intact. Conjunctiva/sclera: Conjunctivae normal.      Pupils: Pupils are equal, round, and reactive to light. Cardiovascular:      Rate and Rhythm: Normal rate and regular rhythm. Pulses: Normal pulses. Heart sounds: Normal heart sounds. Pulmonary:      Effort: Pulmonary effort is normal.      Breath sounds: Normal breath sounds. Abdominal:      General: Abdomen is flat. Bowel sounds are normal.      Palpations: Abdomen is soft. Tenderness: There is no abdominal tenderness. Musculoskeletal:      Cervical back: Normal range of motion. Right lower leg: No edema. Left lower leg: No edema. Lymphadenopathy:      Cervical: No cervical adenopathy. Skin:     General: Skin is warm. Capillary Refill: Capillary refill takes less than 2 seconds. Neurological:      General: No focal deficit present. Mental Status: He is alert and oriented to person, place, and time.    Psychiatric:         Mood and Affect: Mood normal.         Behavior: Behavior normal.       /88 (Site: Left Upper Arm, Position: Sitting, Cuff Size: Large Adult)   Pulse 59   Resp 16   Ht 6'

## 2021-08-12 ENCOUNTER — HOSPITAL ENCOUNTER (OUTPATIENT)
Age: 26
Discharge: HOME | End: 2021-08-12
Payer: COMMERCIAL

## 2021-08-12 ENCOUNTER — TELEPHONE (OUTPATIENT)
Dept: PRIMARY CARE CLINIC | Age: 26
End: 2021-08-12

## 2021-08-12 DIAGNOSIS — E66.9 CLASS 2 OBESITY WITHOUT SERIOUS COMORBIDITY WITH BODY MASS INDEX (BMI) OF 36.0 TO 36.9 IN ADULT, UNSPECIFIED OBESITY TYPE: Primary | ICD-10-CM

## 2021-08-12 DIAGNOSIS — R40.0 DAYTIME SLEEPINESS: ICD-10-CM

## 2021-08-12 DIAGNOSIS — R53.83 FATIGUE, UNSPECIFIED TYPE: ICD-10-CM

## 2021-08-12 LAB
SEX HORMONE BINDING GLOBULIN: 55 NMOL/L (ref 11–80)
TESTOSTERONE FREE-NONMALE: 150 PG/ML (ref 47–244)
TESTOSTERONE TOTAL: 894 NG/DL (ref 220–1000)

## 2021-08-12 PROCEDURE — 84270 ASSAY OF SEX HORMONE GLOBUL: CPT

## 2021-08-12 PROCEDURE — 84403 ASSAY OF TOTAL TESTOSTERONE: CPT

## 2021-08-12 PROCEDURE — 36415 COLL VENOUS BLD VENIPUNCTURE: CPT

## 2021-08-12 NOTE — TELEPHONE ENCOUNTER
Pt is requesting an order for a sleep study due to his blood work coming back within normal limits. Please advise, thank you.

## 2021-09-08 ENCOUNTER — TELEPHONE (OUTPATIENT)
Dept: PRIMARY CARE CLINIC | Age: 26
End: 2021-09-08

## 2021-12-15 ENCOUNTER — OFFICE VISIT (OUTPATIENT)
Dept: FAMILY MEDICINE CLINIC | Age: 26
End: 2021-12-15
Payer: COMMERCIAL

## 2021-12-15 ENCOUNTER — HOSPITAL ENCOUNTER (OUTPATIENT)
Age: 26
Setting detail: SPECIMEN
Discharge: HOME | End: 2021-12-15

## 2021-12-15 ENCOUNTER — E-VISIT (OUTPATIENT)
Dept: PRIMARY CARE CLINIC | Age: 26
End: 2021-12-15
Payer: COMMERCIAL

## 2021-12-15 VITALS
OXYGEN SATURATION: 98 % | TEMPERATURE: 97.3 F | BODY MASS INDEX: 29.26 KG/M2 | HEIGHT: 72 IN | SYSTOLIC BLOOD PRESSURE: 130 MMHG | WEIGHT: 216 LBS | HEART RATE: 76 BPM | DIASTOLIC BLOOD PRESSURE: 66 MMHG

## 2021-12-15 DIAGNOSIS — J06.9 UPPER RESPIRATORY INFECTION WITH COUGH AND CONGESTION: Primary | ICD-10-CM

## 2021-12-15 DIAGNOSIS — Z20.822 CLOSE EXPOSURE TO COVID-19 VIRUS: ICD-10-CM

## 2021-12-15 DIAGNOSIS — R05.9 COUGH: ICD-10-CM

## 2021-12-15 DIAGNOSIS — J06.9 URI WITH COUGH AND CONGESTION: Primary | ICD-10-CM

## 2021-12-15 DIAGNOSIS — Z11.52 ENCOUNTER FOR SCREENING FOR COVID-19: ICD-10-CM

## 2021-12-15 PROCEDURE — 99213 OFFICE O/P EST LOW 20 MIN: CPT | Performed by: NURSE PRACTITIONER

## 2021-12-15 PROCEDURE — 99421 OL DIG E/M SVC 5-10 MIN: CPT | Performed by: NURSE PRACTITIONER

## 2021-12-15 RX ORDER — AZITHROMYCIN 250 MG/1
TABLET, FILM COATED ORAL
Qty: 1 PACKET | Refills: 0 | Status: SHIPPED | OUTPATIENT
Start: 2021-12-15 | End: 2021-12-16

## 2021-12-15 RX ORDER — BROMPHENIRAMINE MALEATE, PSEUDOEPHEDRINE HYDROCHLORIDE, AND DEXTROMETHORPHAN HYDROBROMIDE 2; 30; 10 MG/5ML; MG/5ML; MG/5ML
5 SYRUP ORAL 3 TIMES DAILY PRN
Qty: 118 ML | Refills: 0 | Status: SHIPPED | OUTPATIENT
Start: 2021-12-15 | End: 2021-12-25

## 2021-12-15 ASSESSMENT — LIFESTYLE VARIABLES
SMOKING_YEARS: 5
PACKS_PER_DAY: 1
SMOKING_STATUS: NO, I'M A FORMER SMOKER

## 2021-12-16 ENCOUNTER — TELEPHONE (OUTPATIENT)
Dept: PRIMARY CARE CLINIC | Age: 26
End: 2021-12-16

## 2021-12-16 DIAGNOSIS — Z20.822 CLOSE EXPOSURE TO COVID-19 VIRUS: ICD-10-CM

## 2021-12-16 DIAGNOSIS — Z20.822 SUSPECTED COVID-19 VIRUS INFECTION: Primary | ICD-10-CM

## 2021-12-16 DIAGNOSIS — J06.9 URI WITH COUGH AND CONGESTION: ICD-10-CM

## 2021-12-16 DIAGNOSIS — Z11.52 ENCOUNTER FOR SCREENING FOR COVID-19: ICD-10-CM

## 2021-12-16 RX ORDER — AZITHROMYCIN 250 MG/1
250 TABLET, FILM COATED ORAL SEE ADMIN INSTRUCTIONS
Qty: 6 TABLET | Refills: 0 | Status: SHIPPED | OUTPATIENT
Start: 2021-12-16 | End: 2021-12-21

## 2021-12-16 NOTE — TELEPHONE ENCOUNTER
Lab called to report an incorrect  on Covid swab. Pt needs to be re-swabed. Pt advised.  Please place new order

## 2021-12-16 NOTE — PROGRESS NOTES
Medications, past medical history, allergies and questionnaire reviewed.     Dx: URI with cough and congestion    Tx: Zpack, antihistamine and supportive care    Electronically signed by DAVID Mena CNP on 12/16/2021 at 9:25 AM    Time spent: 7 mins

## 2021-12-16 NOTE — PROGRESS NOTES
[address] WALK-IN  [facility] [facility] [facility] [address]  Dept: [phone]  Dept Fax: [phone]    Mane Palomares is a 32 y.o. male who presents today for his medical conditions/complaints of   Chief Complaint   Patient presents with    Cough    Pharyngitis    Nasal Congestion     all symptoms over 1 week duration          HPI:     /66   Pulse 76   Temp 97.3 °F (36.3 °C) (Temporal)   Ht 6' (1.829 m)   Wt 216 lb (98 kg)   SpO2 98%   BMI 29.29 kg/m²       HPI  Pt presented to the clinic today with c/o congestion. This is a new problem. The current episode started 7 days ago. The problem has been unchanged since onset. Associated symptoms include: sore throat, cough- little production, ear pressure, body aches . Pertinent negatives include: No fever, chills, SOB, chest pain, abdominal pain, loss of taste, smell. Pt has tried Motrin with little improvement.     Vaccinated for COVID:  YES  History of COVID:  NO  Exposure to COVID:  YES    Past Medical History:   Diagnosis Date    Asthma     Back pain     Drug-induced mood disorder (HCC)     Hypertension     PVC (premature ventricular contraction)     Seasonal allergies     Torn earlobe 2014    bilateral gauged earlobe        Past Surgical History:   Procedure Laterality Date    EXTERNAL EAR SURGERY      VENTRICULAR ABLATION SURGERY  11/10/2017       Family History   Problem Relation Age of Onset    Heart Disease Father     High Blood Pressure Father     Diabetes Father     Alcohol Abuse Father     Asthma Mother     Heart Disease Mother     Coronary Art Dis Maternal Grandfather     Coronary Art Dis Paternal Grandfather        Social History     Tobacco Use    Smoking status: Former Smoker     Packs/day: 1.00     Years: 1.00     Pack years: 1.00     Types: Cigarettes     Start date: 2013     Quit date: 2016     Years since quittin.4    Smokeless tobacco: Current User Types: Snuff, Chew    Tobacco comment: Discussed dental care for oral cancer detection and prevention. Discussed risks for tobacco / nicotine. Substance Use Topics    Alcohol use: Yes     Alcohol/week: 0.0 standard drinks     Comment: occasional        Prior to Visit Medications    Medication Sig Taking? Authorizing Provider   brompheniramine-pseudoephedrine-DM 2-30-10 MG/5ML syrup Take 5 mLs by mouth 3 times daily as needed for Congestion or Cough Yes DAVID Hernadez CNP   azithromycin (ZITHROMAX) 250 MG tablet Take 1 tablet by mouth See Admin Instructions for 5 days 500mg on day 1 followed by 250mg on days 2 - 5  DAVID Eaton CNP   benzonatate (TESSALON) 200 MG capsule Take 1 capsule by mouth 3 times daily as needed for Cough  DAVID Eaton CNP   PARoxetine (PAXIL) 40 MG tablet TAKE 1 TABLET BY MOUTH IN THE MORNING   DAVID Eaton CNP   hydrocortisone 2.5 % ointment Apply topically 2 times daily. DAVID Johnson CNP   chlorthalidone (HYGROTEN) 50 MG tablet TAKE 1 TABLET BY MOUTH EVERY DAY   Vinh Mtz MD   vitamin D (ERGOCALCIFEROL) 1.25 MG (96809 UT) CAPS capsule Take 1 capsule by mouth once a week  Vinh Mtz MD   sildenafil (VIAGRA) 50 MG tablet Take 1 tablet by mouth as needed for Erectile Dysfunction  Vinh Mtz MD   amLODIPine (NORVASC) 10 MG tablet TAKE 1 TABLET BY MOUTH ONE TIME A DAY   Vinh Mtz MD   acetaminophen (TYLENOL) 500 MG tablet Take 2 tablets by mouth 3 times daily as needed for Pain  Vinh Mtz MD   montelukast (SINGULAIR) 10 MG tablet Take 1 tablet by mouth nightly  Vinh Mtz MD   albuterol sulfate HFA (PROVENTIL HFA) 108 (90 BASE) MCG/ACT inhaler Inhale 2 puffs into the lungs 4 times daily Space out to every 6 hours as symptoms improve. Beto Carmona,    cetirizine (ZYRTEC) 10 MG tablet Take 10 mg by mouth daily.   Historical Provider, MD       Allergies   Allergen Reactions    Cat Hair Extract Swelling     Patient allergic to cats. Subjective:      Review of Systems   Constitutional: Negative for chills and fever. HENT: Positive for congestion, ear pain (ear pressure) and sore throat. Negative for rhinorrhea, trouble swallowing and voice change. Eyes: Negative for pain and visual disturbance. Respiratory: Positive for cough. Negative for chest tightness and shortness of breath. Cardiovascular: Negative for chest pain, palpitations and leg swelling. Gastrointestinal: Negative for nausea and vomiting. Genitourinary: Negative for decreased urine volume and difficulty urinating. Musculoskeletal: Positive for arthralgias and myalgias. Negative for gait problem and neck pain. Skin: Negative for pallor and rash. Neurological: Negative for weakness, light-headedness and headaches. Psychiatric/Behavioral: Negative for sleep disturbance. Objective:     Physical Exam  Vitals and nursing note reviewed. Constitutional:       General: He is not in acute distress. Appearance: Normal appearance. HENT:      Head: Normocephalic and atraumatic. Right Ear: Tympanic membrane and ear canal normal.      Left Ear: Tympanic membrane and ear canal normal.      Nose: Congestion present. Mouth/Throat:      Lips: Pink. Mouth: Mucous membranes are moist.      Pharynx: Oropharynx is clear. Uvula midline. No oropharyngeal exudate. Eyes:      Extraocular Movements: Extraocular movements intact. Conjunctiva/sclera: Conjunctivae normal.   Cardiovascular:      Rate and Rhythm: Normal rate and regular rhythm. Pulses: Normal pulses. Pulmonary:      Effort: Pulmonary effort is normal. No tachypnea. Breath sounds: Normal breath sounds. Abdominal:      General: Bowel sounds are normal.      Palpations: Abdomen is soft. Musculoskeletal:         General: Normal range of motion. Cervical back: Normal range of motion and neck supple.    Skin:     General: Skin is warm and dry. Capillary Refill: Capillary refill takes less than 2 seconds. Coloration: Skin is not pale. Neurological:      Mental Status: He is alert and oriented to person, place, and time. Coordination: Coordination normal.      Gait: Gait normal.   Psychiatric:         Mood and Affect: Mood normal.         Thought Content: Thought content normal.           MEDICAL DECISION MAKING Assessment/Plan:     Alex Velarde was seen today for cough, pharyngitis and nasal congestion. Diagnoses and all orders for this visit:    URI with cough and congestion  -     Cancel: COVID-19; Future  -     Discontinue: azithromycin (ZITHROMAX) 250 MG tablet; Take 2 tablets (500 mg) on Day 1, followed by 1 tablet (250 mg) once daily on Days 2 through 5.  -     brompheniramine-pseudoephedrine-DM 2-30-10 MG/5ML syrup; Take 5 mLs by mouth 3 times daily as needed for Congestion or Cough    Close exposure to COVID-19 virus  -     Cancel: COVID-19; Future    Encounter for screening for COVID-19  -     Cancel: COVID-19; Future    Cough  -     brompheniramine-pseudoephedrine-DM 2-30-10 MG/5ML syrup; Take 5 mLs by mouth 3 times daily as needed for Congestion or Cough        Results for orders placed or performed during the hospital encounter of 08/12/21   Testosterone Free and Total Male   Result Value Ref Range    Testosterone 894 220 - 1,000 ng/dL    Sex Hormone Binding 55 11 - 80 nmol/L    Testosterone, Free 150.0 47 - 244 pg/mL     Based on the history and exam, I suspect URI  Will send out COVID19 testing. Will start Z-pack to take as directed. Bromphed as directed for cough and congestion. Possible treatment alterations based on the results. Patient instructed to self-quarantine until testing results are back- and to follow the quarantine instructions in the after visit summary. Tylenol / Motrin as directed on the bottle as needed for fever/pain.   Increase fluids, rest.   The patient indicates understanding of these issues and agrees with the plan. Educational materials provided on AVS.  Follow up if symptoms do not improve/worsen. Call with any questions or concerns. Discussed symptoms that will warrant urgent ED evaluation/treatment. Preventing the Spread of Coronavirus Disease 2019 in Homes and Residential Communities: For the most recent information go to: [employer]    Patient given educational materials - see patientinstructions. Discussed use, benefit, and side effects of prescribed medications. All patient questions answered. Pt verbalized understanding. Instructed to continue current medications, diet and exercise. Patient agreed with treatment plan. Follow up as directed.      Electronically signed by DAVID Cummings CNP on 12/17/2021 at 8:36 AM

## 2021-12-16 NOTE — PATIENT INSTRUCTIONS
been exposed to the virus. Don't go to school, work, or public areas. And don't use public transportation. · If you are sick:  ? Leave your home only if you need to get medical care. But call the doctor's office first so they know you're coming. And wear a face cover. ? Wear the face cover whenever you're around other people. It can help stop the spread of the virus when you cough or sneeze. ? Clean and disinfect your home every day. Use household  and disinfectant wipes or sprays. Take special care to clean things that you grab with your hands. These include doorknobs, remote controls, phones, and handles on your refrigerator and microwave. And don't forget countertops, tabletops, bathrooms, and computer keyboards. When to call for help  [id number] anytime you think you may need emergency care. For example, call if:  · You have severe trouble breathing. (You can't talk at all.)  · You have constant chest pain or pressure. · You are severely dizzy or lightheaded. · You are confused or can't think clearly. · Your face and lips have a blue color. · You pass out (lose consciousness) or are very hard to wake up. Call your doctor now if you develop symptoms such as:  · Shortness of breath. · Fever. · Cough. If you need to get care, call ahead to the doctor's office for instructions before you go. Make sure you wear a face cover to prevent exposing other people to the virus. Where can you get the latest information? The following health organizations are tracking and studying this virus. Their websites contain the most up-to-date information. Kathy Correia also learn what to do if you think you may have been exposed to the virus. · U.S. Centers for Disease Control and Prevention (CDC): The CDC provides updated news about the disease and travel advice. The website also tells you how to prevent the spread of infection.  www.cdc.gov  · World Health Organization [facility]): WHO offers information about the virus outbreaks. WHO also has travel advice. www.who.int  Current as of: April 24, 2020               Content Version: 12.4  © 2006-2020 Healthwise, Incorporated. Care instructions adapted under license by your healthcare professional. If you have questions about a medical condition or this instruction, always ask your healthcare professional. [address] any warranty or liability for your use of this information. Coronavirus (QEHUP-50): Care Instructions  Overview  The coronavirus disease (COVID-19) is caused by a virus. It causes a fever, a cough, and shortness of breath. It mainly spreads person-to-person through droplets from coughing and sneezing. The virus also can spread when people are in close contact with someone who is infected. Most people have mild symptoms and can take care of themselves at home. If their symptoms get worse, they may need care in a hospital. There is no medicine to fight the virus. It's important to not spread the virus to others. If you have COVID-19, wear a face cover anytime you are around other people. You need to isolate yourself while you are sick. Your doctor will tell you when you no longer need to be isolated. Leave your home only if you need to get medical care. Follow-up care is a key part of your treatment and safety. Be sure to make and go to all appointments, and call your doctor if you are having problems. It's also a good idea to know your test results and keep a list of the medicines you take. How can you care for yourself at home? · Get extra rest. It can help you feel better. · Drink plenty of fluids. This helps replace fluids lost from fever. Fluids also help ease a scratchy throat. Water, soup, fruit juice, and hot tea with lemon are good choices. · Take acetaminophen (such as Tylenol) to reduce a fever. It may also help with muscle aches. Read and follow all instructions on the label.   · Sponge your body with lukewarm water to help with fever. Don't use cold water or ice. · Use petroleum jelly on sore skin. This can help if the skin around your nose and lips becomes sore from rubbing a lot with tissues. Tips for isolation  · Wear a cloth face cover when you are around other people. It can help stop the spread of the virus when you cough or sneeze. · Limit contact with people in your home. If possible, stay in a separate bedroom and use a separate bathroom. · Avoid contact with pets and other animals. · Cover your mouth and nose with a tissue when you cough or sneeze. Then throw it in the trash right away. · Wash your hands often, especially after you cough or sneeze. Use soap and water, and scrub for at least 20 seconds. If soap and water aren't available, use an alcohol-based hand . · Don't share personal household items. These include bedding, towels, cups and glasses, and eating utensils. · Clean and disinfect your home every day. Use household  and disinfectant wipes or sprays. Take special care to clean things that you grab with your hands. These include doorknobs, remote controls, phones, and handles on your refrigerator and microwave. And don't forget countertops, tabletops, bathrooms, and computer keyboards. When should you call for help? [id number] anytime you think you may need emergency care. For example, call if you have life-threatening symptoms, such as:  · You have severe trouble breathing. (You can't talk at all.)  · You have constant chest pain or pressure. · You are severely dizzy or lightheaded. · You are confused or can't think clearly. · Your face and lips have a blue color. · You pass out (lose consciousness) or are very hard to wake up. Call your doctor now or seek immediate medical care if:  · You have moderate trouble breathing. (You can't speak a full sentence.)  · You are coughing up blood (more than about 1 teaspoon). · You have signs of low blood pressure.  These include feeling lightheaded; being too weak to stand; and having cold, pale, clammy skin. Watch closely for changes in your health, and be sure to contact your doctor if:  · Your symptoms get worse. · You are not getting better as expected. Call before you go to the doctor's office. Follow their instructions. And wear a cloth face cover. Current as of: April 24, 2020               Content Version: 12.4  © 2006-2020 Healthwise, Incorporated. Care instructions adapted under license by your healthcare professional. If you have questions about a medical condition or this instruction, always ask your healthcare professional. [address] any warranty or liability for your use of this information.

## 2021-12-17 ASSESSMENT — ENCOUNTER SYMPTOMS
CHEST TIGHTNESS: 0
EYE PAIN: 0
COUGH: 1
NAUSEA: 0
SORE THROAT: 1
VOICE CHANGE: 0
TROUBLE SWALLOWING: 0
SHORTNESS OF BREATH: 0
RHINORRHEA: 0
VOMITING: 0

## 2022-01-14 DIAGNOSIS — I10 ESSENTIAL HYPERTENSION: ICD-10-CM

## 2022-01-14 RX ORDER — CHLORTHALIDONE 50 MG/1
TABLET ORAL
Qty: 90 TABLET | Refills: 2 | Status: SHIPPED | OUTPATIENT
Start: 2022-01-14 | End: 2022-01-17 | Stop reason: SDUPTHER

## 2022-01-14 RX ORDER — AMLODIPINE BESYLATE 10 MG/1
TABLET ORAL
Qty: 90 TABLET | Refills: 2 | Status: SHIPPED | OUTPATIENT
Start: 2022-01-14 | End: 2022-01-17 | Stop reason: SDUPTHER

## 2022-01-17 ENCOUNTER — PATIENT MESSAGE (OUTPATIENT)
Dept: PRIMARY CARE CLINIC | Age: 27
End: 2022-01-17

## 2022-01-17 DIAGNOSIS — I10 ESSENTIAL HYPERTENSION: ICD-10-CM

## 2022-01-17 RX ORDER — CHLORTHALIDONE 50 MG/1
TABLET ORAL
Qty: 90 TABLET | Refills: 2 | Status: ON HOLD | OUTPATIENT
Start: 2022-01-17 | End: 2022-05-09 | Stop reason: HOSPADM

## 2022-01-17 RX ORDER — AMLODIPINE BESYLATE 10 MG/1
TABLET ORAL
Qty: 90 TABLET | Refills: 2 | Status: ON HOLD | OUTPATIENT
Start: 2022-01-17 | End: 2022-05-09 | Stop reason: SDUPTHER

## 2022-01-17 NOTE — TELEPHONE ENCOUNTER
From: Harshad Chong  To: Mohan Little  Sent: 1/17/2022 8:36 AM EST  Subject: Refill    I have 0 refills left on my chlorthalidone, and amlodipine. Can you send in a new prescription for both. Thank you in advance.

## 2022-02-02 ENCOUNTER — NURSE TRIAGE (OUTPATIENT)
Dept: OTHER | Facility: CLINIC | Age: 27
End: 2022-02-02

## 2022-02-02 NOTE — TELEPHONE ENCOUNTER
Received call from [facility] JOSE at [facility] with [employer]. Subjective: Caller states \"I have been feeling tired and fatigued all the time over the past few years and it is affecting my day to day routine. I even fell asleep in my truck in the parking lot once\"     Current Symptoms: Fatigue, daytime drowsiness. Was unable to get sleep study done due to getting sick    Onset: several years ago; worsening    Associated Symptoms: reduced activity    Pain Severity: 4/10; sharp; waxing and waning    Temperature: None     What has been tried: Several Energy Drinks a  And more periods of rest- still tired    LMP: NA Pregnant: NA    Recommended disposition: See PCP in office within 3 days, advised to seek care in ER/UC if s/s worsen. Care advice provided, patient verbalizes understanding; denies any other questions or concerns; instructed to call back for any new or worsening symptoms. Writer provided warm transfer to Christiano at [facility] for appointment scheduling     Attention Provider: Thank you for allowing me to participate in the care of your patient. The patient was connected to triage in response to information provided to the ECC/PSC. Please do not respond through this encounter as the response is not directed to a shared pool.         Reason for Disposition   Fatigue (i.e., tires easily, decreased energy) and persists > 1 week    Protocols used: WEAKNESS (GENERALIZED) AND FATIGUE-ADULT-OH

## 2022-02-08 ENCOUNTER — OFFICE VISIT (OUTPATIENT)
Dept: PRIMARY CARE CLINIC | Age: 27
End: 2022-02-08
Payer: COMMERCIAL

## 2022-02-08 VITALS
OXYGEN SATURATION: 98 % | SYSTOLIC BLOOD PRESSURE: 134 MMHG | HEART RATE: 59 BPM | DIASTOLIC BLOOD PRESSURE: 88 MMHG | RESPIRATION RATE: 17 BRPM | BODY MASS INDEX: 36.48 KG/M2 | WEIGHT: 269 LBS

## 2022-02-08 DIAGNOSIS — Z23 NEED FOR INFLUENZA VACCINATION: ICD-10-CM

## 2022-02-08 DIAGNOSIS — F32.A DEPRESSION, UNSPECIFIED DEPRESSION TYPE: Primary | ICD-10-CM

## 2022-02-08 DIAGNOSIS — R53.82 CHRONIC FATIGUE: ICD-10-CM

## 2022-02-08 DIAGNOSIS — F41.9 ANXIETY: ICD-10-CM

## 2022-02-08 PROCEDURE — 90471 IMMUNIZATION ADMIN: CPT | Performed by: NURSE PRACTITIONER

## 2022-02-08 PROCEDURE — 99214 OFFICE O/P EST MOD 30 MIN: CPT | Performed by: NURSE PRACTITIONER

## 2022-02-08 PROCEDURE — 90674 CCIIV4 VAC NO PRSV 0.5 ML IM: CPT | Performed by: NURSE PRACTITIONER

## 2022-02-08 RX ORDER — BUSPIRONE HYDROCHLORIDE 10 MG/1
10 TABLET ORAL 2 TIMES DAILY
Qty: 60 TABLET | Refills: 0 | Status: SHIPPED | OUTPATIENT
Start: 2022-02-08 | End: 2022-03-10

## 2022-02-08 ASSESSMENT — ENCOUNTER SYMPTOMS
DIARRHEA: 0
NAUSEA: 0
RHINORRHEA: 0
COLOR CHANGE: 0
VOMITING: 0
SHORTNESS OF BREATH: 0
ABDOMINAL PAIN: 0
CHEST TIGHTNESS: 0
SORE THROAT: 0

## 2022-02-08 ASSESSMENT — PATIENT HEALTH QUESTIONNAIRE - PHQ9
SUM OF ALL RESPONSES TO PHQ QUESTIONS 1-9: 2
SUM OF ALL RESPONSES TO PHQ QUESTIONS 1-9: 2
SUM OF ALL RESPONSES TO PHQ9 QUESTIONS 1 & 2: 2
SUM OF ALL RESPONSES TO PHQ QUESTIONS 1-9: 2
2. FEELING DOWN, DEPRESSED OR HOPELESS: 1
SUM OF ALL RESPONSES TO PHQ QUESTIONS 1-9: 2
1. LITTLE INTEREST OR PLEASURE IN DOING THINGS: 1

## 2022-02-08 NOTE — PROGRESS NOTES
[facility] PRIMARY CARE  [facility] [address]  [address] [address]  Dept: [phone]  Dept Fax: [phone]    Kenroy Beltre is a 32 y.o. male who presentstoday for his medical conditions/complaints as noted below. Kenroy Beltre is c/o of  Chief Complaint   Patient presents with    Fatigue     worsening x1 yr          HPI:     Here with complaint of worsening depression symptoms. Has tried several antidepressants in past without any improvement in mood. Also has some anxiety but denies panic attacks, generally just feels like he has racing thoughts. Has ongoing chronic fatigue that is starting to disrupt his daily work and home schedule. He states he fell asleep in his parked car unprompted and feels he requires frequent naps throughout the day to complete tasks. He sleeps well at night but does not wake up feeling rested. He feels this is related to his uncontrolled depression and anxiety. Has had past screening labs that were generally unremarkable and pt does not feel it necessary to repeat. He has an outpt pscyhiatrist appointment 3/8/2022 to establish care but would like GeneSight testing completed to assess potential medication options.        Hemoglobin A1C (%)   Date Value   05/02/2021 5.3             ( goal A1C is < 7)   No results found for: LABMICR  LDL Cholesterol (mg/dL)   Date Value   05/02/2021 99       (goal LDL is <100)   AST (U/L)   Date Value   05/02/2021 17     ALT (U/L)   Date Value   05/02/2021 31     BUN (mg/dL)   Date Value   05/02/2021 18     BP Readings from Last 3 Encounters:   02/08/22 134/88   12/15/21 130/66   08/10/21 132/88          ([id number])    Past Medical History:   Diagnosis Date    Asthma     Back pain     Drug-induced mood disorder (HCC)     Hypertension     PVC (premature ventricular contraction)     Seasonal allergies     Torn earlobe 2014    bilateral gauged earlobe      Past Surgical History:   Procedure Laterality Date    EXTERNAL EAR SURGERY      VENTRICULAR ABLATION SURGERY  11/10/2017       Family History   Problem Relation Age of Onset    Heart Disease Father     High Blood Pressure Father     Diabetes Father     Alcohol Abuse Father     Asthma Mother     Heart Disease Mother     Coronary Art Dis Maternal Grandfather     Coronary Art Dis Paternal Grandfather        Social History     Tobacco Use    Smoking status: Former Smoker     Packs/day: 1.00     Years: 1.00     Pack years: 1.00     Types: Cigarettes     Start date: 2013     Quit date: 2016     Years since quittin.6    Smokeless tobacco: Current User     Types: Snuff, Chew    Tobacco comment: Discussed dental care for oral cancer detection and prevention. Discussed risks for tobacco / nicotine. Substance Use Topics    Alcohol use: Yes     Alcohol/week: 0.0 standard drinks     Comment: occasional      Current Outpatient Medications   Medication Sig Dispense Refill    busPIRone (BUSPAR) 10 MG tablet Take 1 tablet by mouth 2 times daily 60 tablet 0    chlorthalidone (HYGROTEN) 50 MG tablet TAKE 1 TABLET BY MOUTH EVERY DAY 90 tablet 2    amLODIPine (NORVASC) 10 MG tablet TAKE 1 TABLET BY MOUTH ONE TIME A DAY 90 tablet 2    PARoxetine (PAXIL) 40 MG tablet TAKE 1 TABLET BY MOUTH IN THE MORNING  30 tablet 2    hydrocortisone 2.5 % ointment Apply topically 2 times daily.  28.35 g 0    vitamin D (ERGOCALCIFEROL) 1.25 MG (34677 UT) CAPS capsule Take 1 capsule by mouth once a week 12 capsule 1    sildenafil (VIAGRA) 50 MG tablet Take 1 tablet by mouth as needed for Erectile Dysfunction 30 tablet 0    acetaminophen (TYLENOL) 500 MG tablet Take 2 tablets by mouth 3 times daily as needed for Pain 540 tablet 1    montelukast (SINGULAIR) 10 MG tablet Take 1 tablet by mouth nightly 90 tablet 1    albuterol sulfate HFA (PROVENTIL HFA) 108 (90 BASE) MCG/ACT inhaler Inhale 2 puffs into the lungs 4 times daily Space out to every 6 hours as symptoms improve. 1 Inhaler 0    cetirizine (ZYRTEC) 10 MG tablet Take 10 mg by mouth daily. No current facility-administered medications for this visit. Allergies   Allergen Reactions    Cat Hair Extract Swelling     Patient allergic to cats. Health Maintenance   Topic Date Due    Hepatitis C screen  Never done    Varicella vaccine (1 of 2 - 2-dose childhood series) Never done    Pneumococcal 0-64 years Vaccine (1 of 2 - PPSV23) Never done    HPV vaccine (1 - Male 2-dose series) Never done    HIV screen  Never done    COVID-19 Vaccine (3 - Booster for Pfizer series) 10/20/2021    Depression Monitoring  04/30/2022    Potassium monitoring  05/02/2022    Creatinine monitoring  05/02/2022    DTaP/Tdap/Td vaccine (9 - Td or Tdap) 09/20/2029    Hepatitis B vaccine  Completed    Hib vaccine  Completed    Flu vaccine  Completed    Hepatitis A vaccine  Aged Out    Meningococcal (ACWY) vaccine  Aged Out       Subjective:      Review of Systems   Constitutional: Positive for fatigue. Negative for activity change and fever. HENT: Negative for congestion, rhinorrhea and sore throat. Eyes: Negative for visual disturbance. Respiratory: Negative for chest tightness and shortness of breath. Cardiovascular: Negative for chest pain and palpitations. Gastrointestinal: Negative for abdominal pain, diarrhea, nausea and vomiting. Endocrine: Negative for polydipsia. Genitourinary: Negative for difficulty urinating. Musculoskeletal: Negative for arthralgias and myalgias. Skin: Negative for color change. Neurological: Negative for weakness and headaches. Psychiatric/Behavioral: Positive for decreased concentration. Negative for behavioral problems. The patient is nervous/anxious. All other systems reviewed and are negative. Objective:   /88   Pulse 59   Resp 17   Wt 269 lb (122 kg)   SpO2 98%   BMI 36.48 kg/m²   Physical Exam  Vitals reviewed.    Constitutional: General: He is not in acute distress. Appearance: Normal appearance. HENT:      Head: Normocephalic. Eyes:      Pupils: Pupils are equal, round, and reactive to light. Cardiovascular:      Rate and Rhythm: Normal rate and regular rhythm. Pulses: Normal pulses. Heart sounds: Normal heart sounds. Pulmonary:      Effort: Pulmonary effort is normal.      Breath sounds: Normal breath sounds. Abdominal:      General: There is no distension. Musculoskeletal:         General: Normal range of motion. Cervical back: Neck supple. Right lower leg: No edema. Left lower leg: No edema. Skin:     General: Skin is warm and dry. Capillary Refill: Capillary refill takes less than 2 seconds. Neurological:      General: No focal deficit present. Mental Status: He is alert and oriented to person, place, and time. Psychiatric:         Mood and Affect: Mood normal.         Behavior: Behavior normal.           :       Diagnosis Orders   1. Depression, unspecified depression type     2. Chronic fatigue     3. Anxiety  busPIRone (BUSPAR) 10 MG tablet   4. Need for influenza vaccination  INFLUENZA, MDCK QUADV, 2 YRS AND OLDER, IM, PF, PREFILL SYR OR SDV, 0.5ML (FLUCELVAX QUADV, PF)             :          1. Depression, unspecified depression type  Chronic, uncontrolled. Plan for GeneSight testing and to follow outpt w/ psychiatry for evaluation and possible med management. Declines change in antidepressant today, prefers to await swab results. 2. Chronic fatigue  Uncontrolled, plan for possible medication change after GeneSight and psych eval. May also consider possibility of REMIGIO and plan for sleep study. 3. Anxiety  Chronic, uncontrolled. Plan to start Buspar 10 mg BID to control acute sx. - busPIRone (BUSPAR) 10 MG tablet; Take 1 tablet by mouth 2 times daily  Dispense: 60 tablet; Refill: 0    4.  Need for influenza vaccination  - INFLUENZA, MDCK QUADV, 2 YRS AND OLDER, IM, PF, PREFILL SYR OR SDV, 0.5ML (FLUCELVAX QUADV, PF)    Return in about 3 weeks (around 3/1/2022) for Depression/Anxiety. Patient given educational materials - see patient instructions. Discussed use, benefit, and side effects of prescribed medications. All patient questions answered. Pt voiced understanding. Reviewed health maintenance. Instructed to continue current medications, diet and exercise. Patient agreed with treatment plan. Follow up as directed.        Electronicallysigned by DAVID Lovett CNP on 2/8/2022 at 12:47 PM

## 2022-02-08 NOTE — PATIENT INSTRUCTIONS
Patient Education        Recovering From Depression: Care Instructions  Your Care Instructions     Taking good care of yourself is important as you recover from depression. In time, your symptoms will fade as your treatment takes hold. Do not give up. Instead, focus your energy on getting better. Your mood will improve. It just takes some time. Focus on things that can help you feel better, such as being with friends and family, eating well, and getting enough rest. But take things slowly. Do not do too much too soon. You will begin to feel better gradually. Follow-up care is a key part of your treatment and safety. Be sure to make and go to all appointments, and call your doctor if you are having problems. It's also a good idea to know your test results and keep a list of the medicines you take. How can you care for yourself at home? Be realistic  · If you have a large task to do, break it up into smaller steps you can handle, and just do what you can. · You may want to put off important decisions until your depression has lifted. If you have plans that will have a major impact on your life, such as marriage, divorce, or a job change, try to wait a bit. Talk it over with friends and loved ones who can help you look at the overall picture first.  · Reaching out to people for help is important. Do not isolate yourself. Let your family and friends help you. Find someone you can trust and confide in, and talk to that person. · Be patient, and be kind to yourself. Remember that depression is not your fault and is not something you can overcome with willpower alone. Treatment is important for depression, just like for any other illness. Feeling better takes time, and your mood will improve little by little. Stay active  · Stay busy and get outside. Take a walk, or try some other light exercise. · Talk with your doctor about an exercise program. Exercise can help with mild depression. · Go to a movie or concert. Take part in a Druze activity or other social gathering. Go to a [employer] game. · Ask a friend to have dinner with you. Take care of yourself  · Eat a balanced diet with plenty of fresh fruits and vegetables, whole grains, and lean protein. If you have lost your appetite, eat small snacks rather than large meals. · Avoid using illegal drugs or marijuana and drinking alcohol. Do not take medicines that have not been prescribed for you. They may interfere with medicines you may be taking for depression, or they may make your depression worse. · Take your medicines exactly as they are prescribed. You may start to feel better within 1 to 3 weeks of taking antidepressant medicine. But it can take as many as 6 to 8 weeks to see more improvement. If you have questions or concerns about your medicines, or if you do not notice any improvement by 3 weeks, talk to your doctor. · Continue to take your medicine after your symptoms improve. Taking your medicine for at least 6 months after you feel better can help keep you from getting depressed again. If this isn't the first time you have been depressed, your doctor may recommend you to take medicine even longer. · If you have any side effects from your medicine, tell your doctor. Many side effects are mild and will go away on their own after you have been taking the medicine for a few weeks. Some may last longer. Talk to your doctor if side effects are bothering you too much. You might be able to try a different medicine. · Continue counseling. It may help prevent depression from returning, especially if you've had multiple episodes of depression. Talk with your counselor if you are having a hard time attending your sessions or you think the sessions aren't working. Don't just stop going. · Get enough sleep. Talk to your doctor if you are having problems sleeping. · Avoid sleeping pills unless they are prescribed by the doctor treating your depression.  Sleeping pills may make you groggy during the day, and they may interact with other medicine you are taking. · If you have any other illnesses, such as diabetes, heart disease, or high blood pressure, make sure to continue with your treatment. Tell your doctor about all of the medicines you take, including those with or without a prescription. · If you or someone you know talks about suicide, self-harm, or feeling hopeless, get help right away. Call the [facility] at [phone] ([phone]) or text HOME to 573030 to access the Crisis Text Line. Consider saving these numbers in your phone. When should you call for help? Call 911 anytime you think you may need emergency care. For example, call if:    · You feel like hurting yourself or someone else.     · Someone you know has depression and is about to attempt or is attempting suicide. Call your doctor now or seek immediate medical care if:    · You hear voices.     · Someone you know has depression and:  ? Starts to give away his or her possessions. ? Uses illegal drugs or drinks alcohol heavily. ? Talks or writes about death, including writing suicide notes or talking about guns, knives, or pills. ? Starts to spend a lot of time alone. ? Acts very aggressively or suddenly appears calm. Watch closely for changes in your health, and be sure to contact your doctor if:    · You do not get better as expected. Where can you learn more? Go to https://AddFleetmary louPayByGroup.[employer]. org and sign in to your Sendmebox account. Enter B404 in the TouchOfModern box to learn more about \"Recovering From Depression: Care Instructions. \"     If you do not have an account, please click on the \"Sign Up Now\" link. Current as of: June 16, 2021               Content Version: 13.1  © 8396-0185 Healthwise, Incorporated. Care instructions adapted under license by [facility].  If you have questions about a medical condition or this instruction, always

## 2022-02-18 ENCOUNTER — TELEPHONE (OUTPATIENT)
Dept: PRIMARY CARE CLINIC | Age: 27
End: 2022-02-18

## 2022-02-18 NOTE — TELEPHONE ENCOUNTER
Call made to pt to schedule an appt to discuss his [address] further with his PCP, unable to LVM due to mailbox being full.

## 2022-02-22 ENCOUNTER — OFFICE VISIT (OUTPATIENT)
Dept: PRIMARY CARE CLINIC | Age: 27
End: 2022-02-22
Payer: COMMERCIAL

## 2022-02-22 VITALS
DIASTOLIC BLOOD PRESSURE: 78 MMHG | OXYGEN SATURATION: 97 % | HEART RATE: 83 BPM | BODY MASS INDEX: 35.21 KG/M2 | SYSTOLIC BLOOD PRESSURE: 138 MMHG | WEIGHT: 260 LBS | HEIGHT: 72 IN

## 2022-02-22 DIAGNOSIS — F32.A DEPRESSION, UNSPECIFIED DEPRESSION TYPE: Primary | ICD-10-CM

## 2022-02-22 DIAGNOSIS — G47.9 SLEEP DISTURBANCE: ICD-10-CM

## 2022-02-22 PROCEDURE — 99214 OFFICE O/P EST MOD 30 MIN: CPT | Performed by: NURSE PRACTITIONER

## 2022-02-22 RX ORDER — TRAZODONE HYDROCHLORIDE 50 MG/1
50 TABLET ORAL NIGHTLY PRN
Qty: 90 TABLET | Refills: 1 | Status: ON HOLD | OUTPATIENT
Start: 2022-02-22 | End: 2022-05-09 | Stop reason: SDUPTHER

## 2022-02-22 ASSESSMENT — ENCOUNTER SYMPTOMS
SHORTNESS OF BREATH: 0
ABDOMINAL PAIN: 0
CHEST TIGHTNESS: 0
COLOR CHANGE: 0
NAUSEA: 0
SORE THROAT: 0
VOMITING: 0
DIARRHEA: 0
RHINORRHEA: 0

## 2022-02-22 ASSESSMENT — PATIENT HEALTH QUESTIONNAIRE - PHQ9
4. FEELING TIRED OR HAVING LITTLE ENERGY: 2
7. TROUBLE CONCENTRATING ON THINGS, SUCH AS READING THE NEWSPAPER OR WATCHING TELEVISION: 0
3. TROUBLE FALLING OR STAYING ASLEEP: 1
2. FEELING DOWN, DEPRESSED OR HOPELESS: 0
6. FEELING BAD ABOUT YOURSELF - OR THAT YOU ARE A FAILURE OR HAVE LET YOURSELF OR YOUR FAMILY DOWN: 0
SUM OF ALL RESPONSES TO PHQ QUESTIONS 1-9: 5
SUM OF ALL RESPONSES TO PHQ QUESTIONS 1-9: 5
10. IF YOU CHECKED OFF ANY PROBLEMS, HOW DIFFICULT HAVE THESE PROBLEMS MADE IT FOR YOU TO DO YOUR WORK, TAKE CARE OF THINGS AT HOME, OR GET ALONG WITH OTHER PEOPLE: 0
SUM OF ALL RESPONSES TO PHQ QUESTIONS 1-9: 0
SUM OF ALL RESPONSES TO PHQ QUESTIONS 1-9: 0
SUM OF ALL RESPONSES TO PHQ9 QUESTIONS 1 & 2: 0
SUM OF ALL RESPONSES TO PHQ QUESTIONS 1-9: 0
SUM OF ALL RESPONSES TO PHQ QUESTIONS 1-9: 5
2. FEELING DOWN, DEPRESSED OR HOPELESS: 1
SUM OF ALL RESPONSES TO PHQ QUESTIONS 1-9: 5
1. LITTLE INTEREST OR PLEASURE IN DOING THINGS: 0
9. THOUGHTS THAT YOU WOULD BE BETTER OFF DEAD, OR OF HURTING YOURSELF: 0
5. POOR APPETITE OR OVEREATING: 1
8. MOVING OR SPEAKING SO SLOWLY THAT OTHER PEOPLE COULD HAVE NOTICED. OR THE OPPOSITE, BEING SO FIGETY OR RESTLESS THAT YOU HAVE BEEN MOVING AROUND A LOT MORE THAN USUAL: 0
SUM OF ALL RESPONSES TO PHQ QUESTIONS 1-9: 0

## 2022-02-22 NOTE — PROGRESS NOTES
Tobacco Use    Smoking status: Former Smoker     Packs/day: 1.00     Years: 1.00     Pack years: 1.00     Types: Cigarettes     Start date: 2013     Quit date: 2016     Years since quittin.6    Smokeless tobacco: Current User     Types: Snuff, Chew    Tobacco comment: Discussed dental care for oral cancer detection and prevention. Discussed risks for tobacco / nicotine. Substance Use Topics    Alcohol use: Yes     Alcohol/week: 0.0 standard drinks     Comment: occasional      Current Outpatient Medications   Medication Sig Dispense Refill    traZODone (DESYREL) 50 MG tablet Take 1 tablet by mouth nightly as needed for Depression 90 tablet 1    busPIRone (BUSPAR) 10 MG tablet Take 1 tablet by mouth 2 times daily 60 tablet 0    chlorthalidone (HYGROTEN) 50 MG tablet TAKE 1 TABLET BY MOUTH EVERY DAY 90 tablet 2    amLODIPine (NORVASC) 10 MG tablet TAKE 1 TABLET BY MOUTH ONE TIME A DAY 90 tablet 2    hydrocortisone 2.5 % ointment Apply topically 2 times daily. 28.35 g 0    vitamin D (ERGOCALCIFEROL) 1.25 MG (79206 UT) CAPS capsule Take 1 capsule by mouth once a week 12 capsule 1    sildenafil (VIAGRA) 50 MG tablet Take 1 tablet by mouth as needed for Erectile Dysfunction 30 tablet 0    acetaminophen (TYLENOL) 500 MG tablet Take 2 tablets by mouth 3 times daily as needed for Pain 540 tablet 1    montelukast (SINGULAIR) 10 MG tablet Take 1 tablet by mouth nightly 90 tablet 1    albuterol sulfate HFA (PROVENTIL HFA) 108 (90 BASE) MCG/ACT inhaler Inhale 2 puffs into the lungs 4 times daily Space out to every 6 hours as symptoms improve. 1 Inhaler 0    cetirizine (ZYRTEC) 10 MG tablet Take 10 mg by mouth daily. No current facility-administered medications for this visit. Allergies   Allergen Reactions    Cat Hair Extract Swelling     Patient allergic to cats.        Health Maintenance   Topic Date Due    Hepatitis C screen  Never done    Varicella vaccine (1 of 2 - 2-dose childhood series) Never done    HPV vaccine (1 - Male 2-dose series) Never done    HIV screen  Never done    COVID-19 Vaccine (3 - Booster for Pfizer series) 10/20/2021    Pneumococcal 0-64 years Vaccine (1 of 2 - PPSV23) 02/22/2023 (Originally 5/12/2001)    Potassium monitoring  05/02/2022    Creatinine monitoring  05/02/2022    Depression Monitoring  02/08/2023    DTaP/Tdap/Td vaccine (9 - Td or Tdap) 09/20/2029    Hepatitis B vaccine  Completed    Hib vaccine  Completed    Flu vaccine  Completed    Hepatitis A vaccine  Aged Out    Meningococcal (ACWY) vaccine  Aged Out       Subjective:      Review of Systems   Constitutional: Negative for activity change, fatigue and fever. HENT: Negative for congestion, rhinorrhea and sore throat. Eyes: Negative for visual disturbance. Respiratory: Negative for chest tightness and shortness of breath. Cardiovascular: Negative for chest pain and palpitations. Gastrointestinal: Negative for abdominal pain, diarrhea, nausea and vomiting. Endocrine: Negative for polydipsia. Genitourinary: Negative for difficulty urinating. Musculoskeletal: Negative for arthralgias and myalgias. Skin: Negative for color change. Neurological: Negative for weakness and headaches. Psychiatric/Behavioral: Positive for sleep disturbance. Negative for behavioral problems and self-injury. The patient is not nervous/anxious. All other systems reviewed and are negative. Objective:   /78   Pulse 83   Ht 6' (1.829 m)   Wt 260 lb (117.9 kg)   SpO2 97%   BMI 35.26 kg/m²   Physical Exam  Vitals reviewed. Constitutional:       General: He is not in acute distress. Appearance: Normal appearance. HENT:      Head: Normocephalic. Eyes:      Pupils: Pupils are equal, round, and reactive to light. Cardiovascular:      Rate and Rhythm: Normal rate and regular rhythm. Pulses: Normal pulses. Heart sounds: Normal heart sounds.    Pulmonary: Effort: Pulmonary effort is normal.      Breath sounds: Normal breath sounds. Abdominal:      General: There is no distension. Musculoskeletal:         General: Normal range of motion. Cervical back: Neck supple. Right lower leg: No edema. Left lower leg: No edema. Lymphadenopathy:      Cervical: No cervical adenopathy. Skin:     General: Skin is warm and dry. Capillary Refill: Capillary refill takes less than 2 seconds. Neurological:      General: No focal deficit present. Mental Status: He is alert and oriented to person, place, and time. Psychiatric:         Mood and Affect: Mood normal.         Behavior: Behavior normal.           :       Diagnosis Orders   1. Depression, unspecified depression type  traZODone (DESYREL) 50 MG tablet   2. Sleep disturbance               :          1. Depression, unspecified depression type  2. Sleep disturbance   Uncontrolled, will trial trazodone for depression and also sleep. We will plan for recheck in 4 to 6 weeks or if establish with psych and feels he does not need follow-up with me, will see him in 6 months. - traZODone (DESYREL) 50 MG tablet; Take 1 tablet by mouth nightly as needed for Depression  Dispense: 90 tablet; Refill: 1    Return in about 6 weeks (around 4/5/2022) for Depression/Anxiety. Patient given educational materials - see patient instructions. Discussed use, benefit, and side effects of prescribed medications. All patient questions answered. Pt voiced understanding. Reviewed health maintenance. Instructed to continue current medications, diet and exercise. Patient agreed with treatment plan. Follow up as directed.        Electronicallysigned by Donney Barthel, APRN - CNP on 2/22/2022 at 3:56 PM

## 2022-02-22 NOTE — PATIENT INSTRUCTIONS
Patient Education        Recovering From Depression: Care Instructions  Your Care Instructions     Taking good care of yourself is important as you recover from depression. In time, your symptoms will fade as your treatment takes hold. Do not give up. Instead, focus your energy on getting better. Your mood will improve. It just takes some time. Focus on things that can help you feel better, such as being with friends and family, eating well, and getting enough rest. But take things slowly. Do not do too much too soon. You will begin to feel better gradually. Follow-up care is a key part of your treatment and safety. Be sure to make and go to all appointments, and call your doctor if you are having problems. It's also a good idea to know your test results and keep a list of the medicines you take. How can you care for yourself at home? Be realistic  · If you have a large task to do, break it up into smaller steps you can handle, and just do what you can. · You may want to put off important decisions until your depression has lifted. If you have plans that will have a major impact on your life, such as marriage, divorce, or a job change, try to wait a bit. Talk it over with friends and loved ones who can help you look at the overall picture first.  · Reaching out to people for help is important. Do not isolate yourself. Let your family and friends help you. Find someone you can trust and confide in, and talk to that person. · Be patient, and be kind to yourself. Remember that depression is not your fault and is not something you can overcome with willpower alone. Treatment is important for depression, just like for any other illness. Feeling better takes time, and your mood will improve little by little. Stay active  · Stay busy and get outside. Take a walk, or try some other light exercise. · Talk with your doctor about an exercise program. Exercise can help with mild depression. · Go to a movie or concert. Take part in a [employer] activity or other social gathering. Go to a [employer] game. · Ask a friend to have dinner with you. Take care of yourself  · Eat a balanced diet with plenty of fresh fruits and vegetables, whole grains, and lean protein. If you have lost your appetite, eat small snacks rather than large meals. · Avoid using illegal drugs or marijuana and drinking alcohol. Do not take medicines that have not been prescribed for you. They may interfere with medicines you may be taking for depression, or they may make your depression worse. · Take your medicines exactly as they are prescribed. You may start to feel better within 1 to 3 weeks of taking antidepressant medicine. But it can take as many as 6 to 8 weeks to see more improvement. If you have questions or concerns about your medicines, or if you do not notice any improvement by 3 weeks, talk to your doctor. · Continue to take your medicine after your symptoms improve. Taking your medicine for at least 6 months after you feel better can help keep you from getting depressed again. If this isn't the first time you have been depressed, your doctor may recommend you to take medicine even longer. · If you have any side effects from your medicine, tell your doctor. Many side effects are mild and will go away on their own after you have been taking the medicine for a few weeks. Some may last longer. Talk to your doctor if side effects are bothering you too much. You might be able to try a different medicine. · Continue counseling. It may help prevent depression from returning, especially if you've had multiple episodes of depression. Talk with your counselor if you are having a hard time attending your sessions or you think the sessions aren't working. Don't just stop going. · Get enough sleep. Talk to your doctor if you are having problems sleeping. · Avoid sleeping pills unless they are prescribed by the doctor treating your depression.  Sleeping pills may make you groggy during the day, and they may interact with other medicine you are taking. · If you have any other illnesses, such as diabetes, heart disease, or high blood pressure, make sure to continue with your treatment. Tell your doctor about all of the medicines you take, including those with or without a prescription. · If you or someone you know talks about suicide, self-harm, or feeling hopeless, get help right away. Call the [address] [facility] at 1-800-273-talk (8-889.758.2760) or text HOME to 089429 to access the [employer] Text Line. Consider saving these numbers in your phone. When should you call for help? Call 421 anytime you think you may need emergency care. For example, call if:    · You feel like hurting yourself or someone else.     · Someone you know has depression and is about to attempt or is attempting suicide. Call your doctor now or seek immediate medical care if:    · You hear voices.     · Someone you know has depression and:  ? Starts to give away his or her possessions. ? Uses illegal drugs or drinks alcohol heavily. ? Talks or writes about death, including writing suicide notes or talking about guns, knives, or pills. ? Starts to spend a lot of time alone. ? Acts very aggressively or suddenly appears calm. Watch closely for changes in your health, and be sure to contact your doctor if:    · You do not get better as expected. Where can you learn more? Go to https://[employer]SpotRight.Parkmobile. org and sign in to your [employer] account. Enter U612 in the [employer] box to learn more about \"Recovering From Depression: Care Instructions. \"     If you do not have an account, please click on the \"Sign Up Now\" link. Current as of: June 16, 2021               Content Version: 13.1  © 6591-2347 [employer]. Care instructions adapted under license by [facility] ([facility]).  If you have questions about a medical condition or this instruction, always ask your healthcare professional. [place] [address] any warranty or liability for your use of this information.

## 2022-03-01 ENCOUNTER — TELEPHONE (OUTPATIENT)
Dept: PRIMARY CARE CLINIC | Age: 27
End: 2022-03-01

## 2022-04-10 DIAGNOSIS — F33.2 SEVERE EPISODE OF RECURRENT MAJOR DEPRESSIVE DISORDER, WITHOUT PSYCHOTIC FEATURES (HCC): ICD-10-CM

## 2022-04-11 RX ORDER — PAROXETINE HYDROCHLORIDE 40 MG/1
TABLET, FILM COATED ORAL
Qty: 30 TABLET | Refills: 3 | Status: ON HOLD | OUTPATIENT
Start: 2022-04-11 | End: 2022-05-09 | Stop reason: HOSPADM

## 2022-05-05 ENCOUNTER — HOSPITAL ENCOUNTER (INPATIENT)
Age: 27
LOS: 4 days | Discharge: HOME | DRG: 885 | End: 2022-05-09
Attending: STUDENT IN AN ORGANIZED HEALTH CARE EDUCATION/TRAINING PROGRAM | Admitting: PSYCHIATRY & NEUROLOGY
Payer: COMMERCIAL

## 2022-05-05 DIAGNOSIS — I10 ESSENTIAL HYPERTENSION: ICD-10-CM

## 2022-05-05 DIAGNOSIS — R45.851 SUICIDAL IDEATIONS: Primary | ICD-10-CM

## 2022-05-05 DIAGNOSIS — F32.A DEPRESSION, UNSPECIFIED DEPRESSION TYPE: ICD-10-CM

## 2022-05-05 LAB
ABSOLUTE EOS #: 0.5 K/UL (ref 0–0.4)
ABSOLUTE LYMPH #: 2.1 K/UL (ref 1–4.8)
ABSOLUTE MONO #: 0.6 K/UL (ref 0.1–1.3)
ALBUMIN SERPL-MCNC: 4.7 G/DL (ref 3.5–5.2)
ALP BLD-CCNC: 63 U/L (ref 40–129)
ALT SERPL-CCNC: 38 U/L (ref 5–41)
AMPHETAMINE SCREEN URINE: NEGATIVE
ANION GAP SERPL CALCULATED.3IONS-SCNC: 16 MMOL/L (ref 9–17)
AST SERPL-CCNC: 23 U/L
BACTERIA: ABNORMAL
BARBITURATE SCREEN URINE: NEGATIVE
BASOPHILS # BLD: 1 % (ref 0–2)
BASOPHILS ABSOLUTE: 0.1 K/UL (ref 0–0.2)
BENZODIAZEPINE SCREEN, URINE: NEGATIVE
BILIRUB SERPL-MCNC: 0.29 MG/DL (ref 0.3–1.2)
BILIRUBIN URINE: NEGATIVE
BUN BLDV-MCNC: 12 MG/DL (ref 6–20)
CALCIUM SERPL-MCNC: 10 MG/DL (ref 8.6–10.4)
CANNABINOID SCREEN URINE: NEGATIVE
CASTS UA: ABNORMAL /LPF
CHLORIDE BLD-SCNC: 103 MMOL/L (ref 98–107)
CO2: 21 MMOL/L (ref 20–31)
COCAINE METABOLITE, URINE: NEGATIVE
COLOR: YELLOW
CREAT SERPL-MCNC: 0.92 MG/DL (ref 0.7–1.2)
EOSINOPHILS RELATIVE PERCENT: 5 % (ref 0–4)
EPITHELIAL CELLS UA: ABNORMAL /HPF
ETHANOL PERCENT: 0.11 %
ETHANOL: 107 MG/DL
GFR AFRICAN AMERICAN: >60 ML/MIN
GFR NON-AFRICAN AMERICAN: >60 ML/MIN
GFR SERPL CREATININE-BSD FRML MDRD: ABNORMAL ML/MIN/{1.73_M2}
GLUCOSE BLD-MCNC: 158 MG/DL (ref 70–99)
GLUCOSE URINE: ABNORMAL
HCT VFR BLD CALC: 46.9 % (ref 41–53)
HEMOGLOBIN: 16.1 G/DL (ref 13.5–17.5)
KETONES, URINE: NEGATIVE
LEUKOCYTE ESTERASE, URINE: NEGATIVE
LYMPHOCYTES # BLD: 18 % (ref 24–44)
MAGNESIUM: 2 MG/DL (ref 1.6–2.6)
MCH RBC QN AUTO: 27.4 PG (ref 26–34)
MCHC RBC AUTO-ENTMCNC: 34.4 G/DL (ref 31–37)
MCV RBC AUTO: 79.7 FL (ref 80–100)
METHADONE SCREEN, URINE: NEGATIVE
MONOCYTES # BLD: 5 % (ref 1–7)
NITRITE, URINE: NEGATIVE
OPIATES, URINE: NEGATIVE
OXYCODONE SCREEN URINE: NEGATIVE
PDW BLD-RTO: 13.8 % (ref 11.5–14.9)
PH UA: 5 (ref 5–8)
PHENCYCLIDINE, URINE: NEGATIVE
PLATELET # BLD: 227 K/UL (ref 150–450)
PMV BLD AUTO: 9.4 FL (ref 6–12)
POTASSIUM SERPL-SCNC: 3.4 MMOL/L (ref 3.7–5.3)
PROTEIN UA: NEGATIVE
RBC # BLD: 5.89 M/UL (ref 4.5–5.9)
RBC UA: ABNORMAL /HPF
SEG NEUTROPHILS: 71 % (ref 36–66)
SEGMENTED NEUTROPHILS ABSOLUTE COUNT: 8.3 K/UL (ref 1.3–9.1)
SODIUM BLD-SCNC: 140 MMOL/L (ref 135–144)
SPECIFIC GRAVITY UA: 1.01 (ref 1–1.03)
TEST INFORMATION: NORMAL
TOTAL PROTEIN: 7.7 G/DL (ref 6.4–8.3)
TURBIDITY: CLEAR
URINE HGB: NEGATIVE
UROBILINOGEN, URINE: NORMAL
WBC # BLD: 11.7 K/UL (ref 3.5–11)
WBC UA: ABNORMAL /HPF

## 2022-05-05 PROCEDURE — 80307 DRUG TEST PRSMV CHEM ANLYZR: CPT

## 2022-05-05 PROCEDURE — 83735 ASSAY OF MAGNESIUM: CPT

## 2022-05-05 PROCEDURE — 36415 COLL VENOUS BLD VENIPUNCTURE: CPT

## 2022-05-05 PROCEDURE — 1240000000 HC EMOTIONAL WELLNESS R&B

## 2022-05-05 PROCEDURE — G0480 DRUG TEST DEF 1-7 CLASSES: HCPCS

## 2022-05-05 PROCEDURE — 81001 URINALYSIS AUTO W/SCOPE: CPT

## 2022-05-05 PROCEDURE — 85025 COMPLETE CBC W/AUTO DIFF WBC: CPT

## 2022-05-05 PROCEDURE — 6370000000 HC RX 637 (ALT 250 FOR IP): Performed by: PSYCHIATRY & NEUROLOGY

## 2022-05-05 PROCEDURE — 80053 COMPREHEN METABOLIC PANEL: CPT

## 2022-05-05 PROCEDURE — 99285 EMERGENCY DEPT VISIT HI MDM: CPT

## 2022-05-05 RX ORDER — MAGNESIUM HYDROXIDE/ALUMINUM HYDROXICE/SIMETHICONE 120; 1200; 1200 MG/30ML; MG/30ML; MG/30ML
30 SUSPENSION ORAL EVERY 6 HOURS PRN
Status: DISCONTINUED | OUTPATIENT
Start: 2022-05-05 | End: 2022-05-09 | Stop reason: HOSPADM

## 2022-05-05 RX ORDER — POLYETHYLENE GLYCOL 3350 17 G/17G
17 POWDER, FOR SOLUTION ORAL DAILY PRN
Status: DISCONTINUED | OUTPATIENT
Start: 2022-05-05 | End: 2022-05-09 | Stop reason: HOSPADM

## 2022-05-05 RX ORDER — TRAZODONE HYDROCHLORIDE 50 MG/1
50 TABLET ORAL NIGHTLY PRN
Status: DISCONTINUED | OUTPATIENT
Start: 2022-05-05 | End: 2022-05-09 | Stop reason: HOSPADM

## 2022-05-05 RX ORDER — HYDROXYZINE 50 MG/1
50 TABLET, FILM COATED ORAL 3 TIMES DAILY PRN
Status: DISCONTINUED | OUTPATIENT
Start: 2022-05-05 | End: 2022-05-09 | Stop reason: HOSPADM

## 2022-05-05 RX ORDER — ACETAMINOPHEN 325 MG/1
650 TABLET ORAL EVERY 6 HOURS PRN
Status: DISCONTINUED | OUTPATIENT
Start: 2022-05-05 | End: 2022-05-09 | Stop reason: HOSPADM

## 2022-05-05 RX ORDER — TRAZODONE HYDROCHLORIDE 50 MG/1
50 TABLET ORAL NIGHTLY PRN
Status: DISCONTINUED | OUTPATIENT
Start: 2022-05-06 | End: 2022-05-05

## 2022-05-05 RX ORDER — IBUPROFEN 400 MG/1
400 TABLET ORAL EVERY 6 HOURS PRN
Status: DISCONTINUED | OUTPATIENT
Start: 2022-05-05 | End: 2022-05-09 | Stop reason: HOSPADM

## 2022-05-05 RX ORDER — TRAZODONE HYDROCHLORIDE 50 MG/1
50 TABLET ORAL NIGHTLY PRN
Status: DISCONTINUED | OUTPATIENT
Start: 2022-05-05 | End: 2022-05-05

## 2022-05-05 RX ADMIN — HYDROXYZINE HYDROCHLORIDE 50 MG: 50 TABLET, FILM COATED ORAL at 23:17

## 2022-05-05 RX ADMIN — TRAZODONE HYDROCHLORIDE 50 MG: 50 TABLET ORAL at 23:16

## 2022-05-05 ASSESSMENT — ENCOUNTER SYMPTOMS
VOMITING: 0
SHORTNESS OF BREATH: 0
EYE PAIN: 0
ABDOMINAL PAIN: 0
COLOR CHANGE: 0
EYE REDNESS: 0
NAUSEA: 0
FACIAL SWELLING: 0
SORE THROAT: 0
BACK PAIN: 0
COUGH: 0
DIARRHEA: 0
RHINORRHEA: 0

## 2022-05-05 ASSESSMENT — LIFESTYLE VARIABLES
HOW MANY STANDARD DRINKS CONTAINING ALCOHOL DO YOU HAVE ON A TYPICAL DAY: 3 OR 4
HOW MANY STANDARD DRINKS CONTAINING ALCOHOL DO YOU HAVE ON A TYPICAL DAY: 3 OR 4
HOW OFTEN DO YOU HAVE A DRINK CONTAINING ALCOHOL: 4 OR MORE TIMES A WEEK
HOW OFTEN DO YOU HAVE A DRINK CONTAINING ALCOHOL: 4 OR MORE TIMES A WEEK

## 2022-05-05 ASSESSMENT — PATIENT HEALTH QUESTIONNAIRE - PHQ9: SUM OF ALL RESPONSES TO PHQ QUESTIONS 1-9: 18

## 2022-05-05 ASSESSMENT — SLEEP AND FATIGUE QUESTIONNAIRES
DO YOU HAVE DIFFICULTY SLEEPING: NO
AVERAGE NUMBER OF SLEEP HOURS: 6
DO YOU USE A SLEEP AID: NO

## 2022-05-06 PROBLEM — F33.3 MAJOR DEPRESSIVE DISORDER, RECURRENT, SEVERE WITH PSYCHOTIC FEATURES (HCC): Status: ACTIVE | Noted: 2022-05-06

## 2022-05-06 PROBLEM — F10.20 ALCOHOL USE DISORDER, MODERATE, IN CONTROLLED ENVIRONMENT (HCC): Status: ACTIVE | Noted: 2022-05-06

## 2022-05-06 PROCEDURE — APPSS180 APP SPLIT SHARED TIME > 60 MINUTES: Performed by: NURSE PRACTITIONER

## 2022-05-06 PROCEDURE — 99223 1ST HOSP IP/OBS HIGH 75: CPT | Performed by: INTERNAL MEDICINE

## 2022-05-06 PROCEDURE — 1240000000 HC EMOTIONAL WELLNESS R&B

## 2022-05-06 PROCEDURE — 90792 PSYCH DIAG EVAL W/MED SRVCS: CPT | Performed by: PSYCHIATRY & NEUROLOGY

## 2022-05-06 PROCEDURE — 6370000000 HC RX 637 (ALT 250 FOR IP): Performed by: PSYCHIATRY & NEUROLOGY

## 2022-05-06 RX ORDER — ARIPIPRAZOLE 2 MG/1
2 TABLET ORAL DAILY
Status: DISCONTINUED | OUTPATIENT
Start: 2022-05-06 | End: 2022-05-09 | Stop reason: HOSPADM

## 2022-05-06 RX ORDER — CETIRIZINE HYDROCHLORIDE 10 MG/1
10 TABLET ORAL DAILY
Status: DISCONTINUED | OUTPATIENT
Start: 2022-05-06 | End: 2022-05-09 | Stop reason: HOSPADM

## 2022-05-06 RX ORDER — CHLORTHALIDONE 25 MG/1
50 TABLET ORAL DAILY
Status: DISCONTINUED | OUTPATIENT
Start: 2022-05-06 | End: 2022-05-09 | Stop reason: HOSPADM

## 2022-05-06 RX ORDER — ARIPIPRAZOLE 2 MG/1
2 TABLET ORAL DAILY
Status: ON HOLD | COMMUNITY
End: 2022-05-09 | Stop reason: SDUPTHER

## 2022-05-06 RX ORDER — AMLODIPINE BESYLATE 5 MG/1
5 TABLET ORAL DAILY
Status: DISCONTINUED | OUTPATIENT
Start: 2022-05-06 | End: 2022-05-09 | Stop reason: HOSPADM

## 2022-05-06 RX ORDER — PAROXETINE HYDROCHLORIDE 40 MG/1
40 TABLET, FILM COATED ORAL DAILY
Status: DISCONTINUED | OUTPATIENT
Start: 2022-05-06 | End: 2022-05-06

## 2022-05-06 RX ORDER — IBUPROFEN 800 MG/1
800 TABLET ORAL EVERY 6 HOURS PRN
COMMUNITY
End: 2022-08-24

## 2022-05-06 RX ADMIN — CETIRIZINE HYDROCHLORIDE 10 MG: 10 TABLET, FILM COATED ORAL at 13:28

## 2022-05-06 RX ADMIN — BUPROPION HYDROCHLORIDE 50 MG: 150 TABLET, FILM COATED, EXTENDED RELEASE ORAL at 13:27

## 2022-05-06 RX ADMIN — ARIPIPRAZOLE 2 MG: 5 TABLET ORAL at 13:27

## 2022-05-06 RX ADMIN — AMLODIPINE BESYLATE 5 MG: 5 TABLET ORAL at 13:28

## 2022-05-06 RX ADMIN — CHLORTHALIDONE 50 MG: 25 TABLET ORAL at 13:30

## 2022-05-06 NOTE — GROUP NOTE
Group Therapy Note    Date: 5/6/2022    Group Start Time: 1330  Group End Time: 1841  Group Topic: Music Therapy    SARAH Nugent        Group Therapy Note    Pt did not attend music therapy group d/t resting in room despite staff invitation to attend. 1:1 talk time offered as alternative to group session, pt declined.

## 2022-05-06 NOTE — H&P
Department of Psychiatry  Attending Physician Psychiatric Assessment     Reason for Admission to Psychiatric Unit:  Concerns about patient's safety in the community    CHIEF COMPLAINT: Suicidal ideation with plan to shoot self or drive into a pole    History obtained from: Patient, electronic medical record          HISTORY OF PRESENT ILLNESS:    Freddie Handy is a 32 y.o. male who has a past medical history of cyclothymic disorder, premature ventricular contraction, and anger. Patient presented to the ED endorsing suicidal ideation with a plan to end his life by shooting himself. Patient does verbalize having access to a shotgun. Patient is admitted voluntarily. At time of assessment, patient reports worsening depression. States that he has having relationship issues, but does not wish to discuss these with writer. Evasive in discussing situation, but verbalizes intense suicidal ideation with thoughts to shoot himself or run his car into a pole. Believes he was very close to engaging in harm. Does not feel safe from self in the community. Discussed removing gun from home, and patient is adamantly opposed. Reports that it belonged to his grandfather. We reviewed patient's symptoms. He endorses multiple episodes of depression lasting 2 weeks or longer in which she felt down and depressed more days than not for majority of the day. Endorses feelings of hopelessness, helplessness, guilt and shame. Also reports poor energy and motivation, hypersomnia, and increased suicidal ideation. He denies any history of suicide attempts but does endorse self-mutilation. States that he used to engage in burning himself, but that was years ago. Patient states that when he is down and depressed, he believes people are talking about him at work. Believes that this is beyond his normal level of anxiety and nods his head indicating that he feels it is more delusional and paranoid.   States that this paranoia is only present when his mood is depressed, and denies feeling this way at baseline. Patient denies any history of perceptual disturbances. Denies any other psychotic phenomena besides what was discussed above. Explored for manic episodes. Patient does verbalize irritability and impulsiveness that occur for a period of approximately 2 days. States that he has anger issues, but they are always present with situational stressors. He endorses racing thoughts that are constantly present, and denies that they occur episodically. He denies any periods of needing less sleep for a period of 4 days or longer. Patient does verbalize intrusive thoughts. States that on a daily basis he has distressing thoughts of his family dying. He states he is unable to avoid these thoughts and needs to be active in order to distract himself. Patient denies having to perform any specific task or repetitive movement in order to have these intrusive thoughts go away. Patient does endorse anxiety, but denies that it is present on a daily basis or that it impairs his daily life. He denies any history of trauma, abuse or neglect or any associated symptoms with PTSD. Patient has a previous psychiatric history. Reports following up with [facility] and sees a therapist, Bean Dove as well as a mental health provider, Bridger Garcia. Reports his current medications are Abilify and Paxil. He felt that they were helpful at first, but are not therapeutic recently. Previous medications include fluoxetine, sertraline, escitalopram, citalopram, venlafaxine, bupropion, and quetiapine. Does not feel that any of these medications were helpful, but denies any side effects. Patient reports a history of alcohol abuse, and currently consumes multiple beers nightly to help \"take the edge off a long day of work. \"  Patient minimizing of his alcohol use. Reports having a DUI and is on probation. He is unable to drive.   He was noted to have a blood alcohol level of 0.107. Liver panel obtained, within normal limits. Urine toxicology is negative. Does verbalize using cannabis approximately once a week, but denies any other recreational substances. Patient does have a history of opioid dependence, but has been sober for many years. Patient unable to contract for safety off unit. Requires inpatient hospitalization for the above-noted psychiatric concerns. History of head trauma: [] Yes [x] No    History of seizures: [] Yes [x] No  History of violence or aggression: [x] Yes [] No -endorses anger and irritability. No violence against staff noted. PSYCHIATRIC HISTORY:  [x] Yes [] No    Currently follows with [facility] and sees a therapist, Jeramy Lemus.   Psychotropic providers, [place] [facility]  Denies lifetime suicide attempts  Endorses 1 previous psychiatric hospital admission to [facility] 7 years ago    Current psychiatric medications includes: Abilify and Paxil  Past psychiatric medications includes: Fluoxetine, sertraline, citalopram, escitalopram, venlafaxine, bupropion, quetiapine  Home medication compliance: Yes    Adverse reactions from psychotropic medications: No         Lifetime Psychiatric Review of Systems         Depression: Endorses     Anxiety: Endorses, but does not meet criteria for generalized anxiety disorder     Panic Attacks: Denies     Luann or Hypomania: Does not meet criteria     Phobias: denies     Obsessions and Compulsions: Possible     Body or Vocal Tics: denies     Visual Hallucinations: denies     Auditory Hallucinations: denies     Delusions/Paranoia: Endorses paranoia with depressive episodes     PTSD: denies    Past Medical History:        Diagnosis Date    Asthma     Back pain     Drug-induced mood disorder ([facility] [address]     Hypertension     PVC (premature ventricular contraction)     Seasonal allergies     Torn earlobe 2014    bilateral gauged earlobe       Past Surgical History:        Procedure Laterality Date    EXTERNAL EAR SURGERY      VENTRICULAR ABLATION SURGERY  11/10/2017       Allergies:  Cat hair extract         Social History:     Born in: [place], moved to [facility] [place] [facility] at the age of 6  Family: Reports raised by his mother. Had a good childhood. Highest Level of Education: 3 older brothers. Has poor relationship with all of them. Occupation: Works full-time as a . Reports no problems with his work. Financial stressors at this time. Marital Status: Relationship issues. Reports engaged. Children: 2 biological children aged 11 and 2. He has raised fiancés oldest child aged 10, and considers that his own child. Residence: Reports living with his fiancée and children. Stressors:family, legal, marital and drug and alcohol  Patient Assets/Supportive Factors: Family and community support present (connectedness) and Seeks additional support         DRUG USE HISTORY  Social History     Tobacco Use   Smoking Status Former Smoker    Packs/day: 1.00    Years: 1.00    Pack years: 1.00    Types: Cigarettes    Start date: 2013   [facility] Quit date: 2016    Years since quittin.8   Smokeless Tobacco Current User    Types: Snuff, Chew   Tobacco Comment    Discussed dental care for oral cancer detection and prevention. Discussed risks for tobacco / nicotine. Social History     Substance and Sexual Activity   Alcohol Use Yes    Alcohol/week: 0.0 standard drinks    Comment: occasional     Social History     Substance and Sexual Activity   Drug Use Not Currently    Types: Cocaine, Marijuana ([facility]), Opiates , Other-see comments    Comment: No longer uses. Hx of heroin use     Urine toxicology negative. Reports once weekly marijuana use. EtOH 0.107%. Endorses consumption of multiple beers per evening. Denies any history of alcohol withdrawal.  No symptoms of withdrawal observed at this time. Vitals within appropriate range and no pupillary dilation present.     Patient reports utilizing chewing tobacco.         LEGAL HISTORY:   HISTORY OF INCARCERATION: [] Yes [x] No  Reports multiple misdemeanors. Currently on probation for DUI. Family History:       Problem Relation Age of Onset    Heart Disease Father     High Blood Pressure Father     Diabetes Father     Alcohol Abuse Father     Asthma Mother     Heart Disease Mother     Coronary Art Dis Maternal Grandfather     Coronary Art Dis Paternal Grandfather        Psychiatric Family History  Patient reports psychiatric family history. Suicides in family: [] Yes [x] No    Substance use in family: [x] Yes [] No -father alcohol abuse         PHYSICAL EXAM:  Vitals:  /74   Pulse 56   Temp 98.4 °F (36.9 °C) (Oral)   Resp 14   Ht 6' (1.829 m)   Wt 265 lb (120.2 kg)   SpO2 96%   BMI 35.94 kg/m²     Pain: Endorses oral pain. Requesting hydrocodone. Offered Orajel, Tylenol or ibuprofen. Patient refuses all. Internal medicine consulted. LABS:  Labs reviewed: [x] Yes  Urine toxicology negative  EtOH 0.107%  Liver panel: WNL  Hypokalemia: 3.4  Elevated serum glucose  Last EKG in EMR reviewed: [x] Yes from 2017  QTC: 395         Review of Systems   Constitutional: Negative for chills and weight loss. HENT: Negative for ear pain and nosebleeds. Eyes: Negative for blurred vision and photophobia. Respiratory: Negative for cough, shortness of breath and wheezing. Cardiovascular: Negative for chest pain and palpitations. Gastrointestinal: Negative for abdominal pain, diarrhea and vomiting. Genitourinary: Negative for dysuria and urgency. Musculoskeletal: Negative for falls and joint pain. Skin: Positive for rash and itching bilateral upper forearms. Neurological: Negative for tremors, seizures and weakness. Endo/Heme/Allergies: Does not bruise/bleed easily. Physical Exam:   Constitutional:  Appears well-developed and well-nourished, no acute distress. HENT:   Head: Normocephalic and atraumatic.    Eyes: Conjunctivae are normal. Right eye exhibits no discharge. Left eye exhibits no discharge. No scleral icterus. Neck: Normal range of motion. Neck supple. Pulmonary/Chest:  No respiratory distress or accessory muscle use, no wheezing. Cardiac: Regular rate and rhythm. Abdominal: Soft. Non-tender. Exhibits no distension. Musculoskeletal: Normal range of motion. Exhibits no edema. Neurological: cranial nerves II-XII grossly in tact, normal gait and station. Skin: Skin is warm and dry. Patient is not diaphoretic. No erythema. Mental Status Examination:    Level of consciousness: Awake and alert  Appearance:  Appropriate attire, seated in chair, fair grooming   Behavior/Motor: Approachable, no psychomotor abnormalities noted  Attitude toward examiner:  Cooperative, attentive, good eye contact  Speech: Normal rate, volume, and tone. Mood: depressed  Affect:  Congruent  Thought processes:  Linear and goal-directed  Thought content: Active suicidal thoughts with a plan to shoot himself or drive his car into a pole. Has access to both methods. Refuses to remove the gun from home.               Denies homicidal ideations               Denies perceptual disturbances              Denies delusions              endorses paranoia, but not at present  Cognition:  Oriented to self, location, time, situation  Concentration: [place]   Memory: recent and remote memory intact  Insight &Judgment: impaired judgment         DSM-5 Diagnosis    Principal Problem: Major depressive disorder, recurrent, severe with psychotic features ([address]    Alcohol use disorder moderate  Rule out cluster B personality disorder  Rule out bipolar disorder  Rule out obsessive-compulsive disorder    Psychosocial and Contextual factors:  Financial   Occupational x  Relationship x  Legal x  Living situation x  Educational     Past Medical History:   Diagnosis Date    Asthma     Back pain     Drug-induced mood disorder ([address]     Hypertension     PVC (premature ventricular contraction)     Seasonal allergies     Torn earlobe 2014    bilateral gauged earlobe        TREATMENT CONSIDERATIONS    Continue inpatient psychiatric treatment. Home medications reviewed. Medications as discussed with attending:  Optimize patient's home medications. Abilify 2 mg daily  Paxil 50 mg daily  Monitor need and frequency of PRN medications. Attempt to develop insight. Follow-up daily while inpatient. Reviewed risks and benefits as well as potential side effects with patient. CONSULT:  [x] Yes [] No  Internal medicine for medical management/medical H&P      Risk Management: close watch per standard protocol      Psychotherapy: participation in milieu and group and individual sessions with Attending Physician,  and Physician Assistant/CNP      Estimated length of stay:  2-14 days      GENERAL PATIENT/FAMILY EDUCATION  Patient will understand basic signs and symptoms, patient will understand benefits/risks and potential side effects from proposed medications, and patient will understand their role in recovery. Family is active in patient's care. Patient assets that may be helpful during treatment include: Intent to participate and engage in treatment, sufficient fund of knowledge and intellect to understand and utilize treatments. Goals:    1) Remission of suicidal ideation. 2) Stabilization of symptoms prior to discharge. 3) Establish efficacy and tolerability of medications. Behavioral Services  Medicare Certification     Admission Day 1  I certify that this patient's inpatient psychiatric hospital admission is medically necessary for:    x (1) treatment which could reasonably be expected to improve this patient's condition, or    x (2) diagnostic study or its equivalent.      Time Spent: 1 hour     Jessica Cushing is a 32 y.o. male being evaluated face to face    --DAVID Barclay CNP on 5/6/2022 at 4:13 PM    An electronic signature was used to authenticate this note. Psychiatry Attending Attestation     I independently saw and evaluated the patient. I reviewed the Advance Practice Provider's documentation above. Any additional comments or changes to the Advance Practice Provider's documentation are stated below otherwise agree with assessment. Patient is a 44-year-old  male with history of depression, currently taking Paxil and Abilify, admitted for worsening suicidal thoughts with a plan to kill self by a gun. Reports she has been feeling increasingly depressed and frustrated for several weeks now. Could not identify any specific stressors however notes that his anger has been getting out of control. Reports some feelings of helplessness and hopelessness. Reports having trouble falling asleep and staying asleep. Reports poor appetite. PLAN  Will restart home medication and titrate to effect  Attempt to develop insight  Psycho-education conducted. Supportive Therapy conducted.   Probable discharge is next week  Follow-up TBD    Electronically signed by Katya Giang MD on 5/6/22 at 6:37 PM EDT

## 2022-05-06 NOTE — ED NOTES
Provisional Diagnosis:     Patient presented to ED after experiencing suicidal ideation. Psychosocial and Contextual Factors:     Patient has history of substance abuse issues. C-SSRS Summary:    Patient reports SI \"without a specific plan\" but he did identify to ED physician he had thoughts to shoot himself and he has access to guns. Patient: X  Family:   Agency:     Substance Abuse:  Patient currently drinks regularly \"after work to wind down\". Patient reports he has been sober from heroin since 2006. Present Suicidal Behavior:    Patient reports SI \"without a specific plan\" but he did identify to ED physician he had thoughts to shoot himself and he has access to guns. Verbal: X    Attempt:    Past Suicidal Behavior:   Patient denies any previous suicide attempts, but reports previous thoughts/ideations. Verbal: X    Attempt:    Self-Injurious/Self-Mutilation:  Patient denies    Violence Current or Past:   None identified or documented. Patient calm and cooperative in [place]. Trauma Identified:    Patient reports stress at work, and a DUI from last year he is still \"dealing with\". Protective Factors:    Patient has support in family/friends. Patient has insurance. Patient has housing. Patient has income/employment. Patient linked with [facility]. Patient takes medications as prescribed. Patient follows up with outpatient therapy. Risk Factors:    Patient has history of substance abuse. Patient reports his medications aren't working. Clinical Summary:    Patient is a 32year old  male who presented to ED at the suggestion of [facility] after experiencing suicidal ideation. Patient reports SI without a specific plan to this writer but did identify that he had thoughts to shoot himself to the ED physician. Patient identified he does have access to guns. Patient reports regular alcohol use to \"wind down from work\".   Patient also reports being sober from heroin for several years. Patient is linked with [facility] for therapy and medication management. Patient reports he takes his medications as prescribed, but that he has been experiencing an increase in his depression and suicidal ideation for the past several months. Patient denies HI. Patient denies hallucinations. Patient denies any previous psychiatric admissions. Patient does present with depressed mood and feelings of hopelessness. Patient also reports difficulty sleeping and poor appetite. Patient reports one his days off of work his children \"have to go to  because I'm too tired to care for them\". Patient reports he doesn't sleep at night and ends up sleeping all day. Level of Care Disposition: This writer consulted with Dr ROBLEDO who recommended inpatient hospitalization for safety and stabilization.   Patient signed application for voluntary admission to [facility],

## 2022-05-06 NOTE — BH NOTE
Patient given tobacco quitline number 93040857155 at this time, refusing to call at this time, states \" I just dont want to quit now\"- patient given information as to the dangers of long term tobacco use. Continue to reinforce the importance of tobacco cessation.

## 2022-05-06 NOTE — PROGRESS NOTES
Pharmacy Medication History Note      List of current medications patient is taking is complete. Source of information: [facility] ([address] [place] [address]), [place]    Changes made to medication list:  Medications removed (include reason, ex. therapy complete or physician discontinued, noncompliance):  Hydrocortisone (therapy completed), Vitamin D (list clean up), Sildenafil (list clean up), Acetaminophen (therapy completed), Abluterol (therapy completed), Montelukast (list clean up)    Medications added/doses adjusted: Added Aripiprazole 2 mg daily  Added Ibuprofen 800 mg every 6 hours as needed    Other notes (ex. Recent course of antibiotics, Coumadin dosing):  Patient recently completed a course of Amoxicillin and Norco 5/325 - filled 4/27/22. The patient was also on Buspirone but has not filled prescription since February. Please let me know if you have any questions about this encounter. Thank you!     Electronically signed by Cami Byrd [facility] on 5/6/2022 at 9:46 AM

## 2022-05-06 NOTE — H&P
[place] [address] Internal Medicine    CONSULTATION / HISTORY AND PHYSICAL EXAMINATION            Date:   5/6/2022  Patient name:  Meek Diaz  Date of admission:  5/5/2022  8:07 PM  MRN:   [id number]  Account:  [de-identified]  YOB: 1995  PCP:    DAVID Amezquita CNP  Room:   [address] [place] [address]  Code Status:    Full Code    Physician Requesting Consult: Ellen Partida, *    Reason for Consult:  medical management    Chief Complaint:     Chief Complaint   Patient presents with    Mental Health Problem       History Obtained From:     Patient medical record nursing staff    History of Present Illness:   Patient with past medical history multiple medical problems include hypertension, depression, history of alcohol abuse. Patient admitted to [facility] floor with major depression, suicidal ideation  He never diagnosed with chronic medical condition like diabetes, coronary artery disease  Feeling better  He has rash in both forearms, he told me he itches a lot    Past Medical History:     Past Medical History:   Diagnosis Date    Asthma     Back pain     Drug-induced mood disorder ([address]     Hypertension     PVC (premature ventricular contraction)     Seasonal allergies     Torn earlobe 2014    bilateral gauged earlobe        Past Surgical History:     Past Surgical History:   Procedure Laterality Date    EXTERNAL EAR SURGERY      VENTRICULAR ABLATION SURGERY  11/10/2017        Medications Prior to Admission:     Prior to Admission medications    Medication Sig Start Date End Date Taking?  Authorizing Provider   ARIPiprazole (ABILIFY) 2 MG tablet Take 2 mg by mouth daily   Yes Historical Provider, MD   ibuprofen (ADVIL;MOTRIN) 800 MG tablet Take 800 mg by mouth every 6 hours as needed for Pain   Yes Historical Provider, MD   PARoxetine (PAXIL) 40 MG tablet TAKE 1 TABLET BY MOUTH IN THE MORNING 4/11/22   DAVID Sarah CNP   traZODone (DESYREL) 50 MG tablet Take 1 tablet by mouth nightly as needed for Depression 2/22/22   Mickiel Gloss, APRN - CNP   chlorthalidone (HYGROTEN) 50 MG tablet TAKE 1 TABLET BY MOUTH EVERY DAY 1/17/22   Mickiel Gloss, APRN - CNP   amLODIPine (NORVASC) 10 MG tablet TAKE 1 TABLET BY MOUTH ONE TIME A DAY 1/17/22   Mickiel Gloss, APRN - CNP   cetirizine (ZYRTEC) 10 MG tablet Take 10 mg by mouth daily. Historical Provider, MD        Allergies:     Cat hair extract    Social History:     Tobacco:    reports that he quit smoking about 5 years ago. His smoking use included cigarettes. He started smoking about 8 years ago. He has a 1.00 pack-year smoking history. His smokeless tobacco use includes snuff and chew. Alcohol:      reports current alcohol use. Drug Use:  reports previous drug use. Drugs: Cocaine, Marijuana ([place]), Opiates , and Other-see comments. Family History:     Family History   Problem Relation Age of Onset    Heart Disease Father     High Blood Pressure Father     Diabetes Father     Alcohol Abuse Father     Asthma Mother     Heart Disease Mother     Coronary Art Dis Maternal Grandfather     Coronary Art Dis Paternal Grandfather        Review of Systems:     Positive and Negative as described in HPI. CONSTITUTIONAL:  negative for fevers, chills, sweats, fatigue, weight loss  HEENT:  negative for vision, hearing changes, runny nose, throat pain  RESPIRATORY:  negative for shortness of breath, cough, congestion, wheezing. CARDIOVASCULAR:  negative for chest pain, palpitations.   GASTROINTESTINAL:  negative for nausea, vomiting, diarrhea, constipation, change in bowel habits, abdominal pain   GENITOURINARY:  negative for difficulty of urination, burning with urination, frequency   INTEGUMENT:  negative for rash, skin lesions, easy bruising   HEMATOLOGIC/LYMPHATIC:  negative for swelling/edema   ALLERGIC/IMMUNOLOGIC:  negative for urticaria , itching  ENDOCRINE:  negative increase in drinking, increase in urination, hot or cold intolerance  MUSCULOSKELETAL:  negative joint pains, muscle aches, swelling of joints  NEUROLOGICAL:  negative for headaches, dizziness, lightheadedness, numbness, pain, tingling extremities  BEHAVIOR/PSYCH: Depressed    Physical Exam:     /74   Pulse 56   Temp 98.4 °F (36.9 °C) (Oral)   Resp 14   Ht 6' (1.829 m)   Wt 265 lb (120.2 kg)   SpO2 96%   BMI 35.94 kg/m²   Temp (24hrs), Av.3 °F (36.8 °C), Min:98.2 °F (36.8 °C), Max:98.4 °F (36.9 °C)    No results for input(s): POCGLU in the last 72 hours. No intake or output data in the 24 hours ending 22 1515    General Appearance:  alert, well appearing, and in no acute distress, central obesity present  Mental status: oriented to person, place, and time with normal affect  Head:  normocephalic, atraumatic. Eye: no icterus, redness, pupils equal and reactive, extraocular eye movements intact, conjunctiva clear  Ear: normal external ear, no discharge, hearing intact  Nose:  no drainage noted  Mouth: mucous membranes moist  Neck: supple, no carotid bruits, thyroid not palpable  Lungs: Bilateral equal air entry, clear to ausculation, no wheezing, rales or rhonchi, normal effort  Cardiovascular: normal rate, regular rhythm, no murmur, gallop, rub. Abdomen: Soft, nontender, nondistended, normal bowel sounds, no hepatomegaly or splenomegaly  Neurologic: There are no new focal motor or sensory deficits, normal muscle tone and bulk, no abnormal sensation, normal speech, cranial nerves II through XII grossly intact  Skin: No gross lesions, rashes, bruising or bleeding on exposed skin area  Extremities: Rash in both forearms  Psych:      Investigations:      Laboratory Testing:  Recent Results (from the past 24 hour(s))   CBC with Auto Differential    Collection Time: 22  8:30 PM   Result Value Ref Range    WBC 11.7 (H) 3.5 - 11.0 k/uL    RBC 5.89 4.5 - 5.9 m/uL    Hemoglobin 16.1 13.5 - 17.5 g/dL    Hematocrit 46.9 41 - 53 % MCV 79.7 (L) 80 - 100 fL    MCH 27.4 26 - 34 pg    MCHC 34.4 31 - 37 g/dL    RDW 13.8 11.5 - 14.9 %    Platelets 138 925 - 408 k/uL    MPV 9.4 6.0 - 12.0 fL    Seg Neutrophils 71 (H) 36 - 66 %    Lymphocytes 18 (L) 24 - 44 %    Monocytes 5 1 - 7 %    Eosinophils % 5 (H) 0 - 4 %    Basophils 1 0 - 2 %    Segs Absolute 8.30 1.3 - 9.1 k/uL    Absolute Lymph # 2.10 1.0 - 4.8 k/uL    Absolute Mono # 0.60 0.1 - 1.3 k/uL    Absolute Eos # 0.50 (H) 0.0 - 0.4 k/uL    Basophils Absolute 0.10 0.0 - 0.2 k/uL   Comprehensive Metabolic Panel w/ Reflex to MG    Collection Time: 05/05/22  8:30 PM   Result Value Ref Range    Glucose 158 (H) 70 - 99 mg/dL    BUN 12 6 - 20 mg/dL    CREATININE 0.92 0.70 - 1.20 mg/dL    Calcium 10.0 8.6 - 10.4 mg/dL    Sodium 140 135 - 144 mmol/L    Potassium 3.4 (L) 3.7 - 5.3 mmol/L    Chloride 103 98 - 107 mmol/L    CO2 21 20 - 31 mmol/L    Anion Gap 16 9 - 17 mmol/L    Alkaline Phosphatase 63 40 - 129 U/L    ALT 38 5 - 41 U/L    AST 23 <40 U/L    Total Bilirubin 0.29 (L) 0.3 - 1.2 mg/dL    Total Protein 7.7 6.4 - 8.3 g/dL    Albumin 4.7 3.5 - 5.2 g/dL    GFR Non-African American >60 >60 mL/min    GFR African American >60 >60 mL/min    GFR Comment         Urinalysis with Microscopic    Collection Time: 05/05/22  8:30 PM   Result Value Ref Range    Color, UA Yellow Yellow    Turbidity UA Clear Clear    Glucose, Ur TRACE (A) NEGATIVE    Bilirubin Urine NEGATIVE NEGATIVE    Ketones, Urine NEGATIVE NEGATIVE    Specific Gravity, UA 1.006 1.000 - 1.030    Urine Hgb NEGATIVE NEGATIVE    pH, UA 5.0 5.0 - 8.0    Protein, UA NEGATIVE NEGATIVE    Urobilinogen, Urine Normal Normal    Nitrite, Urine NEGATIVE NEGATIVE    Leukocyte Esterase, Urine NEGATIVE NEGATIVE    WBC, UA 0 TO 2 /HPF    RBC, UA 0 TO 2 /HPF    Casts UA 0 TO 2 /LPF    Epithelial Cells UA 0 TO 2 /HPF    Bacteria, UA None None   Ethanol    Collection Time: 05/05/22  8:30 PM   Result Value Ref Range    Ethanol 107 (H) <10 mg/dL    Ethanol percent 0.107 %   Urine Drug Screen    Collection Time: 05/05/22  8:30 PM   Result Value Ref Range    Amphetamine Screen, Ur NEGATIVE NEGATIVE    Barbiturate Screen, Ur NEGATIVE NEGATIVE    Benzodiazepine Screen, Urine NEGATIVE NEGATIVE    Cocaine Metabolite, Urine NEGATIVE NEGATIVE    Methadone Screen, Urine NEGATIVE NEGATIVE    Opiates, Urine NEGATIVE NEGATIVE    Phencyclidine, Urine NEGATIVE NEGATIVE    Cannabinoid Scrn, Ur NEGATIVE NEGATIVE    Oxycodone Screen, Ur NEGATIVE NEGATIVE    Test Information       Assay provides medical screening only. The absence of expected drug(s) and/or metabolite(s) may indicate diluted or adulterated urine, limitations of testing or timing of collection. Magnesium    Collection Time: 05/05/22  8:30 PM   Result Value Ref Range    Magnesium 2.0 1.6 - 2.6 mg/dL           Consultations:   IP CONSULT TO INTERNAL MEDICINE  Assessment :      Primary Problem  Depression with suicidal ideation    Active Hospital Problems    Diagnosis Date Noted    Depression with suicidal ideation [F32. A, R45.851] 05/05/2022     Priority: Medium     Principal Problem:    Depression with suicidal ideation  Active Problems:    Essential hypertension  Resolved Problems:    * No resolved hospital problems. *      Plan:     1. Hypertension, restarted home dose of chlorthalidone and Norvasc, controlled  2. Hypokalemia, patient was given 40 mg of potassium pills yesterday, will recheck BMP tomorrow  3. Major depression, managed by psychiatrist  4. Patient has rash in both forearms he is itching a lot, on hydroxyzine as needed        Zheng Murcia MD  5/6/2022  3:15 PM    Copy sent to Dr. Cedric Simms, APRN - CNP    Please note that this chart was generated using voice recognition Dragon dictation software. Although every effort was made to ensure the accuracy of this automated transcription, some errors in transcription may have occurred.

## 2022-05-06 NOTE — BH NOTE
[facility]  Admission Note     Admission Type:   Admission Type: Voluntary    Reason for admission:  Reason for Admission: Patient having suicidal thoughts to shoot himself, depression has been constant for weeks    PATIENT STRENGTHS:    Support from family, Demonstrates effective coping skills    Patient Strengths and Limitations:    Support from family    Addictive Behavior:   Addictive Behavior  In the Past 3 Months, Have You Felt or Has Someone Told You That You Have a Problem With  : None    Medical Problems:   Past Medical History:   Diagnosis Date    Asthma     Back pain     Drug-induced mood disorder ([address]     Hypertension     PVC (premature ventricular contraction)     Seasonal allergies     Torn earlobe 2014    bilateral gauged earlobe       Status EXAM:  Mental Status and Behavioral Exam  Normal: No  Level of Assistance: Independent/Self  Facial Expression: Expressionless,Flat  Affect: Appropriate  Level of Consciousness: Alert  Frequency of Checks: 4 times per hour, close  Mood:Normal: No  Mood: Depressed  Motor Activity:Normal: Yes  Eye Contact: Good  Observed Behavior: Cooperative  Sexual Misconduct History: Past - no  Preception: [place] to person,[place] to time,[place] to place,[place] to situation  Attention:Normal: No  Thought Processes: Other (comment) (logical thought process)  Thought Content:Normal: Yes  Depression Symptoms: No problems reported or observed. Anxiety Symptoms: Generalized  Luann Symptoms: No problems reported or observed.   Hallucinations: None  Delusions: No  Memory:Normal: Yes  Insight and Judgment: Yes    Tobacco Screening:  Practical Counseling, on admission, pankaj X, if applicable and completed (first 3 are required if patient doesn't refuse):            ( )  Recognizing danger situations (included triggers and roadblocks)                    ( )  Coping skills (new ways to manage stress, exercise, relaxation techniques, changing routine, distraction) ( )  Basic information about quitting (benefits of quitting, techniques in how to quit, available resources  ( ) Referral for counseling faxed to [facility]                                    (X) Patient refused counseling  ( ) Patient has not smoked in the last 30 days    Metabolic Screening:    Lab Results   Component Value Date    LABA1C 5.3 05/02/2021       No results found for: CHOL  No results found for: TRIG  Lab Results   Component Value Date    HDL 41 05/02/2021     No components found for: LDLCAL  No results found for: LABVLDL      Body mass index is 35.94 kg/m². BP Readings from Last 2 Encounters:   05/05/22 137/75   02/22/22 138/78           Pt admitted with followings belongings:  Dental Appliances: None  Vision - Corrective Lenses: None  Hearing Aid: None  Jewelry: None  Body Piercings Removed: N/A  Clothing: Footwear,Jacket/Coat,Pants,Shirt,Socks,Undergarments  Other Valuables: Wallet (multiple VISA/MC, 2 IDs)     Patient oriented to surroundings and program expectations and copy of patient rights given. Received admission packet:  yes. Consents reviewed, signed yes. Patient verbalize understanding:  yes. Patient education on precautions: yes. Patient arrives to [facility] unit x2 staff members from the [facility] AN AFFILIATE OF [facility]. Patient is cooperative with vitals and assessments at this time. Patient is admitted with suicidal thoughts to shoot self with a gun, which patient has access to at home. Patient is wanded and belongings are logged at this time. Patient is admitting to suicidal thoughts on admission.                     Mary Umana RN

## 2022-05-06 NOTE — CARE COORDINATION
BHI Biopsychosocial Assessment    Current Level of Psychosocial Functioning     Independent X  Dependent    Minimal Assist     Comments:    Psychosocial High Risk Factors (check all that apply)    Unable to obtain meds   Chronic illness/pain    Substance abuse X  Lack of Family Support   Financial stress   Isolation   Inadequate Community Resources  Suicide attempt(s)  Not taking medications   Victim of crime   Developmental Delay  Unable to manage personal needs    Age 72 or older   Homeless  No transportation   Readmission within 30 days  Unemployment  Traumatic Event X    Comments:   Psychiatric Advanced Directives: n/a    Family to Involve in Treatment: Patient declined. Sexual Orientation:  n/a    Patient Strengths: Patient has support from fiance/family; income, housing, insurance, sobriety from past heroin use    Patient Barriers: Patient has guilt w/relationship with father, unresolved grief, suicidal ideations, increase in anger & depressed thoughts. Opiate Education Provided:  No- patient no longer uses opiates. CMHC/mental health history:  [place]    Plan of Care   medication management, group/individual therapies, family meetings, psycho -education, treatment team meetings to assist with stabilization    Initial Discharge Plan:  Return home with family and continue treatment with [address] [place] [address]. Clinical Summary:    Mary Kate Duran is a 31 y/o male admitted to [place] [address] for suicidal ideations and symptoms of depression. Patient presented for assessment easy to engage in conversation and openly disclosed his plan to crash his car into a pole or a tree with no seatbelt on or hang himself. He stated she has become increasingly agitated lately more than usual and feels an increase in depressed thoughts for greater than one month. Patient shares that his fiance of six years whom he lives has also noticed these changes but has only recently verbalized them to him.   He states she brought him to the ED for help. Patient has been engaged in services with [address] [place] [address] for approximately two months now. He states he is still grieving the loss of his grandmother and has guilt over not spending more time with his father. Patient reports drinking alcohol more than usual and also has legal issues of probation related to a DUI w/firearm in the vehicle at the time of arrest.  He no longer has this firearm as it was taken by police at that time. Patient did not have any concerns for social work at this time.

## 2022-05-06 NOTE — PLAN OF CARE
Problem: Depression/Self Harm  Goal: Effect of psychiatric condition will be minimized and patient will be protected from self harm  Description: INTERVENTIONS:  1. Assess impact of patient's symptoms on level of functioning, self care needs and offer support as indicated  2. Assess patient/family knowledge of depression, impact on illness and need for teaching  3. Provide emotional support, presence and reassurance  4. Assess for possible suicidal thoughts or ideation. If patient expresses suicidal thoughts or statements do not leave alone, initiate Suicide Precautions, move to a room close to the nursing station and obtain sitter  5. Initiate consults as appropriate with Mental Health Professional, Spiritual Care, Psychosocial CNS, and consider a recommendation to the LIP for a Psychiatric Consultation  Outcome: Progressing     Problem: Behavior  Goal: Pt/Family maintain appropriate behavior and adhere to behavioral management agreement, if implemented  Description: INTERVENTIONS:  1. Assess patient/family's coping skills and  non-compliant behavior (including use of illegal substances)  2. Notify security of behavior or suspected illegal substances which indicate the need for search of the patient and/or belongings  3. Encourage verbalization of thoughts and concerns in a socially appropriate manner  4. Utilize positive, consistent limit setting strategies supporting safety of patient, staff and others  5. Encourage participation in the decision making process about the behavioral management agreement  6. Implement a Health Care Agreement if patient meets criteria  7. If a patient's behavior jeopardizes the safety of the patient, staff, or others refer to organization policy. If a visitor's behavior poses a threat to safety call refer to organization policy. 8. Initiate consult with , Psychosocial CNS, Spiritual Care as appropriate  Outcome: Progressing   Patient remains in behavioral control. Patient is thought blocking. Every 15 min checks maintained for pt safety.

## 2022-05-06 NOTE — PLAN OF CARE
[facility]  Initial Interdisciplinary Treatment Plan NO      Original treatment plan Date & Time: 5/6/2022   0920    Admission Type:  Admission Type: Voluntary    Reason for admission:   Reason for Admission: Patient having suicidal thoughts to shoot himself, depression has been constant for weeks    Estimated Length of Stay:  5-7days  Estimated Discharge Date: to be determined by physician    PATIENT STRENGTHS:  Patient Strengths:   Patient Strengths and Limitations:   Addictive Behavior: Addictive Behavior  In the Past 3 Months, Have You Felt or Has Someone Told You That You Have a Problem With  : None  Medical Problems:  Past Medical History:   Diagnosis Date    Asthma     Back pain     Drug-induced mood disorder ([facility] [address]     Hypertension     PVC (premature ventricular contraction)     Seasonal allergies     Torn earlobe 2014    bilateral gauged earlobe     Status EXAM:Mental Status and Behavioral Exam  Normal: No  Level of Assistance: Independent/Self  Facial Expression: Expressionless,Flat  Affect: Appropriate  Level of Consciousness: Alert  Frequency of Checks: 4 times per hour, close  Mood:Normal: No  Mood: Depressed  Motor Activity:Normal: Yes  Eye Contact: Good  Observed Behavior: Cooperative  Sexual Misconduct History: Past - no  Preception: [place] to person,[place] to time,[place] to place,[place] to situation  Attention:Normal: No  Thought Processes: Other (comment) (logical thought process)  Thought Content:Normal: Yes  Depression Symptoms: No problems reported or observed. Anxiety Symptoms: Generalized  Luann Symptoms: No problems reported or observed.   Hallucinations: None  Delusions: No  Memory:Normal: Yes  Insight and Judgment: Yes    EDUCATION:   Learner Progress Toward Treatment Goals: reviewed group plans and strategies for care    Method:group therapy, medication compliance, individualized assessments and care planning    Outcome: needs reinforcement    PATIENT GOALS: to be discussed with patient within 72 hours    PLAN/TREATMENT RECOMMENDATIONS:     continue group therapy , medications compliance, goal setting, individualized assessments and care, continue to monitor pt on unit      SHORT-TERM GOALS:   Time frame for Short-Term Goals: 5-7 days    LONG-TERM GOALS:  Time frame for Long-Term Goals: 6 months  Members Present in Team Meeting: See Signature Sheet    Felicia Tellez

## 2022-05-06 NOTE — ED PROVIDER NOTES
EMERGENCY DEPARTMENT ENCOUNTER    Pt Name: Nigel Hurd  MRN: [id number]  [place] 1995  Date of evaluation: 5/5/22  CHIEF COMPLAINT       Chief Complaint   Patient presents with    Mental Health Problem     HISTORY OF PRESENT ILLNESS   HPI  66-year-old male history of hypertension, depression presenting for evaluation with suicidal ideation. Patient states he has felt suicidal for some time but is worsened over the past several days to weeks. He has been having relationship problems with the mother of his children and he thinks this is made worse. He has a plan to either hang himself or shoot himself. He says he does have access to guns. No other physical symptoms. No actual attempted self-harm. REVIEW OF SYSTEMS     Review of Systems   Constitutional: Negative for chills and fatigue. HENT: Negative for facial swelling, nosebleeds, rhinorrhea and sore throat. Eyes: Negative for pain and redness. Respiratory: Negative for cough and shortness of breath. Cardiovascular: Negative for chest pain and leg swelling. Gastrointestinal: Negative for abdominal pain, diarrhea, nausea and vomiting. Genitourinary: Negative for flank pain and hematuria. Musculoskeletal: Negative for arthralgias and back pain. Skin: Negative for color change and rash. Neurological: Negative for dizziness, tremors, facial asymmetry, speech difficulty, weakness and numbness. Psychiatric/Behavioral: Positive for dysphoric mood and suicidal ideas. Negative for self-injury.      PASTMEDICAL HISTORY     Past Medical History:   Diagnosis Date    Asthma     Back pain     Drug-induced mood disorder (HCC)     Hypertension     PVC (premature ventricular contraction)     Seasonal allergies     Torn earlobe 2014    bilateral gauged earlobe     Past Problem List  Patient Active Problem List   Diagnosis Code    Torn earlobe S01.319A    Precordial pain R07.2    PVC's (premature ventricular contractions) I49.3    Essential hypertension I10    Unable to control anger R45.4    Cyclothymic disorder F34.0    Chest pain R07.9    PVC (premature ventricular contraction) I49.3    Seasonal allergies J30.2    Cardiomyopathy, alcoholic (HCC) O27.8    History of cardiac radiofrequency ablation (RFA) Z98.890    Depression with suicidal ideation F32. A, R45.851     SURGICAL HISTORY       Past Surgical History:   Procedure Laterality Date    EXTERNAL EAR SURGERY      VENTRICULAR ABLATION SURGERY  11/10/2017     CURRENT MEDICATIONS       Current Discharge Medication List      CONTINUE these medications which have NOT CHANGED    Details   PARoxetine (PAXIL) 40 MG tablet TAKE 1 TABLET BY MOUTH IN THE MORNING  Qty: 30 tablet, Refills: 3    Associated Diagnoses: Severe episode of recurrent major depressive disorder, without psychotic features (HCC)      traZODone (DESYREL) 50 MG tablet Take 1 tablet by mouth nightly as needed for Depression  Qty: 90 tablet, Refills: 1    Associated Diagnoses: Depression, unspecified depression type      chlorthalidone (HYGROTEN) 50 MG tablet TAKE 1 TABLET BY MOUTH EVERY DAY  Qty: 90 tablet, Refills: 2    Associated Diagnoses: Essential hypertension      amLODIPine (NORVASC) 10 MG tablet TAKE 1 TABLET BY MOUTH ONE TIME A DAY  Qty: 90 tablet, Refills: 2    Associated Diagnoses: Essential hypertension      hydrocortisone 2.5 % ointment Apply topically 2 times daily.   Qty: 28.35 g, Refills: 0    Associated Diagnoses: Dermatitis      vitamin D (ERGOCALCIFEROL) 1.25 MG (84001 UT) CAPS capsule Take 1 capsule by mouth once a week  Qty: 12 capsule, Refills: 1      sildenafil (VIAGRA) 50 MG tablet Take 1 tablet by mouth as needed for Erectile Dysfunction  Qty: 30 tablet, Refills: 0    Associated Diagnoses: Severe episode of recurrent major depressive disorder, without psychotic features (HCC)      acetaminophen (TYLENOL) 500 MG tablet Take 2 tablets by mouth 3 times daily as needed for Pain  Qty: 540 tablet, Refills: 1      montelukast (SINGULAIR) 10 MG tablet Take 1 tablet by mouth nightly  Qty: 90 tablet, Refills: 1      albuterol sulfate HFA (PROVENTIL HFA) 108 (90 BASE) MCG/ACT inhaler Inhale 2 puffs into the lungs 4 times daily Space out to every 6 hours as symptoms improve. Qty: 1 Inhaler, Refills: 0      cetirizine (ZYRTEC) 10 MG tablet Take 10 mg by mouth daily. ALLERGIES     is allergic to cat hair extract. FAMILY HISTORY     He indicated that his mother is alive. He indicated that his father is alive. He indicated that the status of his maternal grandfather is unknown. He indicated that the status of his paternal grandfather is unknown. SOCIAL HISTORY       Social History     Tobacco Use    Smoking status: Former Smoker     Packs/day: 1.00     Years: 1.00     Pack years: 1.00     Types: Cigarettes     Start date: 2013     Quit date: 2016     Years since quittin.8    Smokeless tobacco: Current User     Types: Snuff, Chew    Tobacco comment: Discussed dental care for oral cancer detection and prevention. Discussed risks for tobacco / nicotine. Vaping Use    Vaping Use: Never used   Substance Use Topics    Alcohol use: Yes     Alcohol/week: 0.0 standard drinks     Comment: occasional    Drug use: Not Currently     Types: Cocaine, Marijuana (Fortunato Rota), Opiates , Other-see comments     Comment: No longer uses. Hx of heroin use     PHYSICAL EXAM     INITIAL VITALS: /75   Pulse 85   Temp 98.4 °F (36.9 °C) (Oral)   Resp 16   Ht 6' (1.829 m)   Wt 265 lb (120.2 kg)   SpO2 96%   BMI 35.94 kg/m²    Physical Exam  Constitutional:       Appearance: Normal appearance. HENT:      Head: Normocephalic and atraumatic. Nose: Nose normal.   Eyes:      Extraocular Movements: Extraocular movements intact. Pupils: Pupils are equal, round, and reactive to light. Cardiovascular:      Rate and Rhythm: Normal rate and regular rhythm.    Pulmonary:      Effort: Pulmonary effort is normal. No respiratory distress. Breath sounds: Normal breath sounds. Abdominal:      General: Abdomen is flat. There is no distension. Palpations: Abdomen is soft. There is no mass. Musculoskeletal:         General: No swelling. Normal range of motion. Cervical back: Normal range of motion. No rigidity. Skin:     General: Skin is warm and dry. Neurological:      General: No focal deficit present. Mental Status: He is alert. Mental status is at baseline. Cranial Nerves: No cranial nerve deficit. Psychiatric:      Comments: Suicidal         MEDICAL DECISION MAKIN-year-old male presents for evaluation of suicidal ideation with plan to hang himself or shoot himself. Will check some basic labs for medical clearance and plan for admission. Labs are unremarkable patient is medically cleared for admission         CRITICAL CARE:       PROCEDURES:    Procedures    DIAGNOSTIC RESULTS   EKG:All EKG's are interpreted by the Emergency Department Physician who either signs or Co-signs this chart in the absence of a cardiologist.        RADIOLOGY:All plain film, CT, MRI, and formal ultrasound images (except ED bedside ultrasound) are read by the radiologist, see reports below, unless otherwisenoted in MDM or here. No orders to display     LABS: All lab results were reviewed by myself, and all abnormals are listed below.   Labs Reviewed   CBC WITH AUTO DIFFERENTIAL - Abnormal; Notable for the following components:       Result Value    WBC 11.7 (*)     MCV 79.7 (*)     Seg Neutrophils 71 (*)     Lymphocytes 18 (*)     Eosinophils % 5 (*)     Absolute Eos # 0.50 (*)     All other components within normal limits   COMPREHENSIVE METABOLIC PANEL W/ REFLEX TO MG FOR LOW K - Abnormal; Notable for the following components:    Glucose 158 (*)     Potassium 3.4 (*)     Total Bilirubin 0.29 (*)     All other components within normal limits   URINALYSIS WITH MICROSCOPIC - Abnormal; Notable for the following components:    Glucose, Ur TRACE (*)     All other components within normal limits   ETHANOL - Abnormal; Notable for the following components:    Ethanol 107 (*)     All other components within normal limits   URINE DRUG SCREEN   [facility]       EMERGENCY DEPARTMENTCOURSE:         Vitals:    Vitals:    05/05/22 2002 05/05/22 2130 05/05/22 2223   BP: 139/64  137/75   Pulse: 113 101 85   Resp: 16  16   Temp: 98.2 °F (36.8 °C)  98.4 °F (36.9 °C)   TempSrc: Oral  Oral   SpO2: 96%     Weight: 265 lb (120.2 kg)     Height: 6' (1.829 m)         The patient was given the following medications while in the emergency department:  Orders Placed This Encounter   Medications    acetaminophen (TYLENOL) tablet 650 mg    ibuprofen (ADVIL;MOTRIN) tablet 400 mg    hydrOXYzine (ATARAX) tablet 50 mg    DISCONTD: traZODone (DESYREL) tablet 50 mg    polyethylene glycol (GLYCOLAX) packet 17 g    aluminum & magnesium hydroxide-simethicone (MAALOX) 200-200-20 MG/5ML suspension 30 mL    nicotine polacrilex (NICORETTE) gum 2 mg    potassium bicarb-citric acid (EFFER-K) effervescent tablet 40 mEq    DISCONTD: traZODone (DESYREL) tablet 50 mg    traZODone (DESYREL) tablet 50 mg     CONSULTS:  IP CONSULT TO INTERNAL MEDICINE    FINAL IMPRESSION      1. Suicidal ideations          DISPOSITION/PLAN   DISPOSITION Decision To Admit 05/05/2022 09:36:01 PM      PATIENT REFERRED TO:  No follow-up provider specified. DISCHARGE MEDICATIONS:  Current Discharge Medication List        The care is provided during an unprecedented national emergency due to the novel coronavirus, COVID 19.   MD Shari Lobo MD  05/06/22 5817

## 2022-05-06 NOTE — ED NOTES
The following labs labeled with pt sticker and tubed to lab:     [] Blue     [x] Lavender   [] on ice  [x] Green/yellow  [] Green/black [] on ice  [] Yellow  [] Red  [] Pink      [] COVID-19 swab    [] Rapid  [] PCR  [] Peds Viral Panel     [x] Urine Sample  [] Pelvic Cultures  [] Blood Cultures        Stephanie Morrison RN  05/05/22 2024

## 2022-05-06 NOTE — PROGRESS NOTES
Behavioral Services  Medicare Certification Upon Admission    I certify that this patient's inpatient psychiatric hospital admission is medically necessary for:    [x] (1) Treatment which could reasonably be expected to improve this patient's condition,       [x] (2) Or for diagnostic study;     AND     [x](2) The inpatient psychiatric services are provided while the individual is under the care of a physician and are included in the individualized plan of care.     Estimated length of stay/service 3 to 5 days    Plan for post-hospital care outpatient [address]    Electronically signed by Calos Syed MD on 5/6/2022 at 12:40 PM

## 2022-05-07 LAB
ANION GAP SERPL CALCULATED.3IONS-SCNC: 10 MMOL/L (ref 9–17)
BUN BLDV-MCNC: 16 MG/DL (ref 6–20)
CALCIUM SERPL-MCNC: 9.8 MG/DL (ref 8.6–10.4)
CHLORIDE BLD-SCNC: 100 MMOL/L (ref 98–107)
CO2: 30 MMOL/L (ref 20–31)
CREAT SERPL-MCNC: 1.02 MG/DL (ref 0.7–1.2)
GFR AFRICAN AMERICAN: >60 ML/MIN
GFR NON-AFRICAN AMERICAN: >60 ML/MIN
GFR SERPL CREATININE-BSD FRML MDRD: ABNORMAL ML/MIN/{1.73_M2}
GLUCOSE BLD-MCNC: 118 MG/DL (ref 70–99)
POTASSIUM SERPL-SCNC: 4 MMOL/L (ref 3.7–5.3)
SODIUM BLD-SCNC: 140 MMOL/L (ref 135–144)

## 2022-05-07 PROCEDURE — 36415 COLL VENOUS BLD VENIPUNCTURE: CPT

## 2022-05-07 PROCEDURE — 80048 BASIC METABOLIC PNL TOTAL CA: CPT

## 2022-05-07 PROCEDURE — APPSS30 APP SPLIT SHARED TIME 16-30 MINUTES: Performed by: NURSE PRACTITIONER

## 2022-05-07 PROCEDURE — 6370000000 HC RX 637 (ALT 250 FOR IP): Performed by: PSYCHIATRY & NEUROLOGY

## 2022-05-07 PROCEDURE — 99232 SBSQ HOSP IP/OBS MODERATE 35: CPT | Performed by: INTERNAL MEDICINE

## 2022-05-07 PROCEDURE — 99232 SBSQ HOSP IP/OBS MODERATE 35: CPT | Performed by: PSYCHIATRY & NEUROLOGY

## 2022-05-07 PROCEDURE — 1240000000 HC EMOTIONAL WELLNESS R&B

## 2022-05-07 RX ORDER — NICOTINE 21 MG/24HR
1 PATCH, TRANSDERMAL 24 HOURS TRANSDERMAL DAILY
Status: DISCONTINUED | OUTPATIENT
Start: 2022-05-07 | End: 2022-05-07

## 2022-05-07 RX ADMIN — AMLODIPINE BESYLATE 5 MG: 5 TABLET ORAL at 08:12

## 2022-05-07 RX ADMIN — TRAZODONE HYDROCHLORIDE 50 MG: 50 TABLET ORAL at 21:25

## 2022-05-07 RX ADMIN — CHLORTHALIDONE 50 MG: 25 TABLET ORAL at 08:13

## 2022-05-07 RX ADMIN — HYDROXYZINE HYDROCHLORIDE 50 MG: 50 TABLET, FILM COATED ORAL at 21:25

## 2022-05-07 RX ADMIN — IBUPROFEN 400 MG: 400 TABLET ORAL at 20:10

## 2022-05-07 RX ADMIN — CETIRIZINE HYDROCHLORIDE 10 MG: 10 TABLET, FILM COATED ORAL at 08:13

## 2022-05-07 RX ADMIN — BUPROPION HYDROCHLORIDE 50 MG: 150 TABLET, FILM COATED, EXTENDED RELEASE ORAL at 08:13

## 2022-05-07 RX ADMIN — ARIPIPRAZOLE 2 MG: 5 TABLET ORAL at 08:13

## 2022-05-07 ASSESSMENT — PAIN SCALES - GENERAL: PAINLEVEL_OUTOF10: 5

## 2022-05-07 ASSESSMENT — PAIN DESCRIPTION - DESCRIPTORS: DESCRIPTORS: POUNDING

## 2022-05-07 ASSESSMENT — PAIN DESCRIPTION - LOCATION: LOCATION: HEAD

## 2022-05-07 NOTE — GROUP NOTE
Group Therapy Note    Date: 5/7/2022    Group Start Time: 0900  Group End Time: 1233  Group Topic: Community Meeting    SARAH DUNBARMYRNA Roberson        Group Therapy Note    Attendees: 8/16         Patient's Goal:  Talk with dr about discharge plans    Notes:      Status After Intervention:  Unchanged    Participation Level: Active Listener and Interactive    Participation Quality: Appropriate and Attentive      Speech:  normal      Thought Process/Content: Logical      Affective Functioning: Congruent      Mood: anxious      Level of consciousness:  Alert and Attentive      Response to Learning: Able to verbalize current knowledge/experience and Progressing to goal      Endings: None Reported    Modes of Intervention: Education, Support and Socialization      Discipline Responsible: Behavorial Health Tech      Signature:   Yfn Leggett

## 2022-05-07 NOTE — PLAN OF CARE
Problem: Behavior  Goal: Pt/Family maintain appropriate behavior and adhere to behavioral management agreement, if implemented  Description: INTERVENTIONS:  1. Assess patient/family's coping skills and  non-compliant behavior (including use of illegal substances)  2. Notify security of behavior or suspected illegal substances which indicate the need for search of the patient and/or belongings  3. Encourage verbalization of thoughts and concerns in a socially appropriate manner  4. Utilize positive, consistent limit setting strategies supporting safety of patient, staff and others  5. Encourage participation in the decision making process about the behavioral management agreement  6. Implement a Health Care Agreement if patient meets criteria  7. If a patient's behavior jeopardizes the safety of the patient, staff, or others refer to organization policy. If a visitor's behavior poses a threat to safety call refer to organization policy. 8. Initiate consult with , Psychosocial CNS, Spiritual Care as appropriate  5/6/2022 2233 by Angelika Montana LPN  Outcome: Progressing   Patient reports suicidal ideas at this time. Patient contracts for safety with staff. Patient encouraged to attend groups to learn coping skills for life stressors. Patient also encouraged to seek staff if thoughts become overwhelming. Problem: Depression/Self Harm  Goal: Effect of psychiatric condition will be minimized and patient will be protected from self harm  Description: INTERVENTIONS:  1. Assess impact of patient's symptoms on level of functioning, self care needs and offer support as indicated  2. Assess patient/family knowledge of depression, impact on illness and need for teaching  3. Provide emotional support, presence and reassurance  4. Assess for possible suicidal thoughts or ideation.  If patient expresses suicidal thoughts or statements do not leave alone, initiate Suicide Precautions, move to a room close to the nursing station and obtain sitter  5. Initiate consults as appropriate with Mental Health Professional, Spiritual Care, Psychosocial CNS, and consider a recommendation to the [facility] for a Psychiatric Consultation  5/6/2022 2233 by Susi Sweeney LPN  Outcome: Progressing   No sign of self harm noted. Will continue to monitor for safety every 15 minutes and provide support as needed.

## 2022-05-07 NOTE — PROGRESS NOTES
Daily Progress Note  5/7/2022    Patient Name: Miesha Sherman    CHIEF COMPLAINT: Suicidal ideation with plan to shoot self or drive into a pole         SUBJECTIVE:     Patient seen face-to-face for follow-up assessment. He is medication compliant. Home medication Paxil was titrated to 50 mg. We reviewed side effects and patient denies all. Patient is discharged focused and is minimizing his suicidal thoughts. He does confirm that he felt that he was close to harming himself and had to plans with access to means. Patient had endorsed a thought to shoot himself with his gun and refuses to remove the gun from his home. He does note that he has spoken with his fiancée and that they are working things out. This makes him feel more hopeful and forward-looking. Patient does not know how he would respond if the relationship were to be stressed in any way. He does not feel he is able to contract for safety off unit. Reviewed for any symptoms of alcohol withdrawal.  Patient denies any subjective symptoms. Vitals within appropriate range, no pupillary dilation or tremors observed. Patient does express improvement in his mood. Less isolative today and staff report he attended group programming. He is showing modest improvement, but has not yet demonstrated stability. He requires continued inpatient hospitalization for safety.     Compliant with scheduled medications: [x] Yes    [] No     Received emergency medications in past 24 hrs: [] Yes   [x] No    Appetite:  [x] Normal/Adequate/Unchanged  [] Increased  [] Decreased      Sleep:       [x] Normal/Adequate/Unchanged  [] Fair  [] Poor      Group Attendance on Unit:   [x] Yes  [] Selectively    [] No    Medication Side Effects: Denies         Mental Status Exam  Level of consciousness: Alert and awake   Appearance: Appropriate attire for setting, seated in chair, with fair  grooming and hygiene   Behavior/Motor: Approachable, no psychomotor abnormalities Attitude toward examiner: Cooperative, attentive, good eye contact   Speech: spontaneous, normal rate, normal volume and well articulated   Mood: Patient reports \"better\"  Affect: Blunted  Thought processes: linear and goal directed   Thought content: Denies homicidal ideation  Suicidal Ideation: Reports improving suicidal ideation, unable to contract for safety off unit. Delusions: Denies  Perceptual Disturbance: Denies. Does not appear to be responding to internal stimuli. Cognition: Oriented to self, location, time, and situation  Memory: intact  Insight & Judgement: poor     Data   height is 6' (1.829 m) and weight is 265 lb (120.2 kg). His temporal temperature is 98.1 °F (36.7 °C). His blood pressure is 135/76 and his pulse is 73. His respiration is 14 and oxygen saturation is 96%.    Labs:   Admission on 05/05/2022   Component Date Value Ref Range Status    WBC 05/05/2022 11.7* 3.5 - 11.0 k/uL Final    RBC 05/05/2022 5.89  4.5 - 5.9 m/uL Final    Hemoglobin 05/05/2022 16.1  13.5 - 17.5 g/dL Final    Hematocrit 05/05/2022 46.9  41 - 53 % Final    MCV 05/05/2022 79.7* 80 - 100 fL Final    MCH 05/05/2022 27.4  26 - 34 pg Final    MCHC 05/05/2022 34.4  31 - 37 g/dL Final    RDW 05/05/2022 13.8  11.5 - 14.9 % Final    Platelets 16/06/9548 227  150 - 450 k/uL Final    MPV 05/05/2022 9.4  6.0 - 12.0 fL Final    Seg Neutrophils 05/05/2022 71* 36 - 66 % Final    Lymphocytes 05/05/2022 18* 24 - 44 % Final    Monocytes 05/05/2022 5  1 - 7 % Final    Eosinophils % 05/05/2022 5* 0 - 4 % Final    Basophils 05/05/2022 1  0 - 2 % Final    Segs Absolute 05/05/2022 8.30  1.3 - 9.1 k/uL Final    Absolute Lymph # 05/05/2022 2.10  1.0 - 4.8 k/uL Final    Absolute Mono # 05/05/2022 0.60  0.1 - 1.3 k/uL Final    Absolute Eos # 05/05/2022 0.50* 0.0 - 0.4 k/uL Final    Basophils Absolute 05/05/2022 0.10  0.0 - 0.2 k/uL Final    Glucose 05/05/2022 158* 70 - 99 mg/dL Final    BUN 05/05/2022 12  6 - 20 mg/dL Final    CREATININE 05/05/2022 0.92  0.70 - 1.20 mg/dL Final    Calcium 05/05/2022 10.0  8.6 - 10.4 mg/dL Final    Sodium 05/05/2022 140  135 - 144 mmol/L Final    Potassium 05/05/2022 3.4* 3.7 - 5.3 mmol/L Final    Chloride 05/05/2022 103  98 - 107 mmol/L Final    CO2 05/05/2022 21  20 - 31 mmol/L Final    Anion Gap 05/05/2022 16  9 - 17 mmol/L Final    Alkaline Phosphatase 05/05/2022 63  40 - 129 U/L Final    ALT 05/05/2022 38  5 - 41 U/L Final    AST 05/05/2022 23  <40 U/L Final    Total Bilirubin 05/05/2022 0.29* 0.3 - 1.2 mg/dL Final    Total Protein 05/05/2022 7.7  6.4 - 8.3 g/dL Final    Albumin 05/05/2022 4.7  3.5 - 5.2 g/dL Final    GFR Non- 05/05/2022 >60  >60 mL/min Final    GFR  05/05/2022 >60  >60 mL/min Final    GFR Comment 05/05/2022        Final    Comment: Average GFR for 20-28 years old:   116 mL/min/1.73sq m  Chronic Kidney Disease:   <60 mL/min/1.73sq m  Kidney failure:   <15 mL/min/1.73sq m              eGFR calculated using average adult body mass.  Additional eGFR calculator available at:        [email or web address]            Color, UA 05/05/2022 Yellow  Yellow Final    Turbidity UA 05/05/2022 Clear  Clear Final    Glucose, Ur 05/05/2022 TRACE* NEGATIVE Final    Bilirubin Urine 05/05/2022 NEGATIVE  NEGATIVE Final    Ketones, Urine 05/05/2022 NEGATIVE  NEGATIVE Final    Specific Gravity, UA 05/05/2022 1.006  1.000 - 1.030 Final    Urine Hgb 05/05/2022 NEGATIVE  NEGATIVE Final    pH, UA 05/05/2022 5.0  5.0 - 8.0 Final    Protein, UA 05/05/2022 NEGATIVE  NEGATIVE Final    Urobilinogen, Urine 05/05/2022 Normal  Normal Final    Nitrite, Urine 05/05/2022 NEGATIVE  NEGATIVE Final    Leukocyte Esterase, Urine 05/05/2022 NEGATIVE  NEGATIVE Final    WBC, UA 05/05/2022 0 TO 2  /HPF Final    RBC, UA 05/05/2022 0 TO 2  /HPF Final    Casts UA 05/05/2022 0 TO 2  /LPF Final    Epithelial Cells UA 05/05/2022 0 TO 2  /HPF Final    Bacteria, UA 05/05/2022 None  None Final    Ethanol 05/05/2022 107* <10 mg/dL Final    Ethanol percent 05/05/2022 0.107  % Final    Amphetamine Screen, Ur 05/05/2022 NEGATIVE  NEGATIVE Final    Comment:       (Positive cutoff 1000 ng/mL)                  Barbiturate Screen, Ur 05/05/2022 NEGATIVE  NEGATIVE Final    Comment:       (Positive cutoff 200 ng/mL)                  Benzodiazepine Screen, Urine 05/05/2022 NEGATIVE  NEGATIVE Final    Comment:       (Positive cutoff 200 ng/mL)                  Cocaine Metabolite, Urine 05/05/2022 NEGATIVE  NEGATIVE Final    Comment:       (Positive cutoff 300 ng/mL)                  Methadone Screen, Urine 05/05/2022 NEGATIVE  NEGATIVE Final    Comment:       (Positive cutoff 300 ng/mL)                  Opiates, Urine 05/05/2022 NEGATIVE  NEGATIVE Final    Comment:       (Positive cutoff 300 ng/mL)                  Phencyclidine, Urine 05/05/2022 NEGATIVE  NEGATIVE Final    Comment:       (Positive cutoff 25 ng/mL)                  Cannabinoid Scrn, Ur 05/05/2022 NEGATIVE  NEGATIVE Final    Comment:       (Positive cutoff 50 ng/mL)                  Oxycodone Screen, Ur 05/05/2022 NEGATIVE  NEGATIVE Final    Comment:       (Positive cutoff 100 ng/mL)                  Test Information 05/05/2022 Assay provides medical screening only. The absence of expected drug(s) and/or metabolite(s) may indicate diluted or adulterated urine, limitations of testing or timing of collection. Final    Comment: Testing for legal purposes should be confirmed by another method. To request confirmation   of test result, please call the lab within 7 days of sample submission.       Magnesium 05/05/2022 2.0  1.6 - 2.6 mg/dL Final    Glucose 05/07/2022 118* 70 - 99 mg/dL Final    BUN 05/07/2022 16  6 - 20 mg/dL Final    CREATININE 05/07/2022 1.02  0.70 - 1.20 mg/dL Final    Calcium 05/07/2022 9.8  8.6 - 10.4 mg/dL Final    Sodium 05/07/2022 140  135 - 144 mmol/L Final    Potassium 05/07/2022 4.0  3.7 - 5.3 mmol/L Final    Chloride 05/07/2022 100  98 - 107 mmol/L Final    CO2 05/07/2022 30  20 - 31 mmol/L Final    Anion Gap 05/07/2022 10  9 - 17 mmol/L Final    GFR Non- 05/07/2022 >60  >60 mL/min Final    GFR  05/07/2022 >60  >60 mL/min Final    GFR Comment 05/07/2022        Final    Comment: Average GFR for 2129 years old:   116 mL/min/1.73sq m  Chronic Kidney Disease:   <60 mL/min/1.73sq m  Kidney failure:   <15 mL/min/1.73sq m              eGFR calculated using average adult body mass. Additional eGFR calculator available at:        [email or web address]                 Reviewed patient's current plan of care and vital signs with nursing staff.     Labs reviewed: [x] Yes  Last EKG in EMR reviewed: [x] Yes    Medications  Current Facility-Administered Medications: nicotine polacrilex (NICORETTE) gum 2 mg, 2 mg, Oral, Q1H PRN  amLODIPine (NORVASC) tablet 5 mg, 5 mg, Oral, Daily  ARIPiprazole (ABILIFY) tablet 2 mg, 2 mg, Oral, Daily  cetirizine (ZYRTEC) tablet 10 mg, 10 mg, Oral, Daily  chlorthalidone (HYGROTON) tablet 50 mg, 50 mg, Oral, Daily  PARoxetine (PAXIL) tablet 50 mg, 50 mg, Oral, Daily  acetaminophen (TYLENOL) tablet 650 mg, 650 mg, Oral, Q6H PRN  ibuprofen (ADVIL;MOTRIN) tablet 400 mg, 400 mg, Oral, Q6H PRN  hydrOXYzine (ATARAX) tablet 50 mg, 50 mg, Oral, TID PRN  polyethylene glycol (GLYCOLAX) packet 17 g, 17 g, Oral, Daily PRN  aluminum & magnesium hydroxide-simethicone (MAALOX) 200-200-20 MG/5ML suspension 30 mL, 30 mL, Oral, Q6H PRN  potassium bicarb-citric acid (EFFER-K) effervescent tablet 40 mEq, 40 mEq, Oral, Once  traZODone (DESYREL) tablet 50 mg, 50 mg, Oral, Nightly PRN    ASSESSMENT  Major depressive disorder, recurrent, severe with psychotic features (HCC)    Alcohol use disorder moderate  Rule out cluster B personality disorder  Rule out bipolar disorder  Rule out obsessive-compulsive disorder            HANDOFF  Patient symptoms are:  Modestly improving  Medications as determined by attending physician  Encourage participation in groups and milieu. Probable discharge is to be determined by physician    Electronically signed by DAVID Denson CNP on 5/7/2022 at 5:04 PM    **This report has been created using voice recognition software. It may contain minor errors which are inherent in voice recognition technology. **                                         Psychiatry Attending Attestation     I independently saw and evaluated the patient. I reviewed the Advance Practice Provider's documentation above. Any additional comments or changes to the Advance Practice Provider's documentation are stated below otherwise agree with assessment. Patient reports that his mood is little better today. Somewhat focused on discharge however he is agreeable to stay back for further stabilization. Notes that suicidal thoughts still cross his mind. Reports that increasing the dose of Paxil has been helpful and denies any side effect from the medication. Has poor attention and concentration. Has been actively attending groups and participating in his care plan. PLAN  Patient s symptoms are improving  We will continue to titrate Abilify  Attempt to develop insight  Psycho-education conducted. Supportive Therapy conducted.   Probable discharge is Monday  Follow-up TBD    Electronically signed by Adrienne Otoole MD on 5/7/22 at 7:07 PM EDT

## 2022-05-07 NOTE — PROGRESS NOTES
[facility]    CONSULTATION / HISTORY AND PHYSICAL EXAMINATION            Date:   5/7/2022  Patient name:  Sheila Malik  Date of admission:  5/5/2022  8:07 PM  MRN:   [id number]  Account:  [de-identified]  YOB: 1995  PCP:    DAVID Carpenter CNP  Room:   0207/0207-01  Code Status:    Full Code    Physician Requesting Consult: Adrienne Otoole, *    Reason for Consult:  medical management    Chief Complaint:     Chief Complaint   Patient presents with    Mental Health Problem       History Obtained From:     Patient medical record nursing staff    History of Present Illness:   Patient with past medical history multiple medical problems include hypertension, depression, history of alcohol abuse. Patient admitted to [facility] floor with major depression, suicidal ideation  He never diagnosed with chronic medical condition like diabetes, coronary artery disease  Feeling better  He has rash in both forearms, he told me he itches a lot    Past Medical History:     Past Medical History:   Diagnosis Date    Asthma     Back pain     Drug-induced mood disorder ([address]     Hypertension     PVC (premature ventricular contraction)     Seasonal allergies     Torn earlobe 2014    bilateral gauged earlobe        Past Surgical History:     Past Surgical History:   Procedure Laterality Date    EXTERNAL EAR SURGERY      VENTRICULAR ABLATION SURGERY  11/10/2017        Medications Prior to Admission:     Prior to Admission medications    Medication Sig Start Date End Date Taking?  Authorizing Provider   ARIPiprazole (ABILIFY) 2 MG tablet Take 2 mg by mouth daily   Yes Historical Provider, MD   ibuprofen (ADVIL;MOTRIN) 800 MG tablet Take 800 mg by mouth every 6 hours as needed for Pain   Yes Historical Provider, MD   PARoxetine (PAXIL) 40 MG tablet TAKE 1 TABLET BY MOUTH IN THE MORNING 4/11/22   DAVID Witt CNP   traZODone (DESYREL) 50 MG tablet Take 1 tablet by mouth nightly as needed for Depression 2/22/22   [place] DAVID Leggett CNP   chlorthalidone (HYGROTEN) 50 MG tablet TAKE 1 TABLET BY MOUTH EVERY DAY 1/17/22   [place] DAVID Leggett - CNP   amLODIPine (NORVASC) 10 MG tablet TAKE 1 TABLET BY MOUTH ONE TIME A DAY 1/17/22   Hoa LeggettDAVID - CNP   cetirizine (ZYRTEC) 10 MG tablet Take 10 mg by mouth daily. Historical Provider, MD        Allergies:     Cat hair extract    Social History:     Tobacco:    reports that he quit smoking about 5 years ago. His smoking use included cigarettes. He started smoking about 8 years ago. He has a 1.00 pack-year smoking history. His smokeless tobacco use includes snuff and chew. Alcohol:      reports current alcohol use. Drug Use:  reports previous drug use. Drugs: Cocaine, Marijuana ([place]), Opiates , and Other-see comments. Family History:     Family History   Problem Relation Age of Onset    Heart Disease Father     High Blood Pressure Father     Diabetes Father     Alcohol Abuse Father     Asthma Mother     Heart Disease Mother     Coronary Art Dis Maternal Grandfather     Coronary Art Dis Paternal Grandfather        Review of Systems:     Positive and Negative as described in HPI. CONSTITUTIONAL:  negative for fevers, chills, sweats, fatigue, weight loss  HEENT:  negative for vision, hearing changes, runny nose, throat pain  RESPIRATORY:  negative for shortness of breath, cough, congestion, wheezing. CARDIOVASCULAR:  negative for chest pain, palpitations.   GASTROINTESTINAL:  negative for nausea, vomiting, diarrhea, constipation, change in bowel habits, abdominal pain   GENITOURINARY:  negative for difficulty of urination, burning with urination, frequency   INTEGUMENT:  negative for rash, skin lesions, easy bruising   HEMATOLOGIC/LYMPHATIC:  negative for swelling/edema   ALLERGIC/IMMUNOLOGIC:  negative for urticaria , itching  ENDOCRINE:  negative increase in drinking, increase in urination, hot or cold intolerance  MUSCULOSKELETAL:  negative joint pains, muscle aches, swelling of joints  NEUROLOGICAL:  negative for headaches, dizziness, lightheadedness, numbness, pain, tingling extremities  BEHAVIOR/PSYCH: Depressed    Physical Exam:     /76   Pulse 73   Temp 98.1 °F (36.7 °C) (Temporal)   Resp 14   Ht 6' (1.829 m)   Wt 265 lb (120.2 kg)   SpO2 96%   BMI 35.94 kg/m²   Temp (24hrs), Av.3 °F (36.8 °C), Min:98.1 °F (36.7 °C), Max:98.5 °F (36.9 °C)    No results for input(s): POCGLU in the last 72 hours. No intake or output data in the 24 hours ending 22 1653    General Appearance:  alert, well appearing, and in no acute distress, central obesity present  Mental status: oriented to person, place, and time with normal affect  Head:  normocephalic, atraumatic. Eye: no icterus, redness, pupils equal and reactive, extraocular eye movements intact, conjunctiva clear  Ear: normal external ear, no discharge, hearing intact  Nose:  no drainage noted  Mouth: mucous membranes moist  Neck: supple, no carotid bruits, thyroid not palpable  Lungs: Bilateral equal air entry, clear to ausculation, no wheezing, rales or rhonchi, normal effort  Cardiovascular: normal rate, regular rhythm, no murmur, gallop, rub. Abdomen: Soft, nontender, nondistended, normal bowel sounds, no hepatomegaly or splenomegaly  Neurologic: There are no new focal motor or sensory deficits, normal muscle tone and bulk, no abnormal sensation, normal speech, cranial nerves II through XII grossly intact  Skin: No gross lesions, rashes, bruising or bleeding on exposed skin area  Extremities: Rash in both forearms  Psych:      Investigations:      Laboratory Testing:  Recent Results (from the past 24 hour(s))   Basic Metabolic Panel    Collection Time: 22  8:00 AM   Result Value Ref Range    Glucose 118 (H) 70 - 99 mg/dL    BUN 16 6 - 20 mg/dL    CREATININE 1.02 0.70 - 1.20 mg/dL    Calcium 9.8 8.6 - 10.4 mg/dL Sodium 140 135 - 144 mmol/L    Potassium 4.0 3.7 - 5.3 mmol/L    Chloride 100 98 - 107 mmol/L    CO2 30 20 - 31 mmol/L    Anion Gap 10 9 - 17 mmol/L    GFR Non-African American >60 >60 mL/min    GFR African American >60 >60 mL/min    GFR Comment                 Consultations:   IP CONSULT TO INTERNAL MEDICINE  Assessment :      Primary Problem  Major depressive disorder, recurrent, severe with psychotic features [facility])    Active Hospital Problems    Diagnosis Date Noted    Major depressive disorder, recurrent, severe with psychotic features ([facility] [F33.3] 05/06/2022     Priority: Medium    Alcohol use disorder, moderate, in controlled environment ([facility] [F10.20] 05/06/2022     Priority: Medium    Essential hypertension [I10] 11/01/2018     Principal Problem:    Major depressive disorder, recurrent, severe with psychotic features ([facility]  Active Problems:    Alcohol use disorder, moderate, in controlled environment [facility])    Essential hypertension  Resolved Problems:    * No resolved hospital problems. *      Plan:     1. Hypertension, restarted home dose of chlorthalidone and Norvasc, controlled  2. Hypokalemia, patient was given 40 mg of potassium pills yesterday, will recheck BMP tomorrow  3. Major depression, managed by psychiatrist  4. Patient has rash in both forearms he is itching a lot, on hydroxyzine as needed    5/7   Patient blood pressure, controlled  Repeat BMP, potassium improved  We will sign off, please call with questions    Sherrie Mccord MD  5/7/2022  4:53 PM    Copy sent to Dr. Corry Keane, APRN - CNP    Please note that this chart was generated using voice recognition Dragon dictation software. Although every effort was made to ensure the accuracy of this automated transcription, some errors in transcription may have occurred.

## 2022-05-07 NOTE — PLAN OF CARE
Problem: Behavior  Goal: Pt/Family maintain appropriate behavior and adhere to behavioral management agreement, if implemented  Description: INTERVENTIONS:  1. Assess patient/family's coping skills and  non-compliant behavior (including use of illegal substances)  2. Notify security of behavior or suspected illegal substances which indicate the need for search of the patient and/or belongings  3. Encourage verbalization of thoughts and concerns in a socially appropriate manner  4. Utilize positive, consistent limit setting strategies supporting safety of patient, staff and others  5. Encourage participation in the decision making process about the behavioral management agreement  6. Implement a Health Care Agreement if patient meets criteria  7. If a patient's behavior jeopardizes the safety of the patient, staff, or others refer to organization policy. If a visitor's behavior poses a threat to safety call refer to organization policy. 8. Initiate consult with , Psychosocial CNS, Spiritual Care as appropriate  5/7/2022 1639 by Zo Gray RN  Outcome: Progressing     Problem: Depression/Self Harm  Goal: Effect of psychiatric condition will be minimized and patient will be protected from self harm  Description: INTERVENTIONS:  1. Assess impact of patient's symptoms on level of functioning, self care needs and offer support as indicated  2. Assess patient/family knowledge of depression, impact on illness and need for teaching  3. Provide emotional support, presence and reassurance  4. Assess for possible suicidal thoughts or ideation. If patient expresses suicidal thoughts or statements do not leave alone, initiate Suicide Precautions, move to a room close to the nursing station and obtain sitter  5.  Initiate consults as appropriate with Mental Health Professional, Spiritual Care, Psychosocial CNS, and consider a recommendation to the LIP for a Psychiatric Consultation  5/7/2022 1639 by Sj Gracia Charo Ho, RN  Outcome: Progressing     Patient out and social with peers. Patient denies all except depression and anxiety at this time. Patient is brightened on approach and cooperative with shift assessment and compliant with scheduled medications. Patient attending groups. Q15 minute and random safety checks maintained.

## 2022-05-07 NOTE — PLAN OF CARE
5 [facility]  Day 3 Interdisciplinary Treatment Plan NOTE    Review Date & Time: 5/7/2022                         939am    Admission Type:   Admission Type: Voluntary    Reason for admission:  Reason for Admission: Patient having suicidal thoughts to shoot himself, depression has been constant for weeks  Estimated Length of Stay: 5-7 days  Estimated Discharge Date Update: to be determined by physician    PATIENT STRENGTHS:  Patient Strengths    Patient Strengths and Limitations:Limitations: External locus of control,Tendency to isolate self,Difficulty problem solving/relies on others to help solve problems,Multiple barriers to leisure interests,Lacks leisure interests  Addictive Behavior:Addictive Behavior  In the Past 3 Months, Have You Felt or Has Someone Told You That You Have a Problem With  : None  Medical Problems:  Past Medical History:   Diagnosis Date    Asthma     Back pain     Drug-induced mood disorder ([address]     Hypertension     PVC (premature ventricular contraction)     Seasonal allergies     Torn earlobe 2014    bilateral gauged earlobe       Risk:  Fall Risk   Jax Scale Jax Scale Score: 22  BVC    Change in scores no Changes to plan of Care no    Status EXAM:   Mental Status and Behavioral Exam  Normal: No  Level of Assistance: Independent/Self  Facial Expression: Flat  Affect: Appropriate  Level of Consciousness: Alert  Frequency of Checks: 4 times per hour, close  Mood:Normal: No  Mood: Depressed,Anxious  Motor Activity:Normal: Yes  Motor Activity: Increased  Eye Contact: Fair  Observed Behavior: Cooperative,Preoccupied  Sexual Misconduct History: Past - no  Preception: [place] to person,[place] to time,[place] to place,[place] to situation  Attention:Normal: No  Attention: Unable to concentrate  Thought Processes: Circumstantial  Thought Content:Normal: Yes  Depression Symptoms: Impaired concentration  Anxiety Symptoms: Generalized  Luann Symptoms: No problems reported or observed. Hallucinations: None  Delusions: No  Memory:Normal: Yes  Insight and Judgment: Yes    Daily Assessment Last Entry:                Daily Nutrition (WDL): Within Defined Limits  Level of Assistance: Independent/Self    Patient Monitoring:  Frequency of Checks: 4 times per hour, close    Psychiatric Symptoms:   Depression Symptoms  Depression Symptoms: Impaired concentration  Anxiety Symptoms  Anxiety Symptoms: Generalized  Luann Symptoms  Luann Symptoms: No problems reported or observed. Suicide Risk CSSR-S:  1) Within the past month, have you wished you were dead or wished you could go to sleep and not wake up? : Yes  2) Have you actually had any thoughts of killing yourself? : No  3) Have you been thinking about how you might kill yourself? : No  5) Have you started to work out or worked out the details of how to kill yourself?  Do you intend to carry out this plan? : No  6) Have you ever done anything, started to do anything, or prepared to do anything to end your life?: No  Change in Result          no                  Change in Plan of care             no      EDUCATION:   EDUCATION:   Learner Progress Toward Treatment Goals: Reviewed results and recommendations of this team, Reviewed group plan and strategies, Reviewed signs, symptoms and risk of self harm and violent behavior, Reviewed goals and plan of care    Method:small group, individual verbal education    Outcome:verbalized by patient, but needs reinforcement to obtain goals    PATIENT GOALS:  Short term: \"get stable on new meds, improve/take a step back from negative thoughts\"  Long term: \"follow up with Psychiatrist and therapist, work on controlling my anger\"    PLAN/TREATMENT RECOMMENDATIONS UPDATE: continue with group therapies, increased socialization, continue planning for after discharge goals, continue with medication compliance    SHORT-TERM GOALS UPDATE:   Time frame for Short-Term Goals: 5-7 days    LONG-TERM GOALS UPDATE: Time frame for Long-Term Goals: 6 months  Members Present in Team Meeting: See Signature Sheet    Annabelle Young

## 2022-05-08 PROCEDURE — 1240000000 HC EMOTIONAL WELLNESS R&B

## 2022-05-08 PROCEDURE — APPSS30 APP SPLIT SHARED TIME 16-30 MINUTES: Performed by: NURSE PRACTITIONER

## 2022-05-08 PROCEDURE — 6370000000 HC RX 637 (ALT 250 FOR IP): Performed by: PSYCHIATRY & NEUROLOGY

## 2022-05-08 PROCEDURE — 99232 SBSQ HOSP IP/OBS MODERATE 35: CPT | Performed by: PSYCHIATRY & NEUROLOGY

## 2022-05-08 RX ADMIN — BUPROPION HYDROCHLORIDE 50 MG: 150 TABLET, FILM COATED, EXTENDED RELEASE ORAL at 08:42

## 2022-05-08 RX ADMIN — ARIPIPRAZOLE 2 MG: 5 TABLET ORAL at 08:41

## 2022-05-08 RX ADMIN — NICOTINE POLACRILEX 2 MG: 2 GUM, CHEWING BUCCAL at 12:02

## 2022-05-08 RX ADMIN — TRAZODONE HYDROCHLORIDE 50 MG: 50 TABLET ORAL at 21:48

## 2022-05-08 RX ADMIN — CETIRIZINE HYDROCHLORIDE 10 MG: 10 TABLET, FILM COATED ORAL at 08:41

## 2022-05-08 RX ADMIN — AMLODIPINE BESYLATE 5 MG: 5 TABLET ORAL at 08:42

## 2022-05-08 RX ADMIN — HYDROXYZINE HYDROCHLORIDE 50 MG: 50 TABLET, FILM COATED ORAL at 21:48

## 2022-05-08 RX ADMIN — CHLORTHALIDONE 50 MG: 25 TABLET ORAL at 10:40

## 2022-05-08 NOTE — PLAN OF CARE
Problem: Behavior  Goal: Pt/Family maintain appropriate behavior and adhere to behavioral management agreement, if implemented  Description: INTERVENTIONS:  1. Assess patient/family's coping skills and  non-compliant behavior (including use of illegal substances)  2. Notify security of behavior or suspected illegal substances which indicate the need for search of the patient and/or belongings  3. Encourage verbalization of thoughts and concerns in a socially appropriate manner  4. Utilize positive, consistent limit setting strategies supporting safety of patient, staff and others  5. Encourage participation in the decision making process about the behavioral management agreement  6. Implement a Health Care Agreement if patient meets criteria  7. If a patient's behavior jeopardizes the safety of the patient, staff, or others refer to organization policy. If a visitor's behavior poses a threat to safety call refer to organization policy. 8. Initiate consult with , Psychosocial CNS, Spiritual Care as appropriate  5/8/2022 1151 by Hever Duran  Outcome: Progressing  Flowsheets (Taken 5/7/2022 2229 by Orion Joseph RN)  Patient/family maintains appropriate behavior and adheres to behavioral management agreement, if implemented:   Assess patient/familys coping skills and  non-compliant behavior (including use of illegal substances)   Encourage verbalization of thoughts and concerns in a socially appropriate manner  Pt. Has been appropriate on unit. Relates to a lot of anxiety. Says this is worse when he is at home with his 3 daughters. Does state he smokes marijuana to [place]" at home and believes he needs better coping skills. Pt is medication compliant. Problem: Depression/Self Harm  Goal: Effect of psychiatric condition will be minimized and patient will be protected from self harm  Description: INTERVENTIONS:  1.  Assess impact of patient's symptoms on level of functioning, self care needs and offer support as indicated  2. Assess patient/family knowledge of depression, impact on illness and need for teaching  3. Provide emotional support, presence and reassurance  4. Assess for possible suicidal thoughts or ideation. If patient expresses suicidal thoughts or statements do not leave alone, initiate Suicide Precautions, move to a room close to the nursing station and obtain sitter  5. Initiate consults as appropriate with Mental Health Professional, Spiritual Care, Psychosocial CNS, and consider a recommendation to the LIP for a Psychiatric Consultation  5/8/2022 1151 by Lenin Getting  Outcome: Progressing  Flowsheets (Taken 5/7/2022 2229 by Faviola Peña RN)  Effect of psychiatric condition will be minimized and patient will be protected from self harm: Assess impact of patients symptoms on level of functioning, self care needs and offer support as indicated  Pt denies hallucinations. Denies suicidal thoughts. Attends groups. Appropriate self care. Pt. Remains safe on the unit. Q 15 minute checks for safety maintained.

## 2022-05-08 NOTE — GROUP NOTE
Group Therapy Note    Date: 5/8/2022    Group Start Time: 5569  Group End Time: 1115  Group Topic: Psychoeducation    [facility] CORINA Rojas, LSW        Group Therapy Note    Attendees: 12/17         Patient's Goal:  Increase interpersonal relationship skills    Notes:  Patient was an active participant in group discussion    Status After Intervention:  Improved    Participation Level:  Active Listener and Interactive    Participation Quality: Appropriate, Attentive, Sharing and Supportive      Speech:  normal      Thought Process/Content: Logical  Linear      Affective Functioning: Congruent      Mood: euthymic      Level of consciousness:  Alert, Oriented x4 and Attentive      Response to Learning: Able to verbalize current knowledge/experience, Able to verbalize/acknowledge new learning, Able to retain information, Capable of insight, Able to change behavior and Progressing to goal      Endings: None Reported    Modes of Intervention: Support and Socialization      Discipline Responsible: /Counselor      Signature:  CORINA Duong, LSW

## 2022-05-08 NOTE — PLAN OF CARE
Problem: Behavior  Goal: Pt/Family maintain appropriate behavior and adhere to behavioral management agreement, if implemented  Description: INTERVENTIONS:  1. Assess patient/family's coping skills and  non-compliant behavior (including use of illegal substances)  2. Notify security of behavior or suspected illegal substances which indicate the need for search of the patient and/or belongings  3. Encourage verbalization of thoughts and concerns in a socially appropriate manner  4. Utilize positive, consistent limit setting strategies supporting safety of patient, staff and others  5. Encourage participation in the decision making process about the behavioral management agreement  6. Implement a Health Care Agreement if patient meets criteria  7. If a patient's behavior jeopardizes the safety of the patient, staff, or others refer to organization policy. If a visitor's behavior poses a threat to safety call refer to organization policy. 8. Initiate consult with , Psychosocial CNS, Spiritual Care as appropriate  5/8/2022 1937 by Gerald Blizzard, RN  Outcome: Progressing   Controlled/cooperative. Minimally social with peers & guarded but polite when I approach. Flat/sad affect. Problem: Depression/Self Harm  Goal: Effect of psychiatric condition will be minimized and patient will be protected from self harm  Description: INTERVENTIONS:  1. Assess impact of patient's symptoms on level of functioning, self care needs and offer support as indicated  2. Assess patient/family knowledge of depression, impact on illness and need for teaching  3. Provide emotional support, presence and reassurance  4. Assess for possible suicidal thoughts or ideation. If patient expresses suicidal thoughts or statements do not leave alone, initiate Suicide Precautions, move to a room close to the nursing station and obtain sitter  5.  Initiate consults as appropriate with Mental Health Professional, Spiritual Care, Psychosocial CNS, and consider a recommendation to the LIP for a Psychiatric Consultation  5/8/2022 1937 by Saeed Alarcon RN  Outcome: Progressing   Denies wanting to harm self & behaviors reflect the same.

## 2022-05-08 NOTE — GROUP NOTE
HS Goal Group      Date: May 7, 2022   Group Start Time: 2000   Group End Time: 2025   [facility]   Attendees: 10/17     Group Topic: HS Goal Group   Notes: Patient participated in HS goal group.      Status After Intervention: Improved   Participation Level:  Active Listener and Interactive   Participation Quality: Appropriate, attentive and supportive   Speech: Normal   Thought Process/Content: Logical and linear   Affective Functioning: Congruent   Mood: WDL   Level of consciousness: Oriented x4   Response to Learning: Progressing to goal; able to verbalize current knowledge/experience, acknowledge new learning, retain information and change behavior    Endings: None reported   Modes of Intervention: Education and exploration      Discipline Responsible: Behavioral Health Tech   Signature: Jose Antonio Melo

## 2022-05-08 NOTE — PROGRESS NOTES
Daily Progress Note  5/8/2022    Patient Name: Kirti Jorgensen    CHIEF COMPLAINT: Suicidal ideation with plan to shoot self or drive into a pole         SUBJECTIVE:     Patient seen face-to-face for follow-up assessment. He is medication compliant. Patient's home medication Paxil has been titrated to 50 mg p.o. daily. He denies any side effects. He reports improvement in mood since admission to unit. Endorses reduction in suicidal ideation. He continues to verbalize feeling both depressed and anxious throughout the day and does express feelings of remorse and guilt regarding his outburst that led to admission. Patient states that he slept well overnight and denies any problems with appetite. He has been attending group programming and is actively working on coping skills to help with his depression and anxiety. States that he has been talking with his fiancée and things are going better in their communication. Starting to feel safer from himself, but not yet able to contract for safety off unit. He denies any symptoms of alcohol withdrawal.  Vitals within appropriate range and no pupillary dilation observed. Patient has yet to demonstrate stability and requires continued inpatient hospitalization for safety at this time.     Compliant with scheduled medications: [x] Yes    [] No     Received emergency medications in past 24 hrs: [] Yes   [x] No    Appetite:  [x] Normal/Adequate/Unchanged  [] Increased  [] Decreased      Sleep:       [x] Normal/Adequate/Unchanged  [] Fair  [] Poor      Group Attendance on Unit:   [x] Yes  [] Selectively    [] No    Medication Side Effects: Denies         Mental Status Exam  Level of consciousness: Alert and awake   Appearance: Appropriate attire for setting, seated in chair, with fair  grooming and hygiene   Behavior/Motor: Approachable, no psychomotor abnormalities   Attitude toward examiner: Cooperative, attentive, good eye contact   Speech: spontaneous, normal rate, normal volume and well articulated   Mood: Patient reports \"anxious and depressed on and off today\"  Affect: Blunted  Thought processes: linear and goal directed   Thought content: Denies homicidal ideation  Suicidal Ideation: Reports improving suicidal ideation, unable to contract for safety off unit. Delusions: Denies  Perceptual Disturbance: Denies. Does not appear to be responding to internal stimuli. Cognition: Oriented to self, location, time, and situation  Memory: intact  Insight & Judgement: Fair    Data   height is 6' (1.829 m) and weight is 265 lb (120.2 kg). His temporal temperature is 97.1 °F (36.2 °C). His blood pressure is 137/67 and his pulse is 67. His respiration is 14 and oxygen saturation is 96%.    Labs:   Admission on 05/05/2022   Component Date Value Ref Range Status    WBC 05/05/2022 11.7* 3.5 - 11.0 k/uL Final    RBC 05/05/2022 5.89  4.5 - 5.9 m/uL Final    Hemoglobin 05/05/2022 16.1  13.5 - 17.5 g/dL Final    Hematocrit 05/05/2022 46.9  41 - 53 % Final    MCV 05/05/2022 79.7* 80 - 100 fL Final    MCH 05/05/2022 27.4  26 - 34 pg Final    MCHC 05/05/2022 34.4  31 - 37 g/dL Final    RDW 05/05/2022 13.8  11.5 - 14.9 % Final    Platelets 07/35/8298 227  150 - 450 k/uL Final    MPV 05/05/2022 9.4  6.0 - 12.0 fL Final    Seg Neutrophils 05/05/2022 71* 36 - 66 % Final    Lymphocytes 05/05/2022 18* 24 - 44 % Final    Monocytes 05/05/2022 5  1 - 7 % Final    Eosinophils % 05/05/2022 5* 0 - 4 % Final    Basophils 05/05/2022 1  0 - 2 % Final    Segs Absolute 05/05/2022 8.30  1.3 - 9.1 k/uL Final    Absolute Lymph # 05/05/2022 2.10  1.0 - 4.8 k/uL Final    Absolute Mono # 05/05/2022 0.60  0.1 - 1.3 k/uL Final    Absolute Eos # 05/05/2022 0.50* 0.0 - 0.4 k/uL Final    Basophils Absolute 05/05/2022 0.10  0.0 - 0.2 k/uL Final    Glucose 05/05/2022 158* 70 - 99 mg/dL Final    BUN 05/05/2022 12  6 - 20 mg/dL Final    CREATININE 05/05/2022 0.92  0.70 - 1.20 mg/dL Final    Calcium 05/05/2022 10.0  8.6 - 10.4 mg/dL Final    Sodium 05/05/2022 140  135 - 144 mmol/L Final    Potassium 05/05/2022 3.4* 3.7 - 5.3 mmol/L Final    Chloride 05/05/2022 103  98 - 107 mmol/L Final    CO2 05/05/2022 21  20 - 31 mmol/L Final    Anion Gap 05/05/2022 16  9 - 17 mmol/L Final    Alkaline Phosphatase 05/05/2022 63  40 - 129 U/L Final    ALT 05/05/2022 38  5 - 41 U/L Final    AST 05/05/2022 23  <40 U/L Final    Total Bilirubin 05/05/2022 0.29* 0.3 - 1.2 mg/dL Final    Total Protein 05/05/2022 7.7  6.4 - 8.3 g/dL Final    Albumin 05/05/2022 4.7  3.5 - 5.2 g/dL Final    GFR Non- 05/05/2022 >60  >60 mL/min Final    GFR  05/05/2022 >60  >60 mL/min Final    GFR Comment 05/05/2022        Final    Comment: Average GFR for 20-28 years old:   116 mL/min/1.73sq m  Chronic Kidney Disease:   <60 mL/min/1.73sq m  Kidney failure:   <15 mL/min/1.73sq m              eGFR calculated using average adult body mass.  Additional eGFR calculator available at:        [employer]            Color, UA 05/05/2022 Yellow  Yellow Final    Turbidity UA 05/05/2022 Clear  Clear Final    Glucose, Ur 05/05/2022 TRACE* NEGATIVE Final    Bilirubin Urine 05/05/2022 NEGATIVE  NEGATIVE Final    Ketones, Urine 05/05/2022 NEGATIVE  NEGATIVE Final    Specific Gravity, UA 05/05/2022 1.006  1.000 - 1.030 Final    Urine Hgb 05/05/2022 NEGATIVE  NEGATIVE Final    pH, UA 05/05/2022 5.0  5.0 - 8.0 Final    Protein, UA 05/05/2022 NEGATIVE  NEGATIVE Final    Urobilinogen, Urine 05/05/2022 Normal  Normal Final    Nitrite, Urine 05/05/2022 NEGATIVE  NEGATIVE Final    Leukocyte Esterase, Urine 05/05/2022 NEGATIVE  NEGATIVE Final    WBC, UA 05/05/2022 0 TO 2  /HPF Final    RBC, UA 05/05/2022 0 TO 2  /HPF Final    Casts UA 05/05/2022 0 TO 2  /LPF Final    Epithelial Cells UA 05/05/2022 0 TO 2  /HPF Final    Bacteria, UA 05/05/2022 None  None Final    Ethanol 05/05/2022 107* <10 mg/dL Final    Ethanol percent 05/05/2022 0.107  % Final    Amphetamine Screen, Ur 05/05/2022 NEGATIVE  NEGATIVE Final    Comment:       (Positive cutoff 1000 ng/mL)                  Barbiturate Screen, Ur 05/05/2022 NEGATIVE  NEGATIVE Final    Comment:       (Positive cutoff 200 ng/mL)                  Benzodiazepine Screen, Urine 05/05/2022 NEGATIVE  NEGATIVE Final    Comment:       (Positive cutoff 200 ng/mL)                  Cocaine Metabolite, Urine 05/05/2022 NEGATIVE  NEGATIVE Final    Comment:       (Positive cutoff 300 ng/mL)                  Methadone Screen, Urine 05/05/2022 NEGATIVE  NEGATIVE Final    Comment:       (Positive cutoff 300 ng/mL)                  Opiates, Urine 05/05/2022 NEGATIVE  NEGATIVE Final    Comment:       (Positive cutoff 300 ng/mL)                  Phencyclidine, Urine 05/05/2022 NEGATIVE  NEGATIVE Final    Comment:       (Positive cutoff 25 ng/mL)                  Cannabinoid Scrn, Ur 05/05/2022 NEGATIVE  NEGATIVE Final    Comment:       (Positive cutoff 50 ng/mL)                  Oxycodone Screen, Ur 05/05/2022 NEGATIVE  NEGATIVE Final    Comment:       (Positive cutoff 100 ng/mL)                  Test Information 05/05/2022 Assay provides medical screening only. The absence of expected drug(s) and/or metabolite(s) may indicate diluted or adulterated urine, limitations of testing or timing of collection. Final    Comment: Testing for legal purposes should be confirmed by another method. To request confirmation   of test result, please call the lab within 7 days of sample submission.       Magnesium 05/05/2022 2.0  1.6 - 2.6 mg/dL Final    Glucose 05/07/2022 118* 70 - 99 mg/dL Final    BUN 05/07/2022 16  6 - 20 mg/dL Final    CREATININE 05/07/2022 1.02  0.70 - 1.20 mg/dL Final    Calcium 05/07/2022 9.8  8.6 - 10.4 mg/dL Final    Sodium 05/07/2022 140  135 - 144 mmol/L Final    Potassium 05/07/2022 4.0  3.7 - 5.3 mmol/L Final    Chloride 05/07/2022 100  98 - 107 mmol/L Final    CO2 05/07/2022 30  20 - 31 mmol/L Final    Anion Gap 05/07/2022 10  9 - 17 mmol/L Final    GFR Non- 05/07/2022 >60  >60 mL/min Final    GFR  05/07/2022 >60  >60 mL/min Final    GFR Comment 05/07/2022        Final    Comment: Average GFR for 20-28 years old:   116 mL/min/1.73sq m  Chronic Kidney Disease:   <60 mL/min/1.73sq m  Kidney failure:   <15 mL/min/1.73sq m              eGFR calculated using average adult body mass. Additional eGFR calculator available at:        [employer]                 Reviewed patient's current plan of care and vital signs with nursing staff.     Labs reviewed: [x] Yes  Last EKG in EMR reviewed: [x] Yes    Medications  Current Facility-Administered Medications: nicotine polacrilex (NICORETTE) gum 2 mg, 2 mg, Oral, Q1H PRN  amLODIPine (NORVASC) tablet 5 mg, 5 mg, Oral, Daily  ARIPiprazole (ABILIFY) tablet 2 mg, 2 mg, Oral, Daily  cetirizine (ZYRTEC) tablet 10 mg, 10 mg, Oral, Daily  chlorthalidone (HYGROTON) tablet 50 mg, 50 mg, Oral, Daily  PARoxetine (PAXIL) tablet 50 mg, 50 mg, Oral, Daily  acetaminophen (TYLENOL) tablet 650 mg, 650 mg, Oral, Q6H PRN  ibuprofen (ADVIL;MOTRIN) tablet 400 mg, 400 mg, Oral, Q6H PRN  hydrOXYzine (ATARAX) tablet 50 mg, 50 mg, Oral, TID PRN  polyethylene glycol (GLYCOLAX) packet 17 g, 17 g, Oral, Daily PRN  aluminum & magnesium hydroxide-simethicone (MAALOX) 200-200-20 MG/5ML suspension 30 mL, 30 mL, Oral, Q6H PRN  potassium bicarb-citric acid (EFFER-K) effervescent tablet 40 mEq, 40 mEq, Oral, Once  traZODone (DESYREL) tablet 50 mg, 50 mg, Oral, Nightly PRN    ASSESSMENT  Major depressive disorder, recurrent, severe with psychotic features (HCC)    Alcohol use disorder moderate  Rule out cluster B personality disorder  Rule out bipolar disorder  Rule out obsessive-compulsive disorder            HANDOFF  Patient symptoms are:  Modestly improving  Medications as determined by attending physician  Encourage participation in groups and milieu. Probable discharge is to be determined by physician    Electronically signed by DAVID Engel CNP on 5/8/2022 at 1:00 PM    **This report has been created using voice recognition software. It may contain minor errors which are inherent in voice recognition technology. **                                           Psychiatry Attending Attestation     I independently saw and evaluated the patient. I reviewed the Advance Practice Provider's documentation above. Any additional comments or changes to the Advance Practice Provider's documentation are stated below otherwise agree with assessment. Patient feels better than before. Mood and affect are better. Patient reports fleeting suicidal thoughts with no intent or plan. Patient notes that these thoughts are occurring less frequently. Denies any homicidal thoughts, that was explored with the patient. Oriented to time place and person. Recent and remote memory is intact. Patient feels hopeful. Sleep and appetite is good. No side effect from medication reported. Side-effect of medication were discussed with the patient . Patient is responding to current treatment. Discharge soon, if patient continues to show improvement. Case discussed with the staff. PLAN  Patient s symptoms are improving  Will continue same medication today and observe  Attempt to develop insight  Psycho-education conducted. Supportive Therapy conducted.   Probable discharge is tomorrow  Follow-up TBD    Electronically signed by Germaine Butts MD on 5/8/22 at 1:19 PM EDT

## 2022-05-08 NOTE — CARE COORDINATION
Discharge Arrangements:  Social work met with patient today to discuss discharge plans for tomorrow. He mentioned needing to have firearms removed from his home by his mother and was agreeable for social work to call Yolanda Gonzáles [phone] however when dialing the number it does not ring and goes straight to voice mail. Left a voice message for her to return the call to . He has scheduled appointments already scheduled with [facility]. 12:01 PM: Luz Kumar calls  back and states she plans to retrieve patient's firearms this afternoon to take to her home for safekeeping. Social work provides general update on patient's progress.     Guardian notified: n/a    Discharge destination/address: home   [address]   [facility] [address]    Transported by:  Rachael Bernheim

## 2022-05-08 NOTE — BH NOTE
SAFETY DRILL completed. Food and trash removed from rooms. Pt denies any safety issues at this time.

## 2022-05-08 NOTE — GROUP NOTE
Group Therapy Note    Date: 5/8/2022    Group Start Time: 0900  Group End Time: 0712  Group Topic: Community Meeting    SARAH Pavon        Group Therapy Note    Attendees:9/17         Patient's Goal:  Work on coping skills to deal with anxiety    Notes:  controlled    Status After Intervention:  Unchanged    Participation Level: Active Listener and Interactive    Participation Quality: Appropriate and Attentive      Speech:  normal      Thought Process/Content: Logical      Affective Functioning: Congruent      Mood: anxious      Level of consciousness:  Alert and Attentive      Response to Learning: Able to verbalize current knowledge/experience and Progressing to goal      Endings: None Reported    Modes of Intervention: Education, Support and Socialization      Discipline Responsible: Behavorial Health Tech      Signature:   Jenna Jeffries

## 2022-05-09 VITALS
SYSTOLIC BLOOD PRESSURE: 137 MMHG | HEART RATE: 73 BPM | BODY MASS INDEX: 35.89 KG/M2 | HEIGHT: 72 IN | DIASTOLIC BLOOD PRESSURE: 78 MMHG | TEMPERATURE: 98.3 F | OXYGEN SATURATION: 96 % | WEIGHT: 265 LBS | RESPIRATION RATE: 12 BRPM

## 2022-05-09 PROCEDURE — 99239 HOSP IP/OBS DSCHRG MGMT >30: CPT | Performed by: PSYCHIATRY & NEUROLOGY

## 2022-05-09 PROCEDURE — 6370000000 HC RX 637 (ALT 250 FOR IP): Performed by: PSYCHIATRY & NEUROLOGY

## 2022-05-09 RX ORDER — CETIRIZINE HYDROCHLORIDE 10 MG/1
10 TABLET ORAL DAILY
Qty: 30 TABLET | Refills: 0 | Status: ON HOLD | OUTPATIENT
Start: 2022-05-09 | End: 2022-08-28 | Stop reason: HOSPADM

## 2022-05-09 RX ORDER — TRAZODONE HYDROCHLORIDE 50 MG/1
50 TABLET ORAL NIGHTLY PRN
Qty: 30 TABLET | Refills: 0 | Status: SHIPPED | OUTPATIENT
Start: 2022-05-09 | End: 2022-08-24

## 2022-05-09 RX ORDER — HYDROXYZINE 50 MG/1
50 TABLET, FILM COATED ORAL 3 TIMES DAILY PRN
Qty: 90 TABLET | Refills: 0 | Status: SHIPPED | OUTPATIENT
Start: 2022-05-09 | End: 2022-06-08

## 2022-05-09 RX ORDER — CHLORTHALIDONE 50 MG/1
50 TABLET ORAL DAILY
Qty: 30 TABLET | Refills: 0 | Status: SHIPPED | OUTPATIENT
Start: 2022-05-09 | End: 2022-08-24

## 2022-05-09 RX ORDER — ARIPIPRAZOLE 2 MG/1
2 TABLET ORAL DAILY
Qty: 30 TABLET | Refills: 0 | Status: SHIPPED | OUTPATIENT
Start: 2022-05-09 | End: 2022-08-24 | Stop reason: DRUGHIGH

## 2022-05-09 RX ORDER — AMLODIPINE BESYLATE 10 MG/1
TABLET ORAL
Qty: 30 TABLET | Refills: 0 | Status: ON HOLD | OUTPATIENT
Start: 2022-05-09 | End: 2022-08-28 | Stop reason: HOSPADM

## 2022-05-09 RX ORDER — PAROXETINE HYDROCHLORIDE 20 MG/1
50 TABLET, FILM COATED ORAL DAILY
Qty: 80 TABLET | Refills: 0 | Status: SHIPPED | OUTPATIENT
Start: 2022-05-09 | End: 2022-08-24 | Stop reason: DRUGHIGH

## 2022-05-09 RX ADMIN — BUPROPION HYDROCHLORIDE 50 MG: 150 TABLET, FILM COATED, EXTENDED RELEASE ORAL at 08:43

## 2022-05-09 RX ADMIN — CETIRIZINE HYDROCHLORIDE 10 MG: 10 TABLET, FILM COATED ORAL at 08:43

## 2022-05-09 RX ADMIN — ARIPIPRAZOLE 2 MG: 5 TABLET ORAL at 08:43

## 2022-05-09 RX ADMIN — CHLORTHALIDONE 50 MG: 25 TABLET ORAL at 08:43

## 2022-05-09 RX ADMIN — AMLODIPINE BESYLATE 5 MG: 5 TABLET ORAL at 08:43

## 2022-05-09 ASSESSMENT — LIFESTYLE VARIABLES
HOW MANY STANDARD DRINKS CONTAINING ALCOHOL DO YOU HAVE ON A TYPICAL DAY: 3 OR 4
HOW OFTEN DO YOU HAVE A DRINK CONTAINING ALCOHOL: 4 OR MORE TIMES A WEEK

## 2022-05-09 NOTE — GROUP NOTE
Group Therapy Note    Date: 5/9/2022    Group Start Time: 1100  Group End Time: 2720  Group Topic: Recreational    STANAY BHMYRNA ROGER    Bedford Goldmann        Group Therapy Note    Attendees: 9/16       Patient's Goal:  Patients engaged in UserZoom game group to increase social interaction; Increase cognitive stimulation; and improved sense of community    Notes:  Patient attended and participated in group having positive interactions with peers and staff. Patient was pleasant and engaging throughout    Status After Intervention:  Improved    Participation Level:  Active Listener and Interactive    Participation Quality: Appropriate, Attentive and Sharing      Speech:  normal      Thought Process/Content: Logical  Linear      Affective Functioning: Congruent      Mood: euthymic      Level of consciousness:  Alert and Attentive      Response to Learning: Able to verbalize current knowledge/experience and Progressing to goal      Endings: None Reported    Modes of Intervention: Socialization, Activity and Reality-testing      Discipline Responsible: Psychoeducational Specialist      Signature:  Bedford Goldmann

## 2022-05-09 NOTE — DISCHARGE SUMMARY
Provider Discharge Summary     Patient ID:  Flora Medina  [id number]  42 y.o.  1995    Admit date: 5/5/2022    Discharge date and time: 5/9/2022  12:59 PM     Admitting Physician: Ben Walton MD     Discharge Physician: Ben Walton MD    Admission Diagnoses: Suicidal ideations [R45.851]  Depression with suicidal ideation [F32. A, R45.851]    Discharge Diagnoses:      Major depressive disorder, recurrent, severe with psychotic features [facility])     Patient Active Problem List   Diagnosis Code    Diya janeobe S01.319A    Precordial pain R07.2    PVC's (premature ventricular contractions) I49.3    Essential hypertension I10    Unable to control anger R45.4    Cyclothymic disorder F34.0    Chest pain R07.9    PVC (premature ventricular contraction) I49.3    Seasonal allergies J30.2    Cardiomyopathy, alcoholic (HCC) M05.2    History of cardiac radiofrequency ablation (RFA) Z98.890    Depression with suicidal ideation F32. A, R45.851    Major depressive disorder, recurrent, severe with psychotic features ([facility] [address] F33.3    Alcohol use disorder, moderate, in controlled environment ([facility] [address] F10.20        Admission Condition: poor    Discharged Condition: stable    Indication for Admission: threat to self    History of Present Illnes (present tense wording is of findings from admission exam and are not necessarily indicative of current findings):   Flora Medina is a 32 y.o. male who has a past medical history of cyclothymic disorder, premature ventricular contraction, and anger. Patient presented to the ED endorsing suicidal ideation with a plan to end his life by shooting himself. Patient does verbalize having access to a shotgun. Patient is admitted voluntarily.     At time of assessment, patient reports worsening depression. States that he has having relationship issues, but does not wish to discuss these with writer.   Evasive in discussing situation, but verbalizes intense suicidal ideation with thoughts to shoot himself or run his car into a pole. Believes he was very close to engaging in harm. Does not feel safe from self in the community. Discussed removing gun from home, and patient is adamantly opposed. Reports that it belonged to his grandfather.     We reviewed patient's symptoms. He endorses multiple episodes of depression lasting 2 weeks or longer in which she felt down and depressed more days than not for majority of the day. Endorses feelings of hopelessness, helplessness, guilt and shame. Also reports poor energy and motivation, hypersomnia, and increased suicidal ideation. He denies any history of suicide attempts but does endorse self-mutilation. States that he used to engage in burning himself, but that was years ago. Patient states that when he is down and depressed, he believes people are talking about him at work. Believes that this is beyond his normal level of anxiety and nods his head indicating that he feels it is more delusional and paranoid. States that this paranoia is only present when his mood is depressed, and denies feeling this way at baseline. Patient denies any history of perceptual disturbances. Denies any other psychotic phenomena besides what was discussed above. Explored for manic episodes. Patient does verbalize irritability and impulsiveness that occur for a period of approximately 2 days. States that he has anger issues, but they are always present with situational stressors. He endorses racing thoughts that are constantly present, and denies that they occur episodically. He denies any periods of needing less sleep for a period of 4 days or longer.     Patient does verbalize intrusive thoughts. States that on a daily basis he has distressing thoughts of his family dying. He states he is unable to avoid these thoughts and needs to be active in order to distract himself.   Patient denies having to perform any specific task or repetitive movement in order to have these intrusive thoughts go away. Patient does endorse anxiety, but denies that it is present on a daily basis or that it impairs his daily life. He denies any history of trauma, abuse or neglect or any associated symptoms with PTSD.     Patient has a previous psychiatric history. Reports following up with [facility] and sees a therapist, Daisy Acevedo as well as a mental health provider, Bridger Garcia. Reports his current medications are Abilify and Paxil. He felt that they were helpful at first, but are not therapeutic recently. Previous medications include fluoxetine, sertraline, escitalopram, citalopram, venlafaxine, bupropion, and quetiapine. Does not feel that any of these medications were helpful, but denies any side effects.     Patient reports a history of alcohol abuse, and currently consumes multiple beers nightly to help \"take the edge off a long day of work. \"  Patient minimizing of his alcohol use. Reports having a DUI and is on probation. He is unable to drive. He was noted to have a blood alcohol level of 0.107. Liver panel obtained, within normal limits. Urine toxicology is negative. Does verbalize using cannabis approximately once a week, but denies any other recreational substances. Patient does have a history of opioid dependence, but has been sober for many years.     Patient unable to contract for safety off unit. Requires inpatient hospitalization for the above-noted psychiatric concerns.     Hospital Course:   Upon admission, Leonardo Caldera was provided a safe secure environment, introduced to unit milieu. Patient participated in groups and individual therapies. Meds were adjusted as noted below. After few days of hospital care, patient began to feel improvement. Depression lifted, thoughts to harm self ceased. Sleep improved, appetite was good. On morning rounds 5/9/2022, Leonardo Caldera endorses feeling ready for discharge.  Patient denies suicidal or homicidal ideations, denies hallucinations or delusions. Denies SE's from meds. It was decided that maximum benefit from hospital care had been achieved and patient can be discharged. Consults:   None    Significant Diagnostic Studies: Routine labs and diagnostics    Treatments: Psychotropic medications, therapy with group, milieu, and 1:1 with nurses, social workers, PANAYELI/CNP, and Attending physician.       Discharge Medications:  Discharge Medication List as of 5/9/2022 10:49 AM      START taking these medications    Details   hydrOXYzine (ATARAX) 50 MG tablet Take 1 tablet by mouth 3 times daily as needed for Anxiety, Disp-90 tablet, R-0Normal         CONTINUE these medications which have CHANGED    Details   PARoxetine (PAXIL) 20 MG tablet Take 2.5 tablets by mouth daily, Disp-80 tablet, R-0Normal      traZODone (DESYREL) 50 MG tablet Take 1 tablet by mouth nightly as needed for Depression, Disp-30 tablet, R-0Normal      cetirizine (ZYRTEC) 10 MG tablet Take 1 tablet by mouth daily, Disp-30 tablet, R-0Normal      ARIPiprazole (ABILIFY) 2 MG tablet Take 1 tablet by mouth daily, Disp-30 tablet, R-0Normal      amLODIPine (NORVASC) 10 MG tablet TAKE 1 TABLET BY MOUTH ONE TIME A DAY, Disp-30 tablet, R-0Normal      chlorthalidone (HYGROTEN) 50 MG tablet Take 1 tablet by mouth daily, Disp-30 tablet, R-0Normal         CONTINUE these medications which have NOT CHANGED    Details   ibuprofen (ADVIL;MOTRIN) 800 MG tablet Take 800 mg by mouth every 6 hours as needed for PainHistorical Med         STOP taking these medications       sildenafil (VIAGRA) 50 MG tablet Comments:   Reason for Stopping:         montelukast (SINGULAIR) 10 MG tablet Comments:   Reason for Stopping:                Core Measures statement:   Not applicable    Discharge Exam:  Level of consciousness:  Within normal limits  Appearance: Street clothes, seated, with good grooming  Behavior/Motor: No abnormalities noted  Attitude toward examiner:  Cooperative, attentive, good eye contact  Speech:  spontaneous, normal rate, normal volume and well articulated  Mood:  euthymic  Affect:  Full range  Thought processes:  linear, goal directed and coherent  Thought content:  denies homicidal ideation  Suicidal Ideation:  denies suicidal ideation  Delusions:  no evidence of delusions  Perceptual Disturbance:  denies any perceptual disturbance  Cognition:  Intact  Memory: age appropriate  Insight & Judgement: fair  Medication side effects: denies     Disposition: home    Patient Instructions: Activity: activity as tolerated  1. Patient instructed to take medications regularly and follow up with outpatient appointments. Follow-up as scheduled with [facility]       Signed:    Electronically signed by Lander Libman, MD on 5/9/22 at 12:59 PM EDT    Time Spent on discharge is more than 35 minutes in the examination, evaluation, counseling and review of medications and discharge plan.

## 2022-05-09 NOTE — GROUP NOTE
Group Therapy Note    Date: 5/8/2022    Group Start Time: 2030  Group End Time: 2139  Group Topic: Wrap-Up    SARAH Mariscal        Group Therapy Note    Attendees: 13         Patient's Goal:  Exercise, bike riding    Notes: To be one with nature    Status After Intervention:  Improved    Participation Level:  Active Listener and Interactive    Participation Quality: Appropriate, Attentive and Sharing      Speech:  normal      Thought Process/Content: Logical      Affective Functioning: Congruent      Mood: WNL      Level of consciousness:  Alert, Oriented x4 and Attentive      Response to Learning: Able to verbalize current knowledge/experience      Endings: None Reported    Modes of Intervention: Problem-solving      Discipline Responsible: Vixlo      Signature:  Dylan Mcghee

## 2022-05-09 NOTE — DISCHARGE INSTR - DIET

## 2022-05-09 NOTE — CARE COORDINATION
Discharge Arrangements: [place] 5/11 9:30am    Guardian notified: N/A  Discharge destination/address: home address  Transported by:  family

## 2022-05-09 NOTE — CARE COORDINATION
Name: Nigel Hurd    : 1995    Discharge Date: 22    Primary Auth/Cert #: [id number]    Destination: Private residence    Discharge Medications:      Medication List      START taking these medications    hydrOXYzine 50 MG tablet  Commonly known as: ATARAX  Take 1 tablet by mouth 3 times daily as needed for Anxiety  Notes to patient: Anxiety        CHANGE how you take these medications    chlorthalidone 50 MG tablet  Commonly known as: HYGROTEN  Take 1 tablet by mouth daily  What changed:   · how much to take  · how to take this  · when to take this  · additional instructions  Notes to patient: Hypertension treatment     PARoxetine 20 MG tablet  Commonly known as: PAXIL  Take 2.5 tablets by mouth daily  What changed:   · medication strength  · See the new instructions.   Notes to patient: depression        CONTINUE taking these medications    amLODIPine 10 MG tablet  Commonly known as: NORVASC  TAKE 1 TABLET BY MOUTH ONE TIME A DAY  Notes to patient: High blood pressure treatment     ARIPiprazole 2 MG tablet  Commonly known as: ABILIFY  Take 1 tablet by mouth daily  Notes to patient: Bi-polar treatment     cetirizine 10 MG tablet  Commonly known as: ZYRTEC  Take 1 tablet by mouth daily  Notes to patient: Allergy relief     ibuprofen 800 MG tablet  Commonly known as: ADVIL;MOTRIN  Notes to patient: Pain relief     traZODone 50 MG tablet  Commonly known as: DESYREL  Take 1 tablet by mouth nightly as needed for Depression  Notes to patient: Sleep aid        STOP taking these medications    acetaminophen 500 MG tablet  Commonly known as: TYLENOL  Notes to patient: STOP TAKING!! STOP TAKING!! STOP TAKING!!     albuterol sulfate  (90 Base) MCG/ACT inhaler  Commonly known as: Proventil HFA  Notes to patient: STOP TAKING!! STOP TAKING!! STOP TAKING!!     hydrocortisone 2.5 % ointment  Notes to patient: STOP TAKING!! STOP TAKING!! STOP TAKING!!     vitamin D 1.25 MG (30391 UT) Caps capsule  Commonly known as: ERGOCALCIFEROL  Notes to patient: STOP TAKING!! STOP TAKING!! STOP TAKING!!            Where to Get Your Medications      These medications were sent to [address] [place] [address], [address], [address] [place] [address]    Phone: [phone]   · amLODIPine 10 MG tablet  · ARIPiprazole 2 MG tablet  · cetirizine 10 MG tablet  · chlorthalidone 50 MG tablet  · hydrOXYzine 50 MG tablet  · PARoxetine 20 MG tablet  · traZODone 50 MG tablet         Follow Up Appointment: [id number]  [facility], [facility]  Phone: [phone]    On 5/11/2022  You are scheduled with your psychiatrist Ángela Perry at 9:30 AM

## 2022-05-09 NOTE — BH NOTE
[facility]  Discharge Note    Pt discharged with followings belongings:   Dental Appliances: None  Vision - Corrective Lenses: None  Hearing Aid: None  Jewelry: None  Body Piercings Removed: N/A  Clothing: Footwear,Jacket/Coat,Pants,Shirt,Socks,Undergarments  Other Valuables: Wallet (multiple VISA/MC, 2 IDs)   Valuables sent home with Patient or returned to patient. Patient education on aftercare instructions: provided by RN  Information faxed to [facility] by RN  at 10:39 AM .Patient verbalize understanding of AVS:  YES. Status EXAM upon discharge:  Mental Status and Behavioral Exam  Normal: No  Level of Assistance: Independent/Self  Facial Expression: Brightened,Flat  Affect: Blunt  Level of Consciousness: Alert  Frequency of Checks: 4 times per hour, close  Mood:Normal: No  Mood: Depressed,Sad  Motor Activity:Normal: Yes  Motor Activity: Increased  Eye Contact: Fair  Observed Behavior: Cooperative,Withdrawn,Guarded,Preoccupied  Sexual Misconduct History: Current - no  Preception: [place] to person,[place] to time,[place] to place,[place] to situation  Attention:Normal: Yes  Attention: Unable to concentrate  Thought Processes: Other (comment) (logical)  Thought Content:Normal: Yes  Depression Symptoms: Feelings of helplessness,Loss of interest  Anxiety Symptoms: Generalized  Luann Symptoms: No problems reported or observed.   Hallucinations: None  Delusions: No  Memory:Normal: Yes  Insight and Judgment: No  Insight and Judgment: Poor insight      Metabolic Screening:    Lab Results   Component Value Date    LABA1C 5.3 05/02/2021       No results found for: CHOL  No results found for: TRIG  Lab Results   Component Value Date    HDL 41 05/02/2021     No components found for: LDLCAL  No results found for: LABVLDL      Patient ambulated to the [facility] entrance with all belongings and copy of his AVS; Patient to follow-up with [place] for behavioral health; Patient to  medications at [facility]  in [address] [place] [address]. Patient transported to private residence via private automobile.   Nicholas Dubose RN

## 2022-05-09 NOTE — BH NOTE
Patient given tobacco quitline number 09991064880 at this time, refusing to call at this time, states \" I just dont want to quit now\"- patient given information as to the dangers of long term tobacco use. Continue to reinforce the importance of tobacco cessation.

## 2022-05-09 NOTE — PLAN OF CARE
Problem: Behavior  Goal: Pt/Family maintain appropriate behavior and adhere to behavioral management agreement, if implemented  Description: INTERVENTIONS:  1. Assess patient/family's coping skills and  non-compliant behavior (including use of illegal substances)  2. Notify security of behavior or suspected illegal substances which indicate the need for search of the patient and/or belongings  3. Encourage verbalization of thoughts and concerns in a socially appropriate manner  4. Utilize positive, consistent limit setting strategies supporting safety of patient, staff and others  5. Encourage participation in the decision making process about the behavioral management agreement  6. Implement a Health Care Agreement if patient meets criteria  7. If a patient's behavior jeopardizes the safety of the patient, staff, or others refer to organization policy. If a visitor's behavior poses a threat to safety call refer to organization policy. 8. Initiate consult with , Psychosocial CNS, Spiritual Care as appropriate  5/9/2022 0926 by Delma Gray RN  Outcome: Completed  Flowsheets (Taken 5/9/2022 2057)  Patient/family maintains appropriate behavior and adheres to behavioral management agreement, if implemented:   Assess patient/familys coping skills and  non-compliant behavior (including use of illegal substances)   Encourage verbalization of thoughts and concerns in a socially appropriate manner     Problem: Depression/Self Harm  Goal: Effect of psychiatric condition will be minimized and patient will be protected from self harm  Description: INTERVENTIONS:  1. Assess impact of patient's symptoms on level of functioning, self care needs and offer support as indicated  2. Assess patient/family knowledge of depression, impact on illness and need for teaching  3. Provide emotional support, presence and reassurance  4. Assess for possible suicidal thoughts or ideation.  If patient expresses suicidal thoughts or statements do not leave alone, initiate Suicide Precautions, move to a room close to the nursing station and obtain sitter  5.  Initiate consults as appropriate with Mental Health Professional, Spiritual Care, Psychosocial CNS, and consider a recommendation to the LIP for a Psychiatric Consultation  5/9/2022 0926 by Kari Tsai RN  Outcome: Completed  Flowsheets (Taken 5/9/2022 6526)  Effect of psychiatric condition will be minimized and patient will be protected from self harm: Assess impact of patients symptoms on level of functioning, self care needs and offer support as indicated

## 2022-06-08 ENCOUNTER — TELEPHONE (OUTPATIENT)
Dept: PRIMARY CARE CLINIC | Age: 27
End: 2022-06-08

## 2022-06-08 NOTE — TELEPHONE ENCOUNTER
No, he has significant mental health disorder and had recent [facility] admission.  Thank you

## 2022-06-08 NOTE — LETTER
[facility]  [address]  [facility] [address]  Phone: [phone]  Fax: [phone], APRN - CNP        June 8, 2022     [address] [address]      Dear Rachel Middleton: We missed seeing you for a scheduled appointment at [address] with DAVID Reene CNP on 6/8/2022. We're sorry you were unable to keep your appointment and hope that you are doing well. We ask that you please call 24 hours in advance if you are unable to make your appointment, so that we can give that time to another patient in need. We care about you and the management of your healthcare and want to make sure that you follow up as recommended. To provide quality care and timely appointments to all our patients, you may be dismissed from the practice if you do not show for three (3) scheduled appointments within a 12-month period. We would like to continue treating your healthcare needs. Please call the office to reschedule your appointment, if needed.      Sincerely,        DAVID Renee CNP

## 2022-06-09 ENCOUNTER — HOSPITAL ENCOUNTER (EMERGENCY)
Age: 27
Discharge: HOME | End: 2022-06-09
Attending: EMERGENCY MEDICINE
Payer: COMMERCIAL

## 2022-06-09 VITALS
BODY MASS INDEX: 35.21 KG/M2 | DIASTOLIC BLOOD PRESSURE: 77 MMHG | RESPIRATION RATE: 18 BRPM | SYSTOLIC BLOOD PRESSURE: 141 MMHG | WEIGHT: 260 LBS | TEMPERATURE: 97.7 F | HEART RATE: 57 BPM | OXYGEN SATURATION: 96 % | HEIGHT: 72 IN

## 2022-06-09 DIAGNOSIS — K02.9 PAIN DUE TO DENTAL CARIES: Primary | ICD-10-CM

## 2022-06-09 PROCEDURE — 6370000000 HC RX 637 (ALT 250 FOR IP): Performed by: EMERGENCY MEDICINE

## 2022-06-09 PROCEDURE — 99283 EMERGENCY DEPT VISIT LOW MDM: CPT

## 2022-06-09 RX ORDER — PENICILLIN V POTASSIUM 250 MG/1
500 TABLET ORAL ONCE
Status: COMPLETED | OUTPATIENT
Start: 2022-06-09 | End: 2022-06-09

## 2022-06-09 RX ORDER — PENICILLIN V POTASSIUM 500 MG/1
500 TABLET ORAL 4 TIMES DAILY
Qty: 28 TABLET | Refills: 0 | Status: SHIPPED | OUTPATIENT
Start: 2022-06-09 | End: 2022-06-16

## 2022-06-09 RX ADMIN — PENICILLIN V POTASSIUM 500 MG: 250 TABLET, FILM COATED ORAL at 23:15

## 2022-06-09 ASSESSMENT — PAIN DESCRIPTION - DESCRIPTORS: DESCRIPTORS: THROBBING

## 2022-06-09 ASSESSMENT — PAIN SCALES - GENERAL: PAINLEVEL_OUTOF10: 10

## 2022-06-09 ASSESSMENT — PAIN DESCRIPTION - LOCATION: LOCATION: TEETH

## 2022-06-09 ASSESSMENT — PAIN DESCRIPTION - ORIENTATION: ORIENTATION: UPPER;RIGHT

## 2022-06-10 NOTE — ED PROVIDER NOTES
[id number] [facility] ED  [address] [facility] [address]  Phone: [phone]  Fax: [phone]      Pt Name: Judy Pineda  FJV:[id number]  [place] 1995  Date of evaluation: 6/9/2022      CHIEF COMPLAINT     Dental pain    HISTORY OF PRESENT ILLNESS   Judy Pineda is a 32 y.o. male who presents for evaluation of dental pain. The patient reports that starting 2 days ago he developed gradual onset, constant, progressive, sharp, stabbing, right upper dental pain around tooth #2 there radiates to the right side of his face. He states that it feels like the feeling is falling out. He does use chewing tobacco and states that this irritates his pain. Drinking fluids also irritates his pain. The patient has been taking ibuprofen and Tylenol without improvement and he cannot sleep tonight secondary to his symptoms. He states that he scheduled to see his dentist in 1 week. He has tried Orajel today without improvement. The patient denies fever, chills, headache, vision changes, neck pain, back pain, chest pain, shortness of breath, abdominal pain, urinary/bowel symptoms, nausea, vomiting, recent injury or illness. REVIEW OF SYSTEMS     Ten point review of systems was reviewed and is negative unless otherwise noted in the HPI    [address]    has a past medical history of Asthma, Back pain, Drug-induced mood disorder ([address] Hypertension, PVC (premature ventricular contraction), Seasonal allergies, and Torn earlobe. SURGICAL HISTORY      has a past surgical history that includes External ear surgery and Ventricular ablation surgery (11/10/2017).     CURRENT MEDICATIONS       Discharge Medication List as of 6/9/2022 11:12 PM      CONTINUE these medications which have NOT CHANGED    Details   PARoxetine (PAXIL) 20 MG tablet Take 2.5 tablets by mouth daily, Disp-80 tablet, R-0Normal      traZODone (DESYREL) 50 MG tablet Take 1 tablet by mouth nightly as needed for Depression, Disp-30 tablet, R-0Normal      cetirizine (ZYRTEC) 10 MG tablet Take 1 tablet by mouth daily, Disp-30 tablet, R-0Normal      ARIPiprazole (ABILIFY) 2 MG tablet Take 1 tablet by mouth daily, Disp-30 tablet, R-0Normal      amLODIPine (NORVASC) 10 MG tablet TAKE 1 TABLET BY MOUTH ONE TIME A DAY, Disp-30 tablet, R-0Normal      chlorthalidone (HYGROTEN) 50 MG tablet Take 1 tablet by mouth daily, Disp-30 tablet, R-0Normal      ibuprofen (ADVIL;MOTRIN) 800 MG tablet Take 800 mg by mouth every 6 hours as needed for PainHistorical Med             ALLERGIES     is allergic to cat hair extract. FAMILY HISTORY     He indicated that his mother is alive. He indicated that his father is alive. He indicated that the status of his maternal grandfather is unknown. He indicated that the status of his paternal grandfather is unknown.     family history includes Alcohol Abuse in his father; Asthma in his mother; Coronary Art Dis in his maternal grandfather and paternal grandfather; Diabetes in his father; Heart Disease in his father and mother; High Blood Pressure in his father. SOCIAL HISTORY      reports that he quit smoking about 5 years ago. His smoking use included cigarettes. He started smoking about 9 years ago. He has a 1.00 pack-year smoking history. His smokeless tobacco use includes snuff and chew. He reports current alcohol use. He reports previous drug use. Drugs: Cocaine, Marijuana (Javier Lights), Opiates , and Other-see comments. PHYSICAL EXAM     INITIAL VITALS:  height is 6' (1.829 m) and weight is 117.9 kg (260 lb). His axillary temperature is 97.7 °F (36.5 °C). His blood pressure is 141/77 (abnormal) and his pulse is 57. His respiration is 18 and oxygen saturation is 96%. CONSTITUTIONAL: no apparent distress, well appearing  SKIN: warm, dry, no jaundice, hives or petechiae  EYES: clear conjunctiva, non-icteric sclera  HENT: normocephalic, atraumatic, moist mucus membranes. Poor dentition. Multiple decayed teeth. Pain with palpation over the right upper gumline without any drainable abscess. Posterior oropharynx is clear without any erythema, edema, exudate or asymmetry. Uvula midline. No trismus or malocclusion. No pain to palpation over the frontal or maxillary sinuses. No mastoid tenderness. Able to handle secretions. Normal speech. Patent airway. NECK: Nontender and supple with no nuchal rigidity, full range of motion  PULMONARY: clear to auscultation without wheezes, rhonchi, or rales, normal excursion, no accessory muscle use and no stridor  CARDIOVASCULAR: regular rate, rhythm. Strong radial pulses with intact distal perfusion. Capillary refill <2 seconds. GASTROINTESTINAL: soft, non-tender, non-distended, no palpable masses, no rebound or guarding   GENITOURINARY: No costovertebral angle tenderness to palpation  MUSCULOSKELETAL: No midline spinal tenderness, step off or deformity. Extremities are otherwise nontender to palpation and nonerythematous. Compartments soft. No peripheral edema. NEUROLOGIC: alert and oriented x 3, GCS 15, normal mentation and speech. Moves all extremities x 4 without motor or sensory deficit, gait is stable without ataxia  PSYCHIATRIC: normal mood and affect, thought process is clear and linear    DIAGNOSTIC RESULTS     EKG:  None    RADIOLOGY:   No results found. LABS:  No results found for this visit on 06/09/22.     EMERGENCY DEPARTMENT COURSE:        The patient was given the following medications:  Orders Placed This Encounter   Medications    penicillin v potassium (VEETID) tablet 500 mg     Order Specific Question:   Antimicrobial Indications     Answer:   Head and Neck Infection    penicillin v potassium (VEETID) 500 MG tablet     Sig: Take 1 tablet by mouth 4 times daily for 7 days     Dispense:  28 tablet     Refill:  0        Vitals:    Vitals:    06/09/22 2215   BP: (!) 141/77   Pulse: 57   Resp: 18   Temp: 97.7 °F (36.5 °C)   TempSrc: Axillary   SpO2: 96%   Weight: 117.9 kg (260 lb)   Height: 6' (1.829 m)     -------------------------  BP: (!) 141/77, Temp: 97.7 °F (36.5 °C), Heart Rate: 57, Resp: 18    CONSULTS:  None    CRITICAL CARE:   None    PROCEDURES:  Dental Block Procedure Note    Indication: dental pain    Procedure: The patient was placed in the supine position. 2 cc of 1% Lidocaine without epinephrine was injected to perform a superior alveolar nerve block on the right. The patient tolerated the procedure well. Complications: None      DIAGNOSIS/ MDM:   Leonardo Caldera is a 32 y.o. male who presents with dental pain. Vital signs are stable. He has poor dentition with pain over the right upper gumline. No drainable abscess. I suspect he is developing a periapical abscess from his decayed teeth. I started him on penicillin and control his pain with a superior alveolar dental block. He was instructed to take ibuprofen or Tylenol as needed for pain and to take his antibiotic as prescribed. He was instructed to follow-up with his dentist as soon as possible and to return to the ER for worsening symptoms or any other concern. Have low suspicion for peritonsillar abscess, Ludewig's angina, or epiglottitis. The patient understands that at this time there is no evidence for a more malignant underlying process, but also understands that early in the process of an illness or injury, an emergency department work-up can be falsely reassuring. Routine discharge counseling was given, and the patient understands that worsening, changing or persistent symptoms should prompt a immediate call or follow-up with their primary care physician or return to the emergency department. The importance of appropriate follow-up was also discussed. I have reviewed the disposition diagnosis with the patient. I have answered their questions and given discharge instructions.   They voiced understanding of these instructions and did not have any further questions or complaints. FINAL IMPRESSION      1. Pain due to dental caries          DISPOSITION/PLAN   DISPOSITION Decision To Discharge 06/09/2022 11:11:51 PM        PATIENT REFERRED TO:  Your dentist    Schedule an appointment as soon as possible for a visit in 1 day      [facility] ED  [address] [place] [address]  [phone]  Go to   If symptoms worsen      DISCHARGE MEDICATIONS:  Discharge Medication List as of 6/9/2022 11:12 PM      START taking these medications    Details   penicillin v potassium (VEETID) 500 MG tablet Take 1 tablet by mouth 4 times daily for 7 days, Disp-28 tablet, R-0Normal             (Please note that portions of this note were completed with a voice recognitionprogram.  Efforts were made to edit the dictations but occasionally words are mis-transcribed.)    Sveta Klein DO, [address] [facility]  Emergency Physician Attending         Sveta Klein DO  06/10/22 6678

## 2022-08-15 RX ORDER — CETIRIZINE HYDROCHLORIDE 10 MG/1
10 TABLET ORAL DAILY
Qty: 30 TABLET | Refills: 0 | OUTPATIENT
Start: 2022-08-15

## 2022-08-20 ENCOUNTER — APPOINTMENT (OUTPATIENT)
Dept: GENERAL RADIOLOGY | Age: 27
End: 2022-08-20
Payer: COMMERCIAL

## 2022-08-20 ENCOUNTER — HOSPITAL ENCOUNTER (EMERGENCY)
Age: 27
Discharge: HOME | End: 2022-08-20
Attending: EMERGENCY MEDICINE
Payer: COMMERCIAL

## 2022-08-20 VITALS
WEIGHT: 230 LBS | SYSTOLIC BLOOD PRESSURE: 143 MMHG | HEIGHT: 70 IN | BODY MASS INDEX: 32.93 KG/M2 | DIASTOLIC BLOOD PRESSURE: 81 MMHG | RESPIRATION RATE: 18 BRPM | HEART RATE: 97 BPM | OXYGEN SATURATION: 98 % | TEMPERATURE: 98.9 F

## 2022-08-20 DIAGNOSIS — R00.0 SINUS TACHYCARDIA: ICD-10-CM

## 2022-08-20 DIAGNOSIS — R00.2 PALPITATIONS: Primary | ICD-10-CM

## 2022-08-20 LAB
ABSOLUTE EOS #: 0.81 K/UL (ref 0–0.4)
ABSOLUTE LYMPH #: 3.36 K/UL (ref 1–4.8)
ABSOLUTE MONO #: 1.15 K/UL (ref 0.1–1.2)
ALBUMIN SERPL-MCNC: 5.3 G/DL (ref 3.5–5.2)
ALBUMIN/GLOBULIN RATIO: 2 (ref 1–2.5)
ALP BLD-CCNC: 71 U/L (ref 40–129)
ALT SERPL-CCNC: 30 U/L (ref 5–41)
ANION GAP SERPL CALCULATED.3IONS-SCNC: 13 MMOL/L (ref 9–17)
AST SERPL-CCNC: 16 U/L
BASOPHILS # BLD: 1 % (ref 0–2)
BASOPHILS ABSOLUTE: 0.08 K/UL (ref 0–0.2)
BILIRUB SERPL-MCNC: 0.31 MG/DL (ref 0.3–1.2)
BUN BLDV-MCNC: 12 MG/DL (ref 6–20)
CALCIUM SERPL-MCNC: 10.1 MG/DL (ref 8.6–10.4)
CHLORIDE BLD-SCNC: 103 MMOL/L (ref 98–107)
CO2: 25 MMOL/L (ref 20–31)
CREAT SERPL-MCNC: 1.23 MG/DL (ref 0.7–1.2)
D-DIMER QUANTITATIVE: 0.23 MG/L FEU
EOSINOPHILS RELATIVE PERCENT: 7 % (ref 1–4)
GFR AFRICAN AMERICAN: >60 ML/MIN
GFR NON-AFRICAN AMERICAN: >60 ML/MIN
GFR SERPL CREATININE-BSD FRML MDRD: ABNORMAL ML/MIN/{1.73_M2}
GLUCOSE BLD-MCNC: 111 MG/DL (ref 70–99)
HCT VFR BLD CALC: 47.2 % (ref 41–53)
HEMOGLOBIN: 16.8 G/DL (ref 13.5–17.5)
LIPASE: 27 U/L (ref 13–60)
LYMPHOCYTES # BLD: 31 % (ref 24–44)
MAGNESIUM: 2 MG/DL (ref 1.6–2.6)
MCH RBC QN AUTO: 28.4 PG (ref 26–34)
MCHC RBC AUTO-ENTMCNC: 35.6 G/DL (ref 31–37)
MCV RBC AUTO: 79.9 FL (ref 80–100)
MONOCYTES # BLD: 11 % (ref 2–11)
PDW BLD-RTO: 13.3 % (ref 12.5–15.4)
PLATELET # BLD: 193 K/UL (ref 140–450)
PMV BLD AUTO: 11.7 FL (ref 8–14)
POTASSIUM SERPL-SCNC: 3.4 MMOL/L (ref 3.7–5.3)
RBC # BLD: 5.91 M/UL (ref 4.5–5.9)
SEG NEUTROPHILS: 50 % (ref 36–66)
SEGMENTED NEUTROPHILS ABSOLUTE COUNT: 5.53 K/UL (ref 1.8–7.7)
SODIUM BLD-SCNC: 141 MMOL/L (ref 135–144)
TOTAL PROTEIN: 7.9 G/DL (ref 6.4–8.3)
TROPONIN, HIGH SENSITIVITY: <6 NG/L (ref 0–22)
WBC # BLD: 10.9 K/UL (ref 3.5–11)

## 2022-08-20 PROCEDURE — 36415 COLL VENOUS BLD VENIPUNCTURE: CPT

## 2022-08-20 PROCEDURE — 71045 X-RAY EXAM CHEST 1 VIEW: CPT

## 2022-08-20 PROCEDURE — 80053 COMPREHEN METABOLIC PANEL: CPT

## 2022-08-20 PROCEDURE — 83690 ASSAY OF LIPASE: CPT

## 2022-08-20 PROCEDURE — 99285 EMERGENCY DEPT VISIT HI MDM: CPT

## 2022-08-20 PROCEDURE — 85025 COMPLETE CBC W/AUTO DIFF WBC: CPT

## 2022-08-20 PROCEDURE — 84484 ASSAY OF TROPONIN QUANT: CPT

## 2022-08-20 PROCEDURE — 85379 FIBRIN DEGRADATION QUANT: CPT

## 2022-08-20 PROCEDURE — 83735 ASSAY OF MAGNESIUM: CPT

## 2022-08-20 PROCEDURE — 93005 ELECTROCARDIOGRAM TRACING: CPT | Performed by: EMERGENCY MEDICINE

## 2022-08-20 PROCEDURE — 2580000003 HC RX 258: Performed by: EMERGENCY MEDICINE

## 2022-08-20 RX ORDER — 0.9 % SODIUM CHLORIDE 0.9 %
1000 INTRAVENOUS SOLUTION INTRAVENOUS ONCE
Status: COMPLETED | OUTPATIENT
Start: 2022-08-20 | End: 2022-08-20

## 2022-08-20 RX ADMIN — SODIUM CHLORIDE 1000 ML: 9 INJECTION, SOLUTION INTRAVENOUS at 02:10

## 2022-08-20 RX ADMIN — SODIUM CHLORIDE 1000 ML: 9 INJECTION, SOLUTION INTRAVENOUS at 01:08

## 2022-08-20 ASSESSMENT — ENCOUNTER SYMPTOMS
SHORTNESS OF BREATH: 0
NAUSEA: 0
VOMITING: 0
SORE THROAT: 0
DIARRHEA: 0
ABDOMINAL PAIN: 0

## 2022-08-20 ASSESSMENT — PAIN - FUNCTIONAL ASSESSMENT: PAIN_FUNCTIONAL_ASSESSMENT: NONE - DENIES PAIN

## 2022-08-20 NOTE — ED PROVIDER NOTES
[address] [facility] ED  [address] [facility] [address]  Phone: [phone]  Fax: Nicole Francisco [phone]          Pt Name: Mal Aschoff  MRN: [id number]  [place] 1995  Date of evaluation: 8/20/2022      CHIEF COMPLAINT       Chief Complaint   Patient presents with    Palpitations     Onset while driving home from work. HISTORY OF PRESENT ILLNESS       Mal Aschoff is a 32 y.o. male who presents with palpitations. States he drank 2 energy drinks throughout the day 1 at 6 PM and 1 at around 10 PM.  Each of them has about 300 mg of caffeine. Does not sound like this is terribly different from what the patient often has. Sounds like he had an ablation for PVCs in the past but nothing recently. No history of SVT according the patient. Denies any chest pain. No history of PE or DVT. No calf symptoms. No recent trauma. PERC criteria negative otherwise. No other symptoms or concerns at this time. REVIEW OF SYSTEMS       Review of Systems   Constitutional:  Negative for chills and fever. HENT:  Negative for sore throat. Respiratory:  Negative for shortness of breath. Cardiovascular:  Positive for palpitations. Negative for chest pain and leg swelling. Gastrointestinal:  Negative for abdominal pain, diarrhea, nausea and vomiting. Musculoskeletal:  Negative for neck pain. Skin:  Negative for rash. Neurological:  Negative for weakness and numbness. PAST MEDICAL HISTORY    has a past medical history of Asthma, Back pain, Drug-induced mood disorder ([address] Hypertension, PVC (premature ventricular contraction), Seasonal allergies, and Torn earlobe. SURGICAL HISTORY      has a past surgical history that includes External ear surgery and Ventricular ablation surgery (11/10/2017).     CURRENT MEDICATIONS       Discharge Medication List as of 8/20/2022  1:00 AM        CONTINUE these medications which have NOT CHANGED    Details PARoxetine (PAXIL) 20 MG tablet Take 2.5 tablets by mouth daily, Disp-80 tablet, R-0Normal      traZODone (DESYREL) 50 MG tablet Take 1 tablet by mouth nightly as needed for Depression, Disp-30 tablet, R-0Normal      cetirizine (ZYRTEC) 10 MG tablet Take 1 tablet by mouth daily, Disp-30 tablet, R-0Normal      ARIPiprazole (ABILIFY) 2 MG tablet Take 1 tablet by mouth daily, Disp-30 tablet, R-0Normal      amLODIPine (NORVASC) 10 MG tablet TAKE 1 TABLET BY MOUTH ONE TIME A DAY, Disp-30 tablet, R-0Normal      chlorthalidone (HYGROTEN) 50 MG tablet Take 1 tablet by mouth daily, Disp-30 tablet, R-0Normal      ibuprofen (ADVIL;MOTRIN) 800 MG tablet Take 800 mg by mouth every 6 hours as needed for PainHistorical Med             ALLERGIES     is allergic to cat hair extract. FAMILY HISTORY     He indicated that his mother is alive. He indicated that his father is alive. He indicated that the status of his maternal grandfather is unknown. He indicated that the status of his paternal grandfather is unknown.     family history includes Alcohol Abuse in his father; Asthma in his mother; Coronary Art Dis in his maternal grandfather and paternal grandfather; Diabetes in his father; Heart Disease in his father and mother; High Blood Pressure in his father. SOCIAL HISTORY      reports that he quit smoking about 6 years ago. His smoking use included cigarettes. He started smoking about 9 years ago. He has a 1.00 pack-year smoking history. His smokeless tobacco use includes snuff and chew. He reports current alcohol use. He reports that he does not currently use drugs after having used the following drugs: Cocaine, Marijuana (Jeri Leaver), Opiates , and Other-see comments. PHYSICAL EXAM     INITIAL VITALS:  height is 5' 10\" (1.778 m) and weight is 104.3 kg (230 lb). His oral temperature is 98.9 °F (37.2 °C). His blood pressure is 143/81 (abnormal) and his pulse is 97. His respiration is 18 and oxygen saturation is 98%. Physical Exam  Constitutional:       General: He is not in acute distress. Appearance: He is well-developed. HENT:      Head: Normocephalic and atraumatic. Right Ear: External ear normal.      Left Ear: External ear normal.   Eyes:      General: Lids are normal.         Right eye: No discharge. Left eye: No discharge. Neck:      Trachea: No tracheal deviation. Cardiovascular:      Rate and Rhythm: Normal rate and regular rhythm. Pulmonary:      Effort: Pulmonary effort is normal.      Breath sounds: Normal breath sounds. Abdominal:      Palpations: Abdomen is soft. Tenderness: There is no abdominal tenderness. Skin:     General: Skin is warm and dry. Neurological:      Mental Status: He is alert. GCS: GCS eye subscore is 4. GCS verbal subscore is 5. GCS motor subscore is 6. Psychiatric:         Behavior: Behavior normal.         DIFFERENTIAL DIAGNOSIS/ MDM:     Plan at this time will be to initiate further cardiopulmonary work-up. He appears well clinically    DIAGNOSTIC RESULTS     EKG: All EKG's are interpreted by the Emergency Department Physician who either signs or Co-signs this chart in the absence of a cardiologist.    Interpreted by No att. providers found     Rhythm: sinus   Rate: 149  Axis: normal  Ectopy: none  Conduction: sinus tachycardia. Otherwise normal conduction. ST Segments: no acute change  T Waves: no acute change    Clinical Impression: Sinus tachycardia with no other acute changes. EKNonspecificG. No acute infarction/ischemia noted. RADIOLOGY:   Interpretation per the Radiologist below, if available at the time of this note:  XR CHEST PORTABLE   Final Result   No acute process. No results found.     LABS:  Results for orders placed or performed during the hospital encounter of 08/20/22   Troponin   Result Value Ref Range    Troponin, High Sensitivity <6 0 - 22 ng/L   Comprehensive Metabolic Panel   Result Value Ref Range Glucose 111 (H) 70 - 99 mg/dL    BUN 12 6 - 20 mg/dL    Creatinine 1.23 (H) 0.70 - 1.20 mg/dL    Calcium 10.1 8.6 - 10.4 mg/dL    Sodium 141 135 - 144 mmol/L    Potassium 3.4 (L) 3.7 - 5.3 mmol/L    Chloride 103 98 - 107 mmol/L    CO2 25 20 - 31 mmol/L    Anion Gap 13 9 - 17 mmol/L    Alkaline Phosphatase 71 40 - 129 U/L    ALT 30 5 - 41 U/L    AST 16 <40 U/L    Total Bilirubin 0.31 0.3 - 1.2 mg/dL    Total Protein 7.9 6.4 - 8.3 g/dL    Albumin 5.3 (H) 3.5 - 5.2 g/dL    Albumin/Globulin Ratio 2.0 1.0 - 2.5    GFR Non-African American >60 >60 mL/min    GFR African American >60 >60 mL/min    GFR Comment         CBC with Auto Differential   Result Value Ref Range    WBC 10.9 3.5 - 11.0 k/uL    RBC 5.91 (H) 4.5 - 5.9 m/uL    Hemoglobin 16.8 13.5 - 17.5 g/dL    Hematocrit 47.2 41 - 53 %    MCV 79.9 (L) 80 - 100 fL    MCH 28.4 26 - 34 pg    MCHC 35.6 31 - 37 g/dL    RDW 13.3 12.5 - 15.4 %    Platelets 077 252 - 993 k/uL    MPV 11.7 8.0 - 14.0 fL    Seg Neutrophils 50 36 - 66 %    Lymphocytes 31 24 - 44 %    Monocytes 11 2 - 11 %    Eosinophils % 7 (H) 1 - 4 %    Basophils 1 0 - 2 %    Segs Absolute 5.53 1.8 - 7.7 k/uL    Absolute Lymph # 3.36 1.0 - 4.8 k/uL    Absolute Mono # 1.15 0.1 - 1.2 k/uL    Absolute Eos # 0.81 (H) 0.0 - 0.4 k/uL    Basophils Absolute 0.08 0.0 - 0.2 k/uL   Lipase   Result Value Ref Range    Lipase 27 13 - 60 U/L   Magnesium   Result Value Ref Range    Magnesium 2.0 1.6 - 2.6 mg/dL   D-Dimer, Quantitative   Result Value Ref Range    D-Dimer, Quant 0.23 mg/L FEU       EMERGENCY DEPARTMENT COURSE:     The patient was given the following medications:  Orders Placed This Encounter   Medications    0.9 % sodium chloride bolus    0.9 % sodium chloride bolus        Vitals:    Vitals:    08/20/22 0054 08/20/22 0156 08/20/22 0211 08/20/22 0226   BP: (!) 141/91 (!) 150/94 (!) 149/63 (!) 143/81   Pulse: (!) 154 (!) 105 82 97   Resp: 18      Temp: 98.9 °F (37.2 °C)      TempSrc: Oral      SpO2: 100% 98% 99% 98% Weight: 104.3 kg (230 lb)      Height: 5' 10\" (1.778 m)        -------------------------  BP: (!) 143/81, Temp: 98.9 °F (37.2 °C), Heart Rate: 97, Resp: 18      Re-evaluation Notes    Patient doing well on reevaluation. Asymptomatic. Cardiac enzyme negative. I feel this is secondary to some mild dehydration seen his slightly elevated creatinine. 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 Also in conjunction with the caffeine overload. I feel he is appropriate for discharge and outpatient follow-up. D-dimer negative. I feel he does not have a DVT or significant cardiac disease at this time. Advised to follow-up or return sooner if worsening. The patient presents with palpitations that is not suggesting in nature of pulmonary embolus, aortic dissection, cardiac ischemia, or other serious etiology. I considered an aortic dissection, but this is unlikely as patient is not complaining of tearing or ripping chest pain that is radiating to the back, the patient has no new neurological abnormalities and pulses are equal to all extremities. Mediastinum is within normal limits. Patient appears comfortable on physical exam and is not in distress. I also thought about a cardiac tamponade, but this is unlikely as patient is hemodynamically stable. Heart sounds are not distant, EKG does not show signs of electrical alternans and there is no JVD. I also thought about a tension pneumothorax, but this is unlikely given bilateral breath sounds and no signs of hemodynamic instability. I do not feel the patient has a PE. No clinical evidence of DVT. I thought about an esophageal perforation, but history and physical exam does not suggest vomiting, followed by chest pain. No signs of Hamman's crunch on physical exam; again, patient appears comfortable and is well appearing and non toxic. The patient has been instructed to return if the symptpoms change or worsen in any way. Given the extremely low risk of these diagnoses further testing and evaluation for these possibilites are not indicated at this time. The patient appears stable for discharge and has been instructed to return immediately if the symptoms worsen in any way, or in 8-12 hr if not improved for re-evaluation. We also discussed returning to the Emergency Department immediately if new or worsening symptoms occur. We have discussed the symptoms which are most concerning (e.g., worsening pain, shortness of breath, a feeling of passing out, fever, any neurologic symptoms, abdominal pain or vomiting) that necessitate immediate return. The patient understands that at this time there is no evidence for a more malignant underlying process, but the patient also understands that early in the process of an illness or injury, an emergency department workup can be falsely reassuring. Routine discharge counseling was given, and the patient understands that worsening, changing or persistent symptoms should prompt an immediate call or follow up with their primary physician or return to the emergency department. The importance of appropriate follow up was also discussed. I have reviewed the disposition diagnosis with the patient and or their family/guardian. I have answered their questions and given discharge instructions. They voiced understanding of these instructions and did not have any further questions or complaints. CONSULTS:    None    CRITICAL CARE:     None    PROCEDURES:    None    FINAL IMPRESSION      1. Palpitations    2. Sinus tachycardia          DISPOSITION/PLAN   DISPOSITION Decision To Discharge 08/20/2022 04:02:47 AM      Condition on Disposition    Improved    PATIENT REFERRED TO:  DAVID Pritchard - CNP  [address]  [facility] [address]  [phone]    Schedule an appointment as soon as possible for a visit in 2 days      DISCHARGE MEDICATIONS:  Discharge Medication List as of 8/20/2022  1:00 AM          (Please note that portions of this note were completed with a voice recognition program.  Efforts were made to edit the dictations but occasionally words are mis-transcribed.)    Uma Couch DO, DO  Attending Emergency Physician       Uma Couch DO  08/20/22 1514

## 2022-08-22 LAB
EKG ATRIAL RATE: 149 BPM
EKG P AXIS: 33 DEGREES
EKG P-R INTERVAL: 122 MS
EKG Q-T INTERVAL: 288 MS
EKG QRS DURATION: 98 MS
EKG QTC CALCULATION (BAZETT): 453 MS
EKG R AXIS: 18 DEGREES
EKG T AXIS: 86 DEGREES
EKG VENTRICULAR RATE: 149 BPM

## 2022-08-24 ENCOUNTER — HOSPITAL ENCOUNTER (INPATIENT)
Age: 27
LOS: 4 days | Discharge: HOME | DRG: 885 | End: 2022-08-28
Attending: EMERGENCY MEDICINE | Admitting: PSYCHIATRY & NEUROLOGY
Payer: COMMERCIAL

## 2022-08-24 DIAGNOSIS — F32.A DEPRESSION, UNSPECIFIED DEPRESSION TYPE: ICD-10-CM

## 2022-08-24 DIAGNOSIS — F32.A DEPRESSION WITH SUICIDAL IDEATION: Primary | ICD-10-CM

## 2022-08-24 DIAGNOSIS — R45.851 DEPRESSION WITH SUICIDAL IDEATION: Primary | ICD-10-CM

## 2022-08-24 LAB
ALBUMIN SERPL-MCNC: 4.6 G/DL (ref 3.5–5.2)
ALP BLD-CCNC: 65 U/L (ref 40–129)
ALT SERPL-CCNC: 26 U/L (ref 5–41)
AMPHETAMINE SCREEN URINE: NEGATIVE
ANION GAP SERPL CALCULATED.3IONS-SCNC: 14 MMOL/L (ref 9–17)
AST SERPL-CCNC: 15 U/L
BARBITURATE SCREEN URINE: NEGATIVE
BENZODIAZEPINE SCREEN, URINE: NEGATIVE
BILIRUB SERPL-MCNC: 0.28 MG/DL (ref 0.3–1.2)
BUN BLDV-MCNC: 11 MG/DL (ref 6–20)
CALCIUM SERPL-MCNC: 9.7 MG/DL (ref 8.6–10.4)
CANNABINOID SCREEN URINE: NEGATIVE
CHLORIDE BLD-SCNC: 105 MMOL/L (ref 98–107)
CO2: 22 MMOL/L (ref 20–31)
COCAINE METABOLITE, URINE: NEGATIVE
CREAT SERPL-MCNC: 0.7 MG/DL (ref 0.7–1.2)
ETHANOL PERCENT: 0.01 %
ETHANOL: 10 MG/DL
FENTANYL URINE: NEGATIVE
GFR AFRICAN AMERICAN: >60 ML/MIN
GFR NON-AFRICAN AMERICAN: >60 ML/MIN
GFR SERPL CREATININE-BSD FRML MDRD: ABNORMAL ML/MIN/{1.73_M2}
GLUCOSE BLD-MCNC: 92 MG/DL (ref 70–99)
METHADONE SCREEN, URINE: NEGATIVE
OPIATES, URINE: NEGATIVE
OXYCODONE SCREEN URINE: NEGATIVE
PHENCYCLIDINE, URINE: NEGATIVE
POTASSIUM SERPL-SCNC: 3.7 MMOL/L (ref 3.7–5.3)
SODIUM BLD-SCNC: 141 MMOL/L (ref 135–144)
TEST INFORMATION: NORMAL
TOTAL PROTEIN: 7.3 G/DL (ref 6.4–8.3)

## 2022-08-24 PROCEDURE — 6370000000 HC RX 637 (ALT 250 FOR IP): Performed by: PSYCHIATRY & NEUROLOGY

## 2022-08-24 PROCEDURE — 99285 EMERGENCY DEPT VISIT HI MDM: CPT

## 2022-08-24 PROCEDURE — 36415 COLL VENOUS BLD VENIPUNCTURE: CPT

## 2022-08-24 PROCEDURE — G0480 DRUG TEST DEF 1-7 CLASSES: HCPCS

## 2022-08-24 PROCEDURE — 80053 COMPREHEN METABOLIC PANEL: CPT

## 2022-08-24 PROCEDURE — 80307 DRUG TEST PRSMV CHEM ANLYZR: CPT

## 2022-08-24 PROCEDURE — 1240000000 HC EMOTIONAL WELLNESS R&B

## 2022-08-24 RX ORDER — ARIPIPRAZOLE 5 MG/1
5 TABLET ORAL DAILY
Status: DISCONTINUED | OUTPATIENT
Start: 2022-08-25 | End: 2022-08-28 | Stop reason: HOSPADM

## 2022-08-24 RX ORDER — MAGNESIUM HYDROXIDE/ALUMINUM HYDROXICE/SIMETHICONE 120; 1200; 1200 MG/30ML; MG/30ML; MG/30ML
30 SUSPENSION ORAL EVERY 6 HOURS PRN
Status: DISCONTINUED | OUTPATIENT
Start: 2022-08-24 | End: 2022-08-28 | Stop reason: HOSPADM

## 2022-08-24 RX ORDER — PAROXETINE HYDROCHLORIDE 20 MG/1
20 TABLET, FILM COATED ORAL EVERY MORNING
Status: DISCONTINUED | OUTPATIENT
Start: 2022-08-25 | End: 2022-08-28 | Stop reason: HOSPADM

## 2022-08-24 RX ORDER — CHLORTHALIDONE 25 MG/1
50 TABLET ORAL DAILY
Status: DISCONTINUED | OUTPATIENT
Start: 2022-08-25 | End: 2022-08-28 | Stop reason: HOSPADM

## 2022-08-24 RX ORDER — HALOPERIDOL 5 MG/ML
5 INJECTION INTRAMUSCULAR EVERY 6 HOURS PRN
Status: DISCONTINUED | OUTPATIENT
Start: 2022-08-24 | End: 2022-08-28 | Stop reason: HOSPADM

## 2022-08-24 RX ORDER — ACETAMINOPHEN 325 MG/1
650 TABLET ORAL EVERY 6 HOURS PRN
Status: DISCONTINUED | OUTPATIENT
Start: 2022-08-24 | End: 2022-08-28 | Stop reason: HOSPADM

## 2022-08-24 RX ORDER — POLYETHYLENE GLYCOL 3350 17 G/17G
17 POWDER, FOR SOLUTION ORAL DAILY PRN
Status: DISCONTINUED | OUTPATIENT
Start: 2022-08-24 | End: 2022-08-28 | Stop reason: HOSPADM

## 2022-08-24 RX ORDER — ARIPIPRAZOLE 5 MG/1
5 TABLET ORAL DAILY
Status: ON HOLD | COMMUNITY
End: 2022-08-28 | Stop reason: SDUPTHER

## 2022-08-24 RX ORDER — DIPHENHYDRAMINE HYDROCHLORIDE 50 MG/ML
50 INJECTION INTRAMUSCULAR; INTRAVENOUS EVERY 6 HOURS PRN
Status: DISCONTINUED | OUTPATIENT
Start: 2022-08-24 | End: 2022-08-28 | Stop reason: HOSPADM

## 2022-08-24 RX ORDER — HYDROXYZINE 50 MG/1
50 TABLET, FILM COATED ORAL 3 TIMES DAILY PRN
Status: DISCONTINUED | OUTPATIENT
Start: 2022-08-24 | End: 2022-08-28 | Stop reason: HOSPADM

## 2022-08-24 RX ORDER — PAROXETINE HYDROCHLORIDE 20 MG/1
20 TABLET, FILM COATED ORAL EVERY MORNING
Status: ON HOLD | COMMUNITY
End: 2022-08-28 | Stop reason: SDUPTHER

## 2022-08-24 RX ORDER — IBUPROFEN 400 MG/1
400 TABLET ORAL EVERY 6 HOURS PRN
Status: DISCONTINUED | OUTPATIENT
Start: 2022-08-24 | End: 2022-08-28 | Stop reason: HOSPADM

## 2022-08-24 RX ORDER — AMLODIPINE BESYLATE 10 MG/1
10 TABLET ORAL DAILY
Status: DISCONTINUED | OUTPATIENT
Start: 2022-08-25 | End: 2022-08-28 | Stop reason: HOSPADM

## 2022-08-24 RX ORDER — CETIRIZINE HYDROCHLORIDE 10 MG/1
10 TABLET ORAL DAILY
Status: DISCONTINUED | OUTPATIENT
Start: 2022-08-25 | End: 2022-08-28 | Stop reason: HOSPADM

## 2022-08-24 RX ORDER — TRAZODONE HYDROCHLORIDE 50 MG/1
50 TABLET ORAL NIGHTLY PRN
Status: DISCONTINUED | OUTPATIENT
Start: 2022-08-24 | End: 2022-08-28 | Stop reason: HOSPADM

## 2022-08-24 RX ORDER — LORAZEPAM 2 MG/ML
2 INJECTION INTRAMUSCULAR EVERY 6 HOURS PRN
Status: DISCONTINUED | OUTPATIENT
Start: 2022-08-24 | End: 2022-08-28 | Stop reason: HOSPADM

## 2022-08-24 RX ADMIN — HYDROXYZINE HYDROCHLORIDE 50 MG: 50 TABLET, FILM COATED ORAL at 17:43

## 2022-08-24 RX ADMIN — TRAZODONE HYDROCHLORIDE 50 MG: 50 TABLET ORAL at 21:31

## 2022-08-24 RX ADMIN — HYDROXYZINE HYDROCHLORIDE 50 MG: 50 TABLET, FILM COATED ORAL at 21:31

## 2022-08-24 ASSESSMENT — ENCOUNTER SYMPTOMS
EYE REDNESS: 0
BLOOD IN STOOL: 0
FACIAL SWELLING: 0
WHEEZING: 0
EYE PAIN: 0
RHINORRHEA: 0
VOMITING: 0
COLOR CHANGE: 0
TROUBLE SWALLOWING: 0
COUGH: 0
NAUSEA: 0
ABDOMINAL PAIN: 0
SHORTNESS OF BREATH: 0
DIARRHEA: 0
BACK PAIN: 0
EYE DISCHARGE: 0
SINUS PRESSURE: 0
CONSTIPATION: 0
SORE THROAT: 0
CHEST TIGHTNESS: 0

## 2022-08-24 ASSESSMENT — SLEEP AND FATIGUE QUESTIONNAIRES
DO YOU USE A SLEEP AID: NO
SLEEP PATTERN: DIFFICULTY FALLING ASLEEP;DISTURBED/INTERRUPTED SLEEP
DO YOU HAVE DIFFICULTY SLEEPING: YES
AVERAGE NUMBER OF SLEEP HOURS: 4

## 2022-08-24 ASSESSMENT — LIFESTYLE VARIABLES
HOW OFTEN DO YOU HAVE A DRINK CONTAINING ALCOHOL: 4 OR MORE TIMES A WEEK
HOW MANY STANDARD DRINKS CONTAINING ALCOHOL DO YOU HAVE ON A TYPICAL DAY: 3 OR 4

## 2022-08-24 ASSESSMENT — PATIENT HEALTH QUESTIONNAIRE - PHQ9: SUM OF ALL RESPONSES TO PHQ QUESTIONS 1-9: 15

## 2022-08-24 ASSESSMENT — PAIN - FUNCTIONAL ASSESSMENT: PAIN_FUNCTIONAL_ASSESSMENT: NONE - DENIES PAIN

## 2022-08-24 NOTE — BH NOTE
Patient given tobacco quitline number 6-572-988-6876 at this time, refusing to call at this time, states \" I just dont want to quit now\"- patient given information as to the dangers of long term tobacco use. Continue to reinforce the importance of tobacco cessation.

## 2022-08-24 NOTE — ED PROVIDER NOTES
[address]  EMERGENCY DEPARTMENT ENCOUNTER      Pt Name: Stanislav Sung  MRN: [id number]  [place] 1995  Date of evaluation: 8/24/22      CHIEF COMPLAINT       Chief Complaint   Patient presents with    Suicidal     PT to ED for SI without a specific plan \"any way I could\"  Has not been taking meds for about a month. Had a prior suicide attempt several years ago. HISTORY OF PRESENT ILLNESS    Stanislav Sung is a 32 y.o. male who presents complaining of Psychiatric Evaluation. Patient is here stating that he has been feeling depressed and started having suicidal ideation. Patient states he has been off his meds for over a month now because he has not had them because he has been following with his doctor. Patient states that he does have suicidal ideation no definitive plan but would take about anything that would take to get rid of his life. Patient has had prior attempts and hospitalizations. Patient does admit to having a little alcohol before coming in today. No other drugs. No hallucinations. Patient was having some chest pain issues a few days ago was seen for that and evaluated and he states he has been doing okay since. REVIEW OF SYSTEMS       Review of Systems   Constitutional:  Negative for activity change, appetite change, chills, diaphoresis and fever. HENT:  Negative for congestion, ear pain, facial swelling, nosebleeds, rhinorrhea, sinus pressure, sore throat and trouble swallowing. Eyes:  Negative for pain, discharge and redness. Respiratory:  Negative for cough, chest tightness, shortness of breath and wheezing. Cardiovascular:  Negative for chest pain, palpitations and leg swelling. Gastrointestinal:  Negative for abdominal pain, blood in stool, constipation, diarrhea, nausea and vomiting. Genitourinary:  Negative for difficulty urinating, dysuria, flank pain, frequency, genital sores and hematuria.    Musculoskeletal:  Negative for arthralgias, back pain, gait problem, joint swelling, myalgias and neck pain. Skin:  Negative for color change, pallor, rash and wound. Neurological:  Negative for dizziness, tremors, seizures, syncope, speech difficulty, weakness, numbness and headaches. Psychiatric/Behavioral:  Positive for dysphoric mood and suicidal ideas. Negative for confusion, decreased concentration, hallucinations, self-injury and sleep disturbance. PAST MEDICAL HISTORY     Past Medical History:   Diagnosis Date    Asthma     Back pain     Drug-induced mood disorder (HCC)     Hypertension     PVC (premature ventricular contraction)     Seasonal allergies     Torn earlobe 2014    bilateral gauged earlobe       SURGICAL HISTORY       Past Surgical History:   Procedure Laterality Date    EXTERNAL EAR SURGERY      VENTRICULAR ABLATION SURGERY  11/10/2017       CURRENT MEDICATIONS       Previous Medications    AMLODIPINE (NORVASC) 10 MG TABLET    TAKE 1 TABLET BY MOUTH ONE TIME A DAY    ARIPIPRAZOLE (ABILIFY) 2 MG TABLET    Take 1 tablet by mouth daily    CETIRIZINE (ZYRTEC) 10 MG TABLET    Take 1 tablet by mouth daily    CHLORTHALIDONE (HYGROTEN) 50 MG TABLET    Take 1 tablet by mouth daily    IBUPROFEN (ADVIL;MOTRIN) 800 MG TABLET    Take 800 mg by mouth every 6 hours as needed for Pain    PAROXETINE (PAXIL) 20 MG TABLET    Take 2.5 tablets by mouth daily    TRAZODONE (DESYREL) 50 MG TABLET    Take 1 tablet by mouth nightly as needed for Depression       ALLERGIES     is allergic to cat hair extract. SOCIAL HISTORY      reports that he quit smoking about 6 years ago. His smoking use included cigarettes. He started smoking about 9 years ago. He has a 1.00 pack-year smoking history. His smokeless tobacco use includes snuff and chew. He reports current alcohol use. He reports that he does not currently use drugs after having used the following drugs: Cocaine, Marijuana (Carliss Stair), Opiates , and Other-see comments.     PHYSICAL EXAM     INITIAL VITALS: /71   Pulse 83   Temp 98 °F (36.7 °C) (Oral)   Resp 18   Ht 6' (1.829 m)   Wt 270 lb (122.5 kg)   SpO2 97%   BMI 36.62 kg/m²      Physical Exam  Constitutional:       General: He is not in acute distress. Appearance: He is well-developed. He is not diaphoretic. HENT:      Head: Normocephalic and atraumatic. Eyes:      General: No scleral icterus. Right eye: No discharge. Left eye: No discharge. Conjunctiva/sclera: Conjunctivae normal.      Pupils: Pupils are equal, round, and reactive to light. Cardiovascular:      Rate and Rhythm: Normal rate and regular rhythm. Heart sounds: Normal heart sounds. No murmur heard. No friction rub. No gallop. Pulmonary:      Effort: Pulmonary effort is normal. No respiratory distress. Breath sounds: Normal breath sounds. No wheezing or rales. Neurological:      Mental Status: He is alert and oriented to person, place, and time. Psychiatric:         Mood and Affect: Mood is depressed. Speech: Speech normal.         Behavior: Behavior is withdrawn. Thought Content: Thought content includes suicidal ideation. Thought content does not include homicidal ideation. Thought content includes suicidal plan. DIAGNOSTIC RESULTS     LABS: All lab results were reviewed by myself, and all abnormals are listed below. Labs Reviewed   ETHANOL - Abnormal; Notable for the following components:       Result Value    Ethanol 10 (*)     All other components within normal limits   COMPREHENSIVE METABOLIC PANEL - Abnormal; Notable for the following components: Total Bilirubin 0.28 (*)     All other components within normal limits   URINE DRUG SCREEN         MEDICAL DECISION MAKING:     We will do an alcohol level since he admits to drinking before coming in to do a drug screen otherwise his labs from a few days ago were unremarkable and is okay to be medically cleared for that.       EMERGENCY DEPARTMENT COURSE: Vitals:    Vitals:    08/24/22 1412   BP: 122/71   Pulse: 83   Resp: 18   Temp: 98 °F (36.7 °C)   TempSrc: Oral   SpO2: 97%   Weight: 270 lb (122.5 kg)   Height: 6' (1.829 m)       The patient was given the following medications while in the emergency department:  No orders of the defined types were placed in this encounter. -------------------------  4:14 PM EDT  Patient is medically cleared. Patient has been evaluated and will be admitted to [facility] for further psychiatric evaluation and treatment. FINAL IMPRESSION      1. Depression with suicidal ideation          DISPOSITION/PLAN   DISPOSITION Decision To Admit 08/24/2022 03:41:52 PM      PATIENT REFERREDTO:  No follow-up provider specified.     DISCHARGEMEDICATIONS:  New Prescriptions    No medications on file       (Please note that portions of this note were completed with a voice recognition program.  Efforts were made to edit thedictations but occasionally words are mis-transcribed.)    Steffanie Flores MD  Attending Emergency Physician                      Steffanie Flores MD  08/24/22 [id number]

## 2022-08-24 NOTE — BH NOTE
[facility]  Admission Note     Admission Type:   Admission Type: Voluntary    Reason for admission:  Reason for Admission: Patient was admitted for suicidal ideations with no specific plan. Patient reports that he has been off his medication for a month. Patient states the past couple of weeks he has been depressed and stated \"I feel like a failure\". Patient reports that he has a past attempt of suicide with a plan by hanging. Addictive Behavior:   Addictive Behavior  In the Past 3 Months, Have You Felt or Has Someone Told You That You Have a Problem With  : None    Medical Problems:   Past Medical History:   Diagnosis Date    Asthma     Back pain     Drug-induced mood disorder (HCC)     Hypertension     PVC (premature ventricular contraction)     Seasonal allergies     Torn earlobe 2014    bilateral gauged earlobe       Status EXAM:  Mental Status and Behavioral Exam  Normal: No  Level of Assistance: Independent/Self  Facial Expression: Flat  Affect: Blunt  Level of Consciousness: Alert  Frequency of Checks: 4 times per hour, close  Mood:Normal: No  Mood: Anxious, Depressed, Helpless  Motor Activity:Normal: Yes  Eye Contact: Fair  Observed Behavior: Cooperative, Preoccupied  Sexual Misconduct History: Current - no  Preception: Joshua to person, Joshua to time, Joshua to place, Joshua to situation  Attention:Normal: No  Attention: Distractible  Thought Processes: Other (comment) (linear, coherent)  Thought Content:Normal: No  Thought Content: Preoccupations  Depression Symptoms: Appetite change, Sleep disturbance, Feelings of hopelessess, Feelings of helplessness, Increased irritability, Impaired concentration  Anxiety Symptoms: Generalized  Luann Symptoms: No problems reported or observed.   Hallucinations: None  Delusions: No  Memory:Normal: Yes  Insight and Judgment: No  Insight and Judgment: Poor judgment, Poor insight    Tobacco Screening:  Practical Counseling, on admission, pankaj ABEL, if applicable and completed (first 3 are required if patient doesn't refuse): ( x) Recognizing danger situations (included triggers and roadblocks)                    (x ) Coping skills (new ways to manage stress,relaxation techniques, changing routine, distraction)                                                           ( x) Basic information about quitting (benefits of quitting, techniques in how to quit, available resources  ( ) Referral for counseling faxed to [facility]                                                                                                                   ( ) Patient refused counseling  ( ) Patient has not smoked in the last 30 days    Metabolic Screening:    Lab Results   Component Value Date    LABA1C 5.3 05/02/2021       No results found for: CHOL  No results found for: TRIG  Lab Results   Component Value Date    HDL 41 05/02/2021     No components found for: LDLCAL  No results found for: LABVLDL      Body mass index is 36.62 kg/m². BP Readings from Last 2 Encounters:   08/24/22 (!) 137/94   08/20/22 (!) 143/81           Pt admitted with followings belongings:  Dental Appliances: None  Vision - Corrective Lenses: None  Hearing Aid: None  Jewelry: None  Body Piercings Removed: No  Clothing: Pants, Shirt, Footwear, Shorts  Other Valuables:  Other (Comment) (Vape device, Metal cord ballard)    Twila Wilkinson RN

## 2022-08-24 NOTE — PROGRESS NOTES
Medication History completed:    New medications: none    Medications discontinued: trazodone, ibuprofen, chlorthalidone    Changes to dosing:   Paroxetine changed to 20 mg daily  Aripiprazole changed to 5 mg daily    Stated allergies: As listed    Other pertinent information: Medications confirmed with Demond Hare. The patient reports that he has been off all medications for about a month, however the patient filled amlodipine, aripiprazole, and paroxetine on 8/14/22.      Thank you,  Marielle Jones, PharmD, BCPS  [phone]

## 2022-08-24 NOTE — ED NOTES
Provisional Diagnosis:   Major Depressive Disorder    Psychosocial and Contextual Factors:   Patient reports worsening depression and states he has been off his psychotropic medications for the last month. C-SSRS Summary:      Patient: X  Family:   Agency:     Substance Abuse: Patient denies. Present Suicidal Behavior:  Patient reports current suicidal ideation and stating \"I am getting real close to killing myself\" and he would do \"anything to get me out of this life. Verbal:     Attempt:    Past Suicidal Behavior: Patient reports past attempt at hanging himself \"years ago. \"    Verbal:X    Attempt:X    Self-Injurious/Self-Mutilation:Patient denies. Violence Current or Past: Patient is calm and cooperative. Protective Factors:    Patient has housing and is employed. Risk Factors:    Patient reports poor coping skills. Clinical Summary:    Luciana Halsted is a 32 y.o. male who presents to the ED for suicidal ideation stating \"I am getting real close to killing myself. \"     Patient has been off his psychotropic medications for the last month. Patient denies definitive plan to kill himself but stated  he would do \"anything to get me out of this life. \" Patient reports daily feelings of hopelessness and worthlessness. Patient denies any recent trigger to depressive episode. Patient reports he has also not been going to therapy at [facility] for the last two months as well. Patient states he feels like \"a have failed my family completely at everything\" and states this has lead to arguments with him and his wife. Patient reports he has three children. Patient states he works as a [employer]  and states he recently started a place. Patient reports poor sleep every night, getting 2-3 hours per night. Patient states he is on probation for an [employer]. Patient does report occasional alcohol use. Patient displays no abnormal movements, speech rate, or tone.  Articulations were within normal limits. Patients memory was intact. Thought form was linear. Patient denies obsessions or compulsions. Patient denies homicidal ideation. Patient denies auditory and visual hallucinations. Patient does reports he has had manic episodes before, but denies any recent. Level of Care Disposition:    Writer consulted with Dr. Navin Flores who recommends inpatient psychiatric admission for safety and stabilization. Patient is in agreement and signed application for voluntary admission.

## 2022-08-24 NOTE — BH NOTE
On call provider, Dr. Jie Dominguez , notified of best practice advisory suggesting to place patient on suicide precautions.  Provider to discontinue the order as patient does not meet criteria for suicide precautions at this time

## 2022-08-25 PROBLEM — F33.2 MAJOR DEPRESSIVE DISORDER, RECURRENT SEVERE WITHOUT PSYCHOTIC FEATURES (HCC): Status: ACTIVE | Noted: 2022-08-25

## 2022-08-25 PROCEDURE — 6370000000 HC RX 637 (ALT 250 FOR IP): Performed by: PSYCHIATRY & NEUROLOGY

## 2022-08-25 PROCEDURE — 1240000000 HC EMOTIONAL WELLNESS R&B

## 2022-08-25 PROCEDURE — 99223 1ST HOSP IP/OBS HIGH 75: CPT | Performed by: INTERNAL MEDICINE

## 2022-08-25 PROCEDURE — 99223 1ST HOSP IP/OBS HIGH 75: CPT | Performed by: PSYCHIATRY & NEUROLOGY

## 2022-08-25 PROCEDURE — APPSS60 APP SPLIT SHARED TIME 46-60 MINUTES

## 2022-08-25 PROCEDURE — 93005 ELECTROCARDIOGRAM TRACING: CPT | Performed by: INTERNAL MEDICINE

## 2022-08-25 RX ADMIN — CHLORTHALIDONE 50 MG: 25 TABLET ORAL at 08:58

## 2022-08-25 RX ADMIN — HYDROXYZINE HYDROCHLORIDE 50 MG: 50 TABLET, FILM COATED ORAL at 21:43

## 2022-08-25 RX ADMIN — ARIPIPRAZOLE 5 MG: 5 TABLET ORAL at 08:58

## 2022-08-25 RX ADMIN — AMLODIPINE BESYLATE 10 MG: 10 TABLET ORAL at 08:58

## 2022-08-25 RX ADMIN — HYDROXYZINE HYDROCHLORIDE 50 MG: 50 TABLET, FILM COATED ORAL at 12:47

## 2022-08-25 RX ADMIN — TRAZODONE HYDROCHLORIDE 50 MG: 50 TABLET ORAL at 21:43

## 2022-08-25 RX ADMIN — PAROXETINE HYDROCHLORIDE 20 MG: 20 TABLET, FILM COATED ORAL at 08:58

## 2022-08-25 RX ADMIN — CETIRIZINE HYDROCHLORIDE 10 MG: 10 TABLET, FILM COATED ORAL at 08:58

## 2022-08-25 NOTE — H&P
Department of Psychiatry  Attending Physician Psychiatric Assessment     Reason for Admission to Psychiatric Unit:  Concerns about patient's safety in the community    CHIEF COMPLAINT:  Depression with suicidal ideation    History obtained from: Patient, electronic medical record          HISTORY OF PRESENT ILLNESS:    Krysta Pressley is a 32 y.o. male who has a past medical history of mental illness, alcohol use disorder, hypertension, PVCs, and cardiomyopathy who presents to the ER with increased depression and suicidal ideation with a plan stating \"I would do anything to get out of this life. \"  Prakash Ty is agreeable to assessment at bedside. He is somewhat evasive and although he is cooperative with interview he does not open up easily to this writer. When asked about events leading up to hospitalization patient reports that his depression has significantly increased over the past couple of months. He states that he has been noncompliant with his medication and states that he needs to get back on it. When asked about significant stressors in patient's life patient states \"oh yeah I have a lot going on. \"  When asked to elaborate further on this however patient is evasive. He does report that he is having financial issues and relationship issues with his current fiancé however would not go into further detail. \"  He reports that for the past month he has been feeling down and depressed all day, nearly every day. He endorses poor sleep stating that he struggles to fall asleep at night. He endorses significant anhedonia, poor energy and motivation, and decreased concentration. He denies any issues with his appetite. He does endorse significant feelings of hopelessness and helplessness. Prakash Ty endorses suicidal ideation however again is evasive and will not disclose his plan to this writer. He denies homicidal ideation.   At this time Prakash Ty feels that he would be extremely unsafe out of the hospital at this time for fear that he may act on his suicidal thoughts. Tim Hernandez denies ever having a time in his past where he is gone 3 or more days without sleep and felt increased energy. He denies grandiose thoughts of himself or an increase in goal-directed activity. He denies symptoms of psychosis including auditory and visual hallucinations. He denies paranoia. Tim Hernandez endorses excess worry and states that he feels he worries too much. He reports that he often feels restless and on edge. He endorses muscle tension from being keyed up often as well as poor sleep due to anxiety. He denies a history of panic attacks. At this time patient is denying any obsessive thoughts or behaviors however at previous admission patient did endorse intrusive persistent thoughts of something bad happening to his family. He continues to deny this today. Patient is also denying any history of trauma throughout his childhood. He denies symptoms of PTSD including nightmares and vivid flashbacks. Patient does endorse symptoms consistent with cluster B personality disorder. He reports that his self-esteem is \"terrible\" and he states that he often feels chronically empty inside with a hole in his heart that cannot be filled by anything. He states that he often fears rejection from loved ones as well as has a history of self harming behavior by burning his skin. He endorses mood swings throughout the day with intense anger outbursts. Tim Hernandez denies current illicit drug use however does have a history of opioid dependence but has been sober for many years. He reports that he drinks every other day however denies that his drinking is a problem and states that he only has \"2 beers\" every other day. Patient is currently on probation for an [employer]. He did admit to drinking prior to coming to the hospital however his alcohol level was only 0.01.          History of head trauma: [] Yes [x] No    History of seizures: [] Yes [x] No    History of violence or aggression: [] Yes [x] No         PSYCHIATRIC HISTORY:  [x] Yes [] No    Currently follows with [facility]. 1 previous lifetime suicide attempts. 2 previous psychiatric hospital admissions. Last admission to this facility was 2022    Past psychiatric medications includes:   Abilify, Paxil, trazodone, Prozac, Zoloft, Lexapro, Celexa, Effexor, Wellbutrin, Seroquel    Medication Compliance: No  Reports that combination of Abilify and Paxil have been most beneficial    Adverse reactions from psychotropic medications: [] Yes [x] No         Lifetime Psychiatric Review of Systems         Depression: Endorses     Anxiety: Endorses     Panic Attacks: Denies     Luann or Hypomania: Denies     Phobias: Denies     Obsessions and Compulsions: Denies     Visual Hallucinations: Denies     Auditory Hallucinations: Denies     Delusions: Denies     Paranoia: Denies     PTSD: Denies    Past Medical History:        Diagnosis Date    Asthma     Back pain     Drug-induced mood disorder ([address]     Hypertension     PVC (premature ventricular contraction)     Seasonal allergies     Torn earlobe     bilateral gauged earlobe       Past Surgical History:        Procedure Laterality Date    EXTERNAL EAR SURGERY      VENTRICULAR ABLATION SURGERY  11/10/2017       Allergies:  Cat hair extract         Social History:     Born in: [place] however came to [facility] around the age of 6  Family: Reports being raised by his mother. No relationship with his father. Reports his father is  and states Lazaro Davis was a piece of shit. \"  Has 3 brothers however is not close with them  Highest Level of Education: High school graduate  Occupation: Works as a   Marital Status: Currently engaged  Children: Has 3 children  Residence: Lives with his fiance and 3 children  Stressors: Relationship issues, financial issues, untreated mental illness  Patient Assets/Supportive Factors: Patient is seeking additional support         DRUG USE HISTORY  Social History     Tobacco Use   Smoking Status Former    Packs/day: 1.00    Years: 1.00    Pack years: 1.00    Types: Cigarettes    Start date: 2013    Quit date: 2016    Years since quittin.1   Smokeless Tobacco Current    Types: Snuff, Chew   Tobacco Comments    Discussed dental care for oral cancer detection and prevention. Discussed risks for tobacco / nicotine. Social History     Substance and Sexual Activity   Alcohol Use Yes    Alcohol/week: 0.0 standard drinks    Comment: occasional     Social History     Substance and Sexual Activity   Drug Use Not Currently    Types: Cocaine, Marijuana ([facility] [employer]), Opiates , Other-see comments    Comment: No longer uses. Hx of heroin use       Radha Zaman denies current illicit drug use however does have a history of opioid dependence but has been sober for many years. He reports that he drinks every other day however denies that his drinking is a problem and states that he only has \"2 beers\" every other day. Patient is currently on probation for an [employer]. He did admit to drinking prior to coming to the hospital however his alcohol level was only 0.01. LEGAL HISTORY:   HISTORY OF INCARCERATION: [] Yes [x] No  Is currently on probation for LENNY    Family History:       Problem Relation Age of Onset    Heart Disease Father     High Blood Pressure Father     Diabetes Father     Alcohol Abuse Father     Asthma Mother     Heart Disease Mother     Coronary Art Dis Maternal Grandfather     Coronary Art Dis Paternal Grandfather        Psychiatric Family History    Patient endorses psychiatric family history.      Suicides in family: [] Yes [x] No    Substance use in family: [x] Yes [] No         PHYSICAL EXAM:  Vitals:  BP (!) 112/50   Pulse (!) 47   Temp 97.5 °F (36.4 °C) (Oral)   Resp 14   Ht 6' (1.829 m)   Wt 270 lb (122.5 kg)   SpO2 97%   BMI 36.62 kg/m²     Pain Level: Denies    LABS:  Labs reviewed: [x] Yes  Last EKG in EMR reviewed: [x] Yes  QTc: 453          Review of Systems   Constitutional: Negative for chills and weight loss. HENT: Negative for ear pain and nosebleeds. Eyes: Negative for blurred vision and photophobia. Respiratory: Negative for cough, shortness of breath and wheezing. Cardiovascular: Negative for chest pain and palpitations. Gastrointestinal: Negative for abdominal pain, diarrhea and vomiting. Genitourinary: Negative for dysuria and urgency. Musculoskeletal: Negative for falls and joint pain. Skin: Negative for itching and rash. Neurological: Negative for tremors, seizures and weakness. Endo/Heme/Allergies: Does not bruise/bleed easily. Physical Exam:   Constitutional:  Appears well-developed and well-nourished, no acute distress. HENT:   Head: Normocephalic and atraumatic. Eyes: Conjunctivae are normal. Right eye exhibits no discharge. Left eye exhibits no discharge. No scleral icterus. Neck: Normal range of motion. Neck supple. Pulmonary/Chest:  No respiratory distress or accessory muscle use, no wheezing. Cardiac: Regular rate and rhythm. Abdominal: Soft. Non-tender. Exhibits no distension. Musculoskeletal: Normal range of motion. Exhibits no edema. Neurological: cranial nerves II-XII grossly in tact, normal gait and station. Skin: Skin is warm and dry. Patient is not diaphoretic. No erythema. Mental Status Examination:    Level of consciousness: Awake and alert  Appearance:  Appropriate attire, resting in bed, fair grooming   Behavior/Motor: Withdrawn, evasive  Attitude toward examiner:  Cooperative, attentive, poor eye contact  Speech: Normal rate, volume, and depressed tone. Mood: Depressed  Affect: Mood-congruent, flat  Thought processes: Linear and coherent  Thought content: Active suicidal ideations, with a  current plan or intent, contracts for safety on the unit. Denies homicidal ideations               Denies visual hallucinations.  Denies auditory hallucinations. Denies delusions              Denies paranoia  Cognition:  Oriented to self, location, time, situation  Concentration: Clinically adequate  Memory: Intact  Insight &Judgment: Poor         DSM-5 Diagnosis    Principal Problem: Major depressive disorder, recurrent severe without psychotic features ([facility] [address]    Alcohol Use Disorder    Psychosocial and Contextual factors:  Financial: Endorses  Occupational: Denies  Relationship: Endorses  Legal: Endorses  Living situation: Denies  Educational: Denies    Past Medical History:   Diagnosis Date    Asthma     Back pain     Drug-induced mood disorder ([facility] [address]     Hypertension     PVC (premature ventricular contraction)     Seasonal allergies     Torn earlobe 2014    bilateral gauged earlobe        TREATMENT CONSIDERATIONS    Continue inpatient psychiatric treatment. Home medications reviewed. Medication as discussed with attending physician  Start Abilify 5 mg daily  Start Paxil 20 mg daily  Problem list updated. Monitor need and frequency of PRN medications. Attempt to develop insight. Follow-up daily while inpatient. Reviewed risks and benefits as well as potential side effects with patient. CONSULTS [x] Yes [] No  Internal Medicine for Medical H&P    Risk Management: close watch per standard protocol      Psychotherapy: participation in milieu and group and individual sessions with Attending Physician,  and Physician Assistant/CNP      Estimated length of stay:  2-14 days      GENERAL PATIENT/FAMILY EDUCATION  Patient will understand basic signs and symptoms, patient will understand benefits/risks and potential side effects from proposed medications, and patient will understand their role in recovery. Family is not active in patient's care.    Patient assets that may be helpful during treatment include: Intent to participate and engage in treatment, sufficient fund of knowledge and intellect to understand and utilize treatments. Goals:    1) Remission of depression with suicidal ideation. 2) Stabilization of symptoms prior to discharge. 3) Establish efficacy and tolerability of medications. Behavioral Services  Medicare Certification     Admission Day 1  I certify that this patient's inpatient psychiatric hospital admission is medically necessary for:    x (1) treatment which could reasonably be expected to improve this patient's condition, or    x (2) diagnostic study or its equivalent. Time Spent: 60 minutes    Johnie Ellison is a 32 y.o. male being evaluated face to face    --DAVID Ren CNP on 8/25/2022 at 11:36 AM    An electronic signature was used to authenticate this note. I independently saw and evaluated the patient. I reviewed the  documentation above. Any additional comments or changes to the   documentation are stated below otherwise agree with assessment. The patient was seen face-to-face. The patient reports that he has not been taking his medication. He has been feeling increasingly depressed. He has some suicidal thoughts. The patient has found his previous combination of Abilify and Paxil beneficial.  The patient wants to restart these medications. Both of these medications have been ordered for him. PLAN  Medications as noted above  Attempt to develop insight  Psycho-education conducted. Estimated Length of Stay is 2-6 days  Supportive Therapy conducted.   Follow-up daily while on inpatient unit    Electronically signed by Elmo Britt MD on 8/25/22 at 7:10 PM EDT

## 2022-08-25 NOTE — GROUP NOTE
Group Therapy Note    Date: 8/25/2022    Group Start Time: 1300  Group End Time: 9400  Group Topic: Cognitive Skills    [facility]    [place], [place]        Group Therapy Note    Attendees: 6/11       Cognitive Skills Group Note     Date: 8/25/2022          Start Time: 13:00                      End Time: 13:40     Number of Participants in Group & Unit Census:  6/11        Topic: Expressing feelings, and exploring positive thoughts, support, resources, activities r/t mental health; also, negative factors r/t mental health, and communication skills     Goal of Group: To increase social interaction and to practice expressing feelings, and explore positive thoughts, support, resources, activities r/t mental health; also, negative factors r/t mental health, and communication skills through creative expression and discussion. Comments: Patient did not participate in Cognitive Skills group, despite staff encouragement and explanation of benefits. Patient remains seclusive to self. Q15 minute safety checks maintained for patient safety and will continue to encourage patient to attend unit programming.            Discipline Responsible: Psychoeducational Specialist        Signature:  Carin Castillo

## 2022-08-25 NOTE — PROGRESS NOTES
Behavioral Services  Medicare Certification Upon Admission    I certify that this patient's inpatient psychiatric hospital admission is medically necessary for:    [x] (1) Treatment which could reasonably be expected to improve this patient's condition,       [x] (2) Or for diagnostic study;     AND     [x](2) The inpatient psychiatric services are provided while the individual is under the care of a physician and are included in the individualized plan of care.     Estimated length of stay/service 2-9 days    Plan for post-hospital care -outpatient care    Electronically signed by Javier Dubose MD on 8/25/2022 at 6:34 PM

## 2022-08-25 NOTE — CARE COORDINATION
BHI Biopsychosocial Assessment    Current Level of Psychosocial Functioning      Independent   Dependent  X  Minimal Assist      Comments:   Patient has a provisional diagnosis of Depression with suicidal ideations, which is the condition established to be chiefly responsible for the admission until more information is gathered during assessments, treatment teams, and 1:1 meetings. Patient documentation in Epic provides prior diagnoses of Major depressive disorder, recurrent, severe with psychotic features; Alcohol-use disorder, moderate    Psychosocial High-Risk Factors (check all that apply)     Unable to obtain meds   Chronic illness/pain    Not taking medications X  Substance abuse X  Lack of Family Support   Addictive Behaviors X  Financial stress   Isolation  Inadequate Community Resources X  Intentional suicide  Suicidal ideations X  Homicidal ideations  Self-mutilation  Victim of crime   Legal issues  Developmental Delay  Unable to manage personal needs    Age 72 or older   Homeless  No transportation   Readmission within 30 days   Unemployment  Traumatic Event    Psychiatric Advanced Directives:   Nothing reported     Family to Cristi Woodruff in Treatment:  Patient lists Nisha Tovar at [phone] as emergency contact, fiance of six years      Sexual Orientation:   Single male     Patient Strengths:  Patient has support from fiance/family; income with recent new job, housing, insurance, sobriety from past heroin use     Patient Barriers:   Patient has guilt w/relationship with father, unresolved grief from loss of grandmother, suicidal ideations and hx of suicide attempts, increase in anger & depressed thoughts. Patient reports worsening depression and states he has been off his psychotropic medications for the last month. Patient reports issues with anger and irritability     Trauma and Abuse/History of Violence:  Patient reports past attempt at hanging himself \"years ago. \"     Opiate/AOD Referral and/or Education Provided: Tox positive for alcohol, Patient states he is on probation for an [employer] w/firearm in the vehicle. Patient reports cannabis use but not daily and not on tox screen. Patient reports history of Heroin abuse and has sobriety for years     CMHC/mental health history:   [facility], therapist is Mariana Seay and nurse practitioner is [facility] of Care:  Medication management, group/individual therapies, family meetings, psychoeducation, 1:1 counseling, treatment team meetings to assist with stabilization      Initial Discharge Plan:  Stabilize mood and medications; explore social support systems within community to increase socialization; provide 24/7 local and national hotline numbers for additional support; safety plan to be completed; disclose/discuss suicidal ideas will improve, decrease depressive symptoms, absence of self-harm. Discharge Location:  D/C to home address on face-sheet; [facility]; continue treatment with [facility] in [place] [id number] Summary:   Darius Duran is a 66-year-old male who presents to [facility] by his fiancé for reporting suicidal thoughts to her stating, \"I'm getting real close to killing myself. \"  ED notes states Pt has been off his medications for a month and Pt has been feeling hopeless and worthless. Pt reports and increase in depression and understands it is probably because he has been off medications and not going to therapy. Pt also states he is stress because he recently started a new job and has been arguing with his fiancé. Pt reports his anxiety is high and his mind is always racing. Pt states, I can't get over the loss of my grandmother.   Pt states he has a lot of guilt and shame regarding the poor relationship with his father. Pt states he lacks any support from his 3 brothers, as well as the mother of his 2 youngest biological children who are 2 and 5.   Pt has been with his fiancé for almost 10years old and her daughter has been raised by Pt most of her life. This is Pt's 2nd [facility] admission with the last on 5/5/2022 - 5/9/2022. Writer to provide Pt with grief groups and other supportive services within the community that he might find helpful. SW will continue to meet with Pt to determine a safe plan at time of discharge.

## 2022-08-25 NOTE — H&P
[facility]  Susi George MD; aRúl Ng MD; Tonny Ray MD; MD Hope Faith MD; Velta Bernheim, MD JOHN [facility] Internal Medicine   [address] / HISTORY AND PHYSICAL EXAMINATION            Date:   8/25/2022  Patient name:  Piyush Briscoe  Date of admission:  8/24/2022  2:13 PM  MRN:   [id number]  Account:  [de-identified]  YOB: 1995  PCP:    DAVID Lundberg CNP  Room:   [address] [place] [address]  Code Status:    Full Code    Physician Requesting Consult: Rita Buchanan MD    Reason for Consult: Medical management    Chief Complaint:     Chief Complaint   Patient presents with    Suicidal     PT to ED for SI without a specific plan \"any way I could\"  Has not been taking meds for about a month. Had a prior suicide attempt several years ago.         History Obtained From:       Pt   History of Present Illness:   80-year-old gentleman with underlying history of anxiety, depression, hypertension, alcohol induced cardiomyopathy some arrhythmias in the past, admitted to inpatient psych for suicidal ideations  Patient has a significant history of arrhythmias in the past, had PVCs, for which she had ablation done, seen cardiology at [facility],  Does not have any symptoms at this time we will get an EKG and a cardiology consult and echocardiogram because he did not have an echocardiogram recently,        Past Medical History:     Past Medical History:   Diagnosis Date    Asthma     Back pain     Drug-induced mood disorder ([address]     Hypertension     PVC (premature ventricular contraction)     Seasonal allergies     Torn earlobe 2014    bilateral gauged earlobe        Past Surgical History:     Past Surgical History:   Procedure Laterality Date    EXTERNAL EAR SURGERY      VENTRICULAR ABLATION SURGERY  11/10/2017        Medications Prior to Admission:     Prior to Admission medications Medication Sig Start Date End Date Taking? Authorizing Provider   ARIPiprazole (ABILIFY) 5 MG tablet Take 5 mg by mouth daily   Yes Historical Provider, MD   PARoxetine (PAXIL) 20 MG tablet Take 20 mg by mouth every morning   Yes Historical Provider, MD   cetirizine (ZYRTEC) 10 MG tablet Take 1 tablet by mouth daily 5/9/22   Diomedes Loving MD   amLODIPine (NORVASC) 10 MG tablet TAKE 1 TABLET BY MOUTH ONE TIME A DAY 5/9/22   Diomedes Loving MD        Allergies:     Cat hair extract    Social History:     Tobacco:    reports that he quit smoking about 6 years ago. His smoking use included cigarettes. He started smoking about 9 years ago. He has a 1.00 pack-year smoking history. His smokeless tobacco use includes snuff and chew. Alcohol:      reports current alcohol use. Drug Use:  reports that he does not currently use drugs after having used the following drugs: Cocaine, Marijuana (Vergil Toya), Opiates , and Other-see comments. Family History:     Family History   Problem Relation Age of Onset    Heart Disease Father     High Blood Pressure Father     Diabetes Father     Alcohol Abuse Father     Asthma Mother     Heart Disease Mother     Coronary Art Dis Maternal Grandfather     Coronary Art Dis Paternal Grandfather        Review of Systems:     Positive and Negative as described in HPI. CONSTITUTIONAL:  negative for fevers, chills, sweats, fatigue, weight loss  HEENT:  negative for vision, hearing changes, runny nose, throat pain  RESPIRATORY:  negative for shortness of breath, cough, congestion, wheezing. CARDIOVASCULAR:  negative for chest pain, palpitations.   GASTROINTESTINAL:  negative for nausea, vomiting, diarrhea, constipation, change in bowel habits, abdominal pain   GENITOURINARY:  negative for difficulty of urination, burning with urination, frequency   INTEGUMENT:  negative for rash, skin lesions, easy bruising   HEMATOLOGIC/LYMPHATIC:  negative for swelling/edema Amphetamine Screen, Ur NEGATIVE NEGATIVE    Barbiturate Screen, Ur NEGATIVE NEGATIVE    Benzodiazepine Screen, Urine NEGATIVE NEGATIVE    Cocaine Metabolite, Urine NEGATIVE NEGATIVE    Methadone Screen, Urine NEGATIVE NEGATIVE    Opiates, Urine NEGATIVE NEGATIVE    Phencyclidine, Urine NEGATIVE NEGATIVE    Cannabinoid Scrn, Ur NEGATIVE NEGATIVE    Oxycodone Screen, Ur NEGATIVE NEGATIVE    Fentanyl, Ur NEGATIVE NEGATIVE    Test Information       Assay provides medical screening only. The absence of expected drug(s) and/or metabolite(s) may indicate diluted or adulterated urine, limitations of testing or timing of collection. Ethanol    Collection Time: 08/24/22  3:10 PM   Result Value Ref Range    Ethanol 10 (H) <10 mg/dL    Ethanol percent 0.010 %   Comprehensive Metabolic Panel    Collection Time: 08/24/22  3:10 PM   Result Value Ref Range    Glucose 92 70 - 99 mg/dL    BUN 11 6 - 20 mg/dL    Creatinine 0.70 0.70 - 1.20 mg/dL    Calcium 9.7 8.6 - 10.4 mg/dL    Sodium 141 135 - 144 mmol/L    Potassium 3.7 3.7 - 5.3 mmol/L    Chloride 105 98 - 107 mmol/L    CO2 22 20 - 31 mmol/L    Anion Gap 14 9 - 17 mmol/L    Alkaline Phosphatase 65 40 - 129 U/L    ALT 26 5 - 41 U/L    AST 15 <40 U/L    Total Bilirubin 0.28 (L) 0.3 - 1.2 mg/dL    Total Protein 7.3 6.4 - 8.3 g/dL    Albumin 4.6 3.5 - 5.2 g/dL    GFR Non-African American >60 >60 mL/min    GFR African American >60 >60 mL/min    GFR Comment             Imaging/Diagonstics:  XR CHEST PORTABLE    Result Date: 8/20/2022  No acute process.        Assessment :      Hospital Problems             Last Modified POA    * (Principal) Major depressive disorder, recurrent severe without psychotic features ([facility] 8/25/2022 Yes    Depression with suicidal ideation 8/25/2022 Yes    Alcohol use disorder, moderate, in controlled environment ([facility] 8/25/2022 Yes    Essential hypertension 8/25/2022 Yes    Cardiomyopathy, alcoholic ([facility] 5/89/1928 Yes    Overview Signed 4/30/2021  4:43 PM by Isidra Biggs MD     Formatting of this note might be different from the original.  Echocardiogram in 2016 with demonstrated decrease in EF and evidence of mild cardiomyopathy. History of cardiac radiofrequency ablation (RFA) 8/25/2022 Yes    Overview Signed 4/30/2021  4:43 PM by Isidra Biggs MD     Formatting of this note might be different from the original.  Treated at [facility] for symptomatic frequent PVC's            Plan:     26-year-old gentleman with anxiety and depression admitted to inpatient psych with suicidal ideations,  Alcohol abuse, watch for any withdrawal,  Essential hypertension, continue home medications, blood pressure running on the lower side, hold chlorthalidone at this time,  Alcoholic cardiomyopathy echocardiogram in 2016 showed low EF,  Patient was seen by cardiology at [facility] clinic, I do not have any echocardiogram report at this time, patient does not have any symptoms either,  Patient was bradycardic to 47 asymptomatic,  Not on beta-blockers at this time,  Continue monitoring vitals,  Getting EKG and echocardiogram,  Get cardiology on board,  DVT prophylaxis, patient mobile,  Full CODE STATUS      Consultations:   Delvin Cazares MD  8/25/2022  1:46 PM    Copy sent to Dr. Taniya Womack, APRN - CNP    Please note that this chart was generated using voice recognition Dragon dictation software. Although every effort was made to ensure the accuracy of this automated transcription, some errors in transcription may have occurred.

## 2022-08-25 NOTE — PLAN OF CARE
[facility]  Initial Interdisciplinary Treatment Plan NO      Original treatment plan Date & Time: 8/25/2022  0923    Admission Type:  Admission Type: Voluntary    Reason for admission:   Reason for Admission: Patient was admitted for suicidal ideations with no specific plan. Patient reports that he has been off his medication for a month. Patient states the past couple of weeks he has been depressed and stated \"I feel like a failure\". Patient reports that he has a past attempt of suicide with a plan by hanging.     Estimated Length of Stay:  5-7days  Estimated Discharge Date: to be determined by physician    PATIENT STRENGTHS:  Patient Strengths:   Patient Strengths and Limitations:   Addictive Behavior: Addictive Behavior  In the Past 3 Months, Have You Felt or Has Someone Told You That You Have a Problem With  : None  Medical Problems:  Past Medical History:   Diagnosis Date    Asthma     Back pain     Drug-induced mood disorder (HCC)     Hypertension     PVC (premature ventricular contraction)     Seasonal allergies     Torn earlobe 2014    bilateral gauged earlobe     Status EXAM:Mental Status and Behavioral Exam  Normal: No  Level of Assistance: Independent/Self  Facial Expression: Flat  Affect: Appropriate  Level of Consciousness: Alert  Frequency of Checks: 4 times per hour, close  Mood:Normal: No  Mood: Anxious, Depressed  Motor Activity:Normal: Yes  Eye Contact: Fair  Observed Behavior: Cooperative, Guarded  Sexual Misconduct History: Current - no  Preception: [place] to person, [place] to time, [place] to place, [place] to situation  Attention:Normal: Yes  Attention: Distractible  Thought Processes: Circumstantial  Thought Content:Normal: Yes  Thought Content: Preoccupations  Depression Symptoms: Sleep disturbance, Feelings of hopelessess, Feelings of helplessness, Loss of interest, Impaired concentration  Anxiety Symptoms: Generalized  Luann Symptoms: No problems reported or

## 2022-08-25 NOTE — GROUP NOTE
Group Therapy Note    Date: 8/25/2022    Group Start Time: 1010  Group End Time: 6928  Group Topic: Psychotherapy    [address], MSW, APRILW        Group Therapy Note    Attendees: 3/15       Patient was offered group therapy today but declined to participate despite encouragement from staff. 1:1 was offered.       Signature:  CORINA Merritt, ROSARIO

## 2022-08-25 NOTE — PLAN OF CARE
Problem: Depression/Self Harm  Goal: Effect of psychiatric condition will be minimized and patient will be protected from self harm  Description: INTERVENTIONS:  1. Assess impact of patient's symptoms on level of functioning, self care needs and offer support as indicated  2. Assess patient/family knowledge of depression, impact on illness and need for teaching  3. Provide emotional support, presence and reassurance  4. Assess for possible suicidal thoughts or ideation. If patient expresses suicidal thoughts or statements do not leave alone, initiate Suicide Precautions, move to a room close to the nursing station and obtain sitter  5. Initiate consults as appropriate with Mental Health Professional, Spiritual Care, Psychosocial CNS, and consider a recommendation to the LIP for a Psychiatric Consultation  Outcome: Progressing     Problem: Self Harm/Suicidality  Goal: Will have no self-injury during hospital stay  Description: INTERVENTIONS:  1. Q 30 MINUTES: Routine safety checks  2. Q SHIFT & PRN: Assess risk to determine if routine checks are adequate to maintain patient safety  Outcome: Progressing   Pt out to the day room and attending group. Denies suicidal thoughts and remains free from harm at this time. Reports continued anxiety  accepting of medications.

## 2022-08-25 NOTE — PLAN OF CARE
Problem: Depression/Self Harm  Goal: Effect of psychiatric condition will be minimized and patient will be protected from self harm  Description: INTERVENTIONS:  1. Assess impact of patient's symptoms on level of functioning, self care needs and offer support as indicated  2. Assess patient/family knowledge of depression, impact on illness and need for teaching  3. Provide emotional support, presence and reassurance  4. Assess for possible suicidal thoughts or ideation. If patient expresses suicidal thoughts or statements do not leave alone, initiate Suicide Precautions, move to a room close to the nursing station and obtain sitter  5. Initiate consults as appropriate with Mental Health Professional, Spiritual Care, Psychosocial CNS, and consider a recommendation to the LIP for a Psychiatric Consultation  Outcome: Progressing  Flowsheets (Taken 8/25/2022 3758)  Effect of psychiatric condition will be minimized and patient will be protected from self harm:   Assess for suicidal thoughts or ideation. If patient expresses suicidal thoughts or statements do not leave alone, initiate Suicide Precautions, move near nurse station, obtain sitter   Assess impact of patients symptoms on level of functioning, self care needs and offer support as indicated   Provide emotional support, presence and reassurance     Problem: Self Harm/Suicidality  Goal: Will have no self-injury during hospital stay  Description: INTERVENTIONS:  1. Q 30 MINUTES: Routine safety checks  2. Q SHIFT & PRN: Assess risk to determine if routine checks are adequate to maintain patient safety  Outcome: Progressing     Problem: Depression  Goal: Will be euthymic at discharge  Description: INTERVENTIONS:  1. Administer medication as ordered  2. Provide emotional support via 1:1 interaction with staff  3. Encourage involvement in milieu/groups/activities  4.  Monitor for social isolation  Outcome: Progressing     Upon approach,  patient observed to be flat

## 2022-08-25 NOTE — GROUP NOTE
Group Therapy Note    Date: 8/25/2022    Group Start Time: 1100  Group End Time: 1140  Group Topic: Cognitive Skills    [facility]    [place]ory, [address]        Group Therapy Note    Attendees: 6/14     Cognitive Skills Group Note     Date: 8/25/2022          Start Time: 11:00                      End Time: 11:40     Number of Participants in Group & Unit Census:  6/14        Topic: Problem solving, concentration, and communication skills. Goal of Group: To increase social interaction and to practice problem solving, concentration, and communication skills. Comments: Patient did not participate in Cognitive Skills group, despite staff encouragement and explanation of benefits. Patient remains seclusive to self. Q15 minute safety checks maintained for patient safety and will continue to encourage patient to attend unit programming.            Discipline Responsible: Psychoeducational Specialist        Signature:  Jaimee Edouard

## 2022-08-25 NOTE — BH NOTE
Pt is anxious as evidence by racing thoughts, poor concentration and restlessness  Staff attempted to find and relieve the distress by talking to patient and offering an as needed medication. Pt  accepted PRN medication Atarax 50 mg tablet PO  and 1:1 talk time.

## 2022-08-26 ENCOUNTER — APPOINTMENT (OUTPATIENT)
Dept: NON INVASIVE DIAGNOSTICS | Age: 27
DRG: 885 | End: 2022-08-26
Payer: COMMERCIAL

## 2022-08-26 LAB
EKG ATRIAL RATE: 63 BPM
EKG P AXIS: 57 DEGREES
EKG P-R INTERVAL: 146 MS
EKG Q-T INTERVAL: 432 MS
EKG QRS DURATION: 110 MS
EKG QTC CALCULATION (BAZETT): 442 MS
EKG R AXIS: 41 DEGREES
EKG T AXIS: 73 DEGREES
EKG VENTRICULAR RATE: 63 BPM
LV EF: 52 %
LVEF MODALITY: NORMAL

## 2022-08-26 PROCEDURE — 99232 SBSQ HOSP IP/OBS MODERATE 35: CPT | Performed by: INTERNAL MEDICINE

## 2022-08-26 PROCEDURE — 1240000000 HC EMOTIONAL WELLNESS R&B

## 2022-08-26 PROCEDURE — 93306 TTE W/DOPPLER COMPLETE: CPT

## 2022-08-26 PROCEDURE — 6370000000 HC RX 637 (ALT 250 FOR IP): Performed by: PSYCHIATRY & NEUROLOGY

## 2022-08-26 PROCEDURE — APPSS30 APP SPLIT SHARED TIME 16-30 MINUTES: Performed by: NURSE PRACTITIONER

## 2022-08-26 PROCEDURE — 99232 SBSQ HOSP IP/OBS MODERATE 35: CPT | Performed by: PSYCHIATRY & NEUROLOGY

## 2022-08-26 RX ADMIN — PAROXETINE HYDROCHLORIDE 20 MG: 20 TABLET, FILM COATED ORAL at 08:45

## 2022-08-26 RX ADMIN — ARIPIPRAZOLE 5 MG: 5 TABLET ORAL at 08:45

## 2022-08-26 RX ADMIN — HYDROXYZINE HYDROCHLORIDE 50 MG: 50 TABLET, FILM COATED ORAL at 16:49

## 2022-08-26 RX ADMIN — CHLORTHALIDONE 50 MG: 25 TABLET ORAL at 08:45

## 2022-08-26 RX ADMIN — CETIRIZINE HYDROCHLORIDE 10 MG: 10 TABLET, FILM COATED ORAL at 08:45

## 2022-08-26 RX ADMIN — HYDROXYZINE HYDROCHLORIDE 50 MG: 50 TABLET, FILM COATED ORAL at 21:17

## 2022-08-26 RX ADMIN — TRAZODONE HYDROCHLORIDE 50 MG: 50 TABLET ORAL at 21:17

## 2022-08-26 RX ADMIN — AMLODIPINE BESYLATE 10 MG: 10 TABLET ORAL at 08:45

## 2022-08-26 NOTE — GROUP NOTE
Group Therapy Note    Date: 8/26/2022    Group Start Time: 6022  Group End Time: 8711  Group Topic: Psychotherapy    [address], MSW, APRILW        Group Therapy Note    Attendees: 6/12       Patient was offered group therapy today but declined to participate despite encouragement from staff. 1:1 was offered.       Signature:  Marek Shi MSW, LSW

## 2022-08-26 NOTE — PROGRESS NOTES
[facility] Internal Medicine  Beni Flynn MD; Nicole Michele MD; Victoria Schaumann, MD; Gerrianne Cranker, MD Carlon Pillion, MD; Broderick Simms MD    [facility]   [address] / HISTORY AND PHYSICAL EXAMINATION            Date:   8/26/2022  Patient name:  Bentley Schultz  Date of admission:  8/24/2022  2:13 PM  MRN:   [id number]  Account:  [de-identified]  YOB: 1995  PCP:    DAVID Duke CNP  Room:   [address] [place] [address]  Code Status:    Full Code    Physician Requesting Consult: Emily Bar MD    Reason for Consult: Medical management    Chief Complaint:     Chief Complaint   Patient presents with    Suicidal     PT to ED for SI without a specific plan \"any way I could\"  Has not been taking meds for about a month. Had a prior suicide attempt several years ago.         History Obtained From:       Pt   History of Present Illness:   26-year-old gentleman with underlying history of anxiety, depression, hypertension, alcohol induced cardiomyopathy some arrhythmias in the past, admitted to inpatient psych for suicidal ideations  Patient has a significant history of arrhythmias in the past, had PVCs, for which she had ablation done, seen cardiology at [facility],  Does not have any symptoms at this time we will get an EKG and a cardiology consult and echocardiogram because he did not have an echocardiogram recently,        Past Medical History:     Past Medical History:   Diagnosis Date    Asthma     Back pain     Drug-induced mood disorder ([address]     Hypertension     PVC (premature ventricular contraction)     Seasonal allergies     Torn earlobe 2014    bilateral gauged earlobe        Past Surgical History:     Past Surgical History:   Procedure Laterality Date    EXTERNAL EAR SURGERY      VENTRICULAR ABLATION SURGERY  11/10/2017        Medications Prior to Admission:     Prior to Admission medications ALLERGIC/IMMUNOLOGIC:  negative for urticaria , itching  ENDOCRINE:  negative increase in drinking, increase in urination, hot or cold intolerance  MUSCULOSKELETAL:  negative joint pains, muscle aches, swelling of joints  NEUROLOGICAL:  negative for headaches, dizziness, lightheadedness, numbness, pain, tingling extremities    Physical Exam:     /69   Pulse 52   Temp 98 °F (36.7 °C) (Oral)   Resp 14   Ht 6' (1.829 m)   Wt 270 lb (122.5 kg)   SpO2 97%   BMI 36.62 kg/m²   Temp (24hrs), Av.1 °F (36.7 °C), Min:98 °F (36.7 °C), Max:98.1 °F (36.7 °C)    No results for input(s): POCGLU in the last 72 hours. No intake or output data in the 24 hours ending 22 8614    General Appearance:  alert, well appearing, and in no acute distress  Mental status: oriented to person, place, and time with normal affect  Head:  normocephalic, atraumatic. Eye: no icterus, redness, pupils equal and reactive, extraocular eye movements intact, conjunctiva clear  Ear: normal external ear, no discharge, hearing intact  Nose:  no drainage noted  Mouth: mucous membranes moist  Neck: supple, no carotid bruits, thyroid not palpable  Lungs: Bilateral equal air entry, clear to ausculation, no wheezing, rales or rhonchi, normal effort  Cardiovascular: normal rate, regular rhythm, no murmur, gallop, rub.   Abdomen: Soft, nontender, nondistended, normal bowel sounds, no hepatomegaly or splenomegaly  Neurologic: There are no new focal motor or sensory deficits, normal muscle tone and bulk, no abnormal sensation, normal speech, cranial nerves II through XII grossly intact  Skin: No gross lesions, rashes, bruising or bleeding on exposed skin area  Extremities:  peripheral pulses palpable, no pedal edema or calf pain with palpation  Psych: normal affect    Investigations:      Laboratory Testing:  Recent Results (from the past 24 hour(s))   EKG 12 Lead    Collection Time: 22  5:04 PM   Result Value Ref Range    Ventricular Rate wall thickness. Lower limits of normal left ventricular systolic function. Calculated EF via   Hair's method is 52 %. No obvious wall motion abnormality seen. Left atrium is normal in size. No significant valvular regurgitation or stenosis seen. Consultations:   IP CONSULT TO INTERNAL MEDICINE  IP CONSULT TO CARDIOLOGY      Kristie Nelson MD  8/26/2022  3:44 PM    Copy sent to Dr. Taniya Womack, APRN - CNP    Please note that this chart was generated using voice recognition Dragon dictation software. Although every effort was made to ensure the accuracy of this automated transcription, some errors in transcription may have occurred.

## 2022-08-26 NOTE — PLAN OF CARE
Problem: Depression/Self Harm  Goal: Effect of psychiatric condition will be minimized and patient will be protected from self harm  Description: INTERVENTIONS:  1. Assess impact of patient's symptoms on level of functioning, self care needs and offer support as indicated  2. Assess patient/family knowledge of depression, impact on illness and need for teaching  3. Provide emotional support, presence and reassurance  4. Assess for possible suicidal thoughts or ideation. If patient expresses suicidal thoughts or statements do not leave alone, initiate Suicide Precautions, move to a room close to the nursing station and obtain sitter  5. Initiate consults as appropriate with Mental Health Professional, Spiritual Care, Psychosocial CNS, and consider a recommendation to the LIP for a Psychiatric Consultation  8/25/2022 2321 by Nik Ovalle LPN  Outcome: Progressing   Patient is isolative for long periods of time. Patient out for needs only. Patient is medication compliant and verbalizes no concerns with his medications. Patient is pleasant and cooperative throughout talk time with writer, but is guarded with responses. Patient is calm and maintains behavioral control on unit. Will continue to monitor. Problem: Self Harm/Suicidality  Goal: Will have no self-injury during hospital stay  Description: INTERVENTIONS:  1. Q 30 MINUTES: Routine safety checks  2. Q SHIFT & PRN: Assess risk to determine if routine checks are adequate to maintain patient safety  8/25/2022 2321 by Nik Ovalle LPN  Outcome: Progressing   Patient remains free from self harm and currently denies any suicidal thoughts. Patient agrees to seek staff if thoughts arise. 15 minute checks maintained for patient safety. Will continue to monitor and provide support and reassurance as needed.

## 2022-08-26 NOTE — PROGRESS NOTES
Daily Progress Note  8/26/2022    Patient Name: Orly Harry    CHIEF COMPLAINT: Depression with suicidal ideation         SUBJECTIVE:     Patient seen face-to-face for follow-up assessment. He is resting in bed but is arousable to verbal stimuli. Patient is discharged focused. At first minimizing of symptoms, but does note that he is admitted voluntarily and felt that he needed help. Patient has a significant history of suicide attempts and verbalizes that he does not yet feel safe for himself. He was restarted on medications that in the past he found beneficial and is hopeful that they will help with his depression. Patient current regimen includes Paxil 20 mg and aripiprazole 5 mg daily. We reviewed side effects and patient denies all. He states that he is sleeping well overnight. He does have elevated anxiety which he rates 8 out of 10 (with 10 indicating the most severe) and utilized hydroxyzine with benefit. Patient has a history of cardiomyopathy with bradycardia on presentation to unit rate of 47. He had an echo today and internal medicine and cardiology have been consulted to assess and manage. Patient's EKG and QTC is within normal limits. Staff report that patient has been primarily isolative and withdrawn. He has not been engaging in the milieu or attending group programming. Did encourage patient to be more active and to utilize all services provided while here on unit. Patient has yet to demonstrate stability and requires inpatient hospitalization for safety.     Psych med compliant: [x] Yes    [] No     E-meds in last 24 hrs: [] Yes   [x] No    Appetite:  [x] Normal/Adequate/Unchanged  [] Increased  [] Decreased      Sleep:       [x] Normal/Adequate/Unchanged  [] Fair  [] Poor      Group Attendance:   [] Yes  [] Selectively    [x] No    Medication Side Effects: Denies         Mental Status Exam  Level of consciousness: Alert and awake   Appearance: Appropriate attire for setting, seated on bed, with fair  grooming and hygiene   Behavior/Motor: Approachable, no psychomotor abnormalities   Attitude toward examiner: Cooperative, attentive, good eye contact   Speech: spontaneous, normal rate, normal volume, and well articulated   Mood: Patient reports \"feeling better\"  Affect: Blunted  Thought processes: linear and goal directed   Thought content: Denies homicidal ideation  Suicidal Ideation: Reports improving suicidal ideation, unable to contract for safety off unit  Delusions:   Perceptual Disturbance: Patient denies. Does not appear to be responding to internal stimuli. Cognition: Oriented to self, location, time, and situation  Memory: intact  Insight & Judgement: fair     Data   height is 6' (1.829 m) and weight is 270 lb (122.5 kg). His oral temperature is 98 °F (36.7 °C). His blood pressure is 124/69 and his pulse is 52. His respiration is 14 and oxygen saturation is 97%.    Labs:   Admission on 08/24/2022   Component Date Value Ref Range Status    Amphetamine Screen, Ur 08/24/2022 NEGATIVE  NEGATIVE Final    Comment:       (Positive cutoff 1000 ng/mL)                  Barbiturate Screen, Ur 08/24/2022 NEGATIVE  NEGATIVE Final    Comment:       (Positive cutoff 200 ng/mL)                  Benzodiazepine Screen, Urine 08/24/2022 NEGATIVE  NEGATIVE Final    Comment:       (Positive cutoff 200 ng/mL)                  Cocaine Metabolite, Urine 08/24/2022 NEGATIVE  NEGATIVE Final    Comment:       (Positive cutoff 300 ng/mL)                  Methadone Screen, Urine 08/24/2022 NEGATIVE  NEGATIVE Final    Comment:       (Positive cutoff 300 ng/mL)                  Opiates, Urine 08/24/2022 NEGATIVE  NEGATIVE Final    Comment:       (Positive cutoff 300 ng/mL)                  Phencyclidine, Urine 08/24/2022 NEGATIVE  NEGATIVE Final    Comment:       (Positive cutoff 25 ng/mL)                  Cannabinoid Scrn, Ur 08/24/2022 NEGATIVE  NEGATIVE Final    Comment:       (Positive cutoff 50 ng/mL) Oxycodone Screen, Ur 08/24/2022 NEGATIVE  NEGATIVE Final    Comment:       (Positive cutoff 100 ng/mL)                  Fentanyl, Ur 08/24/2022 NEGATIVE  NEGATIVE Final    Comment:       (Positive cutoff  5 ng/ml)            Test Information 08/24/2022 Assay provides medical screening only. The absence of expected drug(s) and/or metabolite(s) may indicate diluted or adulterated urine, limitations of testing or timing of collection. Final    Comment: Testing for legal purposes should be confirmed by another method. To request confirmation   of test result, please call the lab within 7 days of sample submission. Ethanol 08/24/2022 10 (A) <10 mg/dL Final    Ethanol percent 08/24/2022 0.010  % Final    Glucose 08/24/2022 92  70 - 99 mg/dL Final    BUN 08/24/2022 11  6 - 20 mg/dL Final    Creatinine 08/24/2022 0.70  0.70 - 1.20 mg/dL Final    Calcium 08/24/2022 9.7  8.6 - 10.4 mg/dL Final    Sodium 08/24/2022 141  135 - 144 mmol/L Final    Potassium 08/24/2022 3.7  3.7 - 5.3 mmol/L Final    Chloride 08/24/2022 105  98 - 107 mmol/L Final    CO2 08/24/2022 22  20 - 31 mmol/L Final    Anion Gap 08/24/2022 14  9 - 17 mmol/L Final    Alkaline Phosphatase 08/24/2022 65  40 - 129 U/L Final    ALT 08/24/2022 26  5 - 41 U/L Final    AST 08/24/2022 15  <40 U/L Final    Total Bilirubin 08/24/2022 0.28 (A) 0.3 - 1.2 mg/dL Final    Total Protein 08/24/2022 7.3  6.4 - 8.3 g/dL Final    Albumin 08/24/2022 4.6  3.5 - 5.2 g/dL Final    GFR Non- 08/24/2022 >60  >60 mL/min Final    GFR  08/24/2022 >60  >60 mL/min Final    GFR Comment 08/24/2022        Final    Comment: Average GFR for 2129 years old:   116 mL/min/1.73sq m  Chronic Kidney Disease:   <60 mL/min/1.73sq m  Kidney failure:   <15 mL/min/1.73sq m              eGFR calculated using average adult body mass.  Additional eGFR calculator available at:        [email or web address]            Ventricular Rate 08/25/2022 63  BPM Final    Atrial Rate 08/25/2022 63  BPM Final    P-R Interval 08/25/2022 146  ms Final    QRS Duration 08/25/2022 110  ms Final    Q-T Interval 08/25/2022 432  ms Final    QTc Calculation (Bazett) 08/25/2022 442  ms Final    P Axis 08/25/2022 57  degrees Final    R Axis 08/25/2022 41  degrees Final    T Axis 08/25/2022 73  degrees Final         Reviewed patient's current plan of care and vital signs with nursing staff. Labs reviewed: [x] Yes  Last EKG in EMR reviewed: [x] Yes    Medications  Current Facility-Administered Medications: haloperidol lactate (HALDOL) injection 5 mg, 5 mg, IntraMUSCular, Q6H PRN **AND** LORazepam (ATIVAN) injection 2 mg, 2 mg, IntraMUSCular, Q6H PRN **AND** diphenhydrAMINE (BENADRYL) injection 50 mg, 50 mg, IntraMUSCular, Q6H PRN  acetaminophen (TYLENOL) tablet 650 mg, 650 mg, Oral, Q6H PRN  ibuprofen (ADVIL;MOTRIN) tablet 400 mg, 400 mg, Oral, Q6H PRN  hydrOXYzine HCl (ATARAX) tablet 50 mg, 50 mg, Oral, TID PRN  traZODone (DESYREL) tablet 50 mg, 50 mg, Oral, Nightly PRN  polyethylene glycol (GLYCOLAX) packet 17 g, 17 g, Oral, Daily PRN  aluminum & magnesium hydroxide-simethicone (MAALOX) 200-200-20 MG/5ML suspension 30 mL, 30 mL, Oral, Q6H PRN  nicotine polacrilex (NICORETTE) gum 2 mg, 2 mg, Oral, Q2H PRN  ARIPiprazole (ABILIFY) tablet 5 mg, 5 mg, Oral, Daily  cetirizine (ZYRTEC) tablet 10 mg, 10 mg, Oral, Daily  chlorthalidone (HYGROTON) tablet 50 mg, 50 mg, Oral, Daily  PARoxetine (PAXIL) tablet 20 mg, 20 mg, Oral, QAM  amLODIPine (NORVASC) tablet 10 mg, 10 mg, Oral, Daily    ASSESSMENT  Major depressive disorder, recurrent severe without psychotic features ([facility] [address]    Alcohol use disorder         HANDOFF  Patient symptoms are: Modestly improving  Medications as determined by attending physician  Encourage participation in groups and milieu.   Probable discharge is to be determined by MD    Electronically signed by DAVID Rahman CNP on 8/26/2022 at 6:11 PM    **This report has been created using voice recognition software. It may contain minor errors which are inherent in voice recognition technology. **   I independently saw and evaluated the patient. I reviewed the nurse practioner's documentation above. Any additional comments or changes to the    documentation are stated below otherwise agree with assessment. The patient was seen face-to-face. He states he has been taking his medications as prescribed. He feels a little better in his mood. He is denying suicidal thoughts. He is interested in being discharged. He has slept well at night. We discussed that we would like to see a couple of days of stability prior to discharge. No changes have been made to his medications          PLAN  Medications as noted above  Attempt to develop insight  Expected Length of Stay is 2-6 days. Psycho-education conducted. Supportive Therapy conducted.   Follow-up daily while on inpatient unit    Electronically signed by Jessika Freire MD on 8/26/22 at 9:15 PM EDT

## 2022-08-26 NOTE — PLAN OF CARE
Problem: Depression/Self Harm  Goal: Effect of psychiatric condition will be minimized and patient will be protected from self harm  Description: INTERVENTIONS:  1. Assess impact of patient's symptoms on level of functioning, self care needs and offer support as indicated  2. Assess patient/family knowledge of depression, impact on illness and need for teaching  3. Provide emotional support, presence and reassurance  4. Assess for possible suicidal thoughts or ideation. If patient expresses suicidal thoughts or statements do not leave alone, initiate Suicide Precautions, move to a room close to the nursing station and obtain sitter  5. Initiate consults as appropriate with Mental Health Professional, Spiritual Care, Psychosocial CNS, and consider a recommendation to the [facility] for a Psychiatric Consultation  Outcome: Progressing     Problem: Self Harm/Suicidality  Goal: Will have no self-injury during hospital stay  Description: INTERVENTIONS:  1. Q 30 MINUTES: Routine safety checks  2. Q SHIFT & PRN: Assess risk to determine if routine checks are adequate to maintain patient safety  Outcome: Progressing     Problem: Depression  Goal: Will be euthymic at discharge  Description: INTERVENTIONS:  1. Administer medication as ordered  2. Provide emotional support via 1:1 interaction with staff  3. Encourage involvement in milieu/groups/activities  4. Monitor for social isolation  Outcome: Progressing     Upon approach, patient observed to be flat and withdrawn. At this time, patient denies feeling depressed and denies feeling anxious; patient spends majority of the day isolated in his bed. Patient denies having any thoughts of harming himself or others; he remains free from falls and injuries--safety checks continue every 15 minutes and at random intervals. Patient denies experiencing auditory, visual, tactile, olfactory or gustatory hallucinations.  Patient slept okay overnight and has no changes in his appetite to report. Patient's vital signs remain stable and he has no complaints of pain. Patient is capable of completing ADLs independently and is encouraged to do so.

## 2022-08-26 NOTE — CONSULTS
[facility]  In Patient Cardiology Consult             Date:   2022  Patient name: Jonna Israel  Date of admission:  2022  2:13 PM  MRN:   [id number]  YOB: 1995    Reason for Admission:  Depression    CHIEF COMPLAINT:  depression     History Obtained From:  pt and chart    HISTORY OF PRESENT ILLNESS:    This is a 60-year-old male who presented due to depression. He has a history of significant PVCs. Also has a history of cardiomyopathy. Underwent a PVC ablation at [facility]. Has not followed up with a cardiologist.  Was admitted due to depression. Cardiology was consulted due to his above history. Denies any orthopnea, PND, lower extremity edema. Denies any chest pains.     Past Medical History:    Past Medical History:   Diagnosis Date    Asthma     Back pain     Drug-induced mood disorder (HCC)     Hypertension     PVC (premature ventricular contraction)     Seasonal allergies     Torn earlobe     bilateral gauged earlobe         Past Surgical History:    Past Surgical History:   Procedure Laterality Date    EXTERNAL EAR SURGERY      VENTRICULAR ABLATION SURGERY  11/10/2017         Home Medications:    Outpatient Medications Marked as Taking for the 22 encounter [facility] Encounter)   Medication Sig Dispense Refill    ARIPiprazole (ABILIFY) 5 MG tablet Take 5 mg by mouth daily      PARoxetine (PAXIL) 20 MG tablet Take 20 mg by mouth every morning          Allergies:  Cat hair extract    Social History:    Social History     Socioeconomic History    Marital status: Single     Spouse name: None    Number of children: None    Years of education: None    Highest education level: None   Tobacco Use    Smoking status: Former     Packs/day: 1.00     Years: 1.00     Pack years: 1.00     Types: Cigarettes     Start date: 2013     Quit date: 2016     Years since quittin.1    Smokeless tobacco: Current     Types: Snuff, Chew    Tobacco comments: Discussed dental care for oral cancer detection and prevention. Discussed risks for tobacco / nicotine. Vaping Use    Vaping Use: Never used   Substance and Sexual Activity    Alcohol use: Yes     Alcohol/week: 0.0 standard drinks     Comment: occasional    Drug use: Not Currently     Types: Cocaine, Marijuana ([place]), Opiates , Other-see comments     Comment: No longer uses. Hx of heroin use    Sexual activity: Yes        Family History:   Family History   Problem Relation Age of Onset    Heart Disease Father     High Blood Pressure Father     Diabetes Father     Alcohol Abuse Father     Asthma Mother     Heart Disease Mother     Coronary Art Dis Maternal Grandfather     Coronary Art Dis Paternal Grandfather        REVIEW OF SYSTEMS:    Constitutional: there has been no unanticipated weight loss. There's been No change in energy level, No change in activity level. Eyes: No visual changes or diplopia. No scleral icterus. ENT: No Headaches, hearing loss or vertigo. No mouth sores or sore throat. Cardiovascular: As HPI  Respiratory: As HPI  Gastrointestinal: No abdominal pain, appetite loss, blood in stools. No change in bowel or bladder habits. Genitourinary: No dysuria, trouble voiding, or hematuria. Musculoskeletal:  No gait disturbance, No weakness or joint complaints. Integumentary: No rash or pruritis. Neurological: No headache, diplopia, change in muscle strength, numbness or tingling. No change in gait, balance, coordination, mood, affect, memory, mentation, behavior. Psychiatric: No anxiety, or depression. Endocrine: No temperature intolerance. No excessive thirst, fluid intake, or urination. No tremor. Hematologic/Lymphatic: No abnormal bruising or bleeding, blood clots or swollen lymph nodes. Allergic/Immunologic: No nasal congestion or hives.     PHYSICAL EXAM:    Physical Examination:    BP (!) 142/62   Pulse 71   Temp 98.1 °F (36.7 °C) (Oral)   Resp 14   Ht 6' (1.829 m)   Wt 270 lb (122.5 kg)   SpO2 97%   BMI 36.62 kg/m²    Constitutional and General Appearance: alert, cooperative, no distress and appears stated age  [de-identified]: PERRL, no cervical lymphadenopathy. No masses palpable. Normal oral mucosa  Respiratory:  Normal excursion and expansion without use of accessory muscles  Resp Auscultation: Good respiratory effort. No for increased work of breathing. On auscultation: clear  Cardiovascular:  Heart tones are crisp and normal. regular S1 and S2. Murmurs: none  Jugular venous pulsation Normal  Abdomen:  No masses or tenderness  Bowel sounds present  Extremities:   No Cyanosis or Clubbing   Lower extremity edema: none   Skin: Warm and dry  Neurological:  Alert and oriented. Moves all extremities well  No abnormalities of mood, affect, memory, mentation, or behavior are noted    DATA:    Diagnostics:      EKG:   Results for orders placed or performed during the hospital encounter of 08/24/22   EKG 12 Lead   Result Value Ref Range    Ventricular Rate 63 BPM    Atrial Rate 63 BPM    P-R Interval 146 ms    QRS Duration 110 ms    Q-T Interval 432 ms    QTc Calculation (Bazett) 442 ms    P Axis 57 degrees    R Axis 41 degrees    T Axis 73 degrees    Narrative    Normal sinus rhythm  Normal ECG  No previous ECGs available       Echo:      Labs:     CBC: No results for input(s): WBC, HGB, HCT, PLT in the last 72 hours. BMP:   Recent Labs     08/24/22  1510      K 3.7   CO2 22   BUN 11   CREATININE 0.70   LABGLOM >60   GLUCOSE 92     BNP: No results for input(s): BNP in the last 72 hours. PT/INR: No results for input(s): PROTIME, INR in the last 72 hours. APTT:No results for input(s): APTT in the last 72 hours. CARDIAC ENZYMES:No results for input(s): TROPHS in the last 72 hours.   FASTING LIPID PANEL:  Lab Results   Component Value Date/Time    HDL 41 05/02/2021 09:34 AM     LIVER PROFILE:  Recent Labs     08/24/22  1510   AST 15   ALT 26   LABALBU 4.6         IMPRESSION:    Patient Active Problem List   Diagnosis    Torn earlobe    Precordial pain    PVC's (premature ventricular contractions)    Essential hypertension    Unable to control anger    Cyclothymic disorder    Chest pain    PVC (premature ventricular contraction)    Seasonal allergies    Cardiomyopathy, alcoholic ([facility] [address]    History of cardiac radiofrequency ablation (RFA)    Depression with suicidal ideation    Major depressive disorder, recurrent, severe with psychotic features ([facility] [address]    Alcohol use disorder, moderate, in controlled environment ([facility]    Major depressive disorder, recurrent severe without psychotic features ([facility]     -Depression  -H/o PVC  -H/o Ablation  -H/o Cardiomyopathy      RECOMMENDATIONS:  -We will check a 2D echo. -Continue amlodipine and chlorthalidone    If echocardiogram is low risk patient will be discharged from cardiac standpoint with outpatient follow-up in 2 weeks. Discussed with patient and nursing. Thank you for allowing me to participate in the care of this patient, please do not hesitate to call if you have any questions. Jonathan Santos DO, [facility] [place], [address], [address] [facility]  [email or web address]  [phone]

## 2022-08-26 NOTE — GROUP NOTE
Group Therapy Note    Date: 8/26/2022    Group Start Time: 1100  Group End Time: 9315  Group Topic: Cognitive Skills    [facility]    [place], [address]        Group Therapy Note    Attendees: 6/11     Cognitive Skills Group Note     Date: 8/26/2022          Start Time: 11:00                      End Time: 11:45     Number of Participants in Group & Unit Census:  6/11        Topic: Decision making, and communication skills. Goal of Group: To increase social interaction and to practice decision making and communication skills. Comments: Patient did not participate in Cognitive Skills group, despite staff encouragement and explanation of benefits. Patient remains seclusive to self. Q15 minute safety checks maintained for patient safety and will continue to encourage patient to attend unit programming.            Discipline Responsible: Psychoeducational Specialist        Signature:  Kamilah Fishman

## 2022-08-26 NOTE — GROUP NOTE
Group Therapy Note    Date: 8/26/2022    Group Start Time: 1430  Group End Time: 1525  Group Topic: Cognitive Skills    [facility]    [place], [place]        Group Therapy Note    Attendees: 6/9       Cognitive Skills Group Note     Date: 8/26/2022          Start Time: 14:30                      End Time: 15:25     Number of Participants in Group & Unit Census:  6/9        Topic: Expressing feelings, and explore strengths, and potential for positive personal growth in small steps - within the 6 domains of wellness, and communication skills      Goal of Group: To increase social interaction and to practice expressing feelings, and explore strengths, and potential for positive personal growth in small steps - within the 6 domains of wellness, and communication skills through creative expression and discussion. Comments: Patient did not participate in Cognitive Skills group, despite staff encouragement and explanation of benefits. Patient remains seclusive to self. Q15 minute safety checks maintained for patient safety and will continue to encourage patient to attend unit programming.            Discipline Responsible: Psychoeducational Specialist        Signature:  Carin Castillo

## 2022-08-27 PROCEDURE — 99232 SBSQ HOSP IP/OBS MODERATE 35: CPT | Performed by: PSYCHIATRY & NEUROLOGY

## 2022-08-27 PROCEDURE — 1240000000 HC EMOTIONAL WELLNESS R&B

## 2022-08-27 PROCEDURE — 6370000000 HC RX 637 (ALT 250 FOR IP): Performed by: PSYCHIATRY & NEUROLOGY

## 2022-08-27 PROCEDURE — APPSS30 APP SPLIT SHARED TIME 16-30 MINUTES: Performed by: NURSE PRACTITIONER

## 2022-08-27 RX ADMIN — AMLODIPINE BESYLATE 10 MG: 10 TABLET ORAL at 07:44

## 2022-08-27 RX ADMIN — PAROXETINE HYDROCHLORIDE 20 MG: 20 TABLET, FILM COATED ORAL at 07:44

## 2022-08-27 RX ADMIN — TRAZODONE HYDROCHLORIDE 50 MG: 50 TABLET ORAL at 21:23

## 2022-08-27 RX ADMIN — HYDROXYZINE HYDROCHLORIDE 50 MG: 50 TABLET, FILM COATED ORAL at 12:15

## 2022-08-27 RX ADMIN — CHLORTHALIDONE 50 MG: 25 TABLET ORAL at 07:44

## 2022-08-27 RX ADMIN — ARIPIPRAZOLE 5 MG: 5 TABLET ORAL at 07:44

## 2022-08-27 RX ADMIN — CETIRIZINE HYDROCHLORIDE 10 MG: 10 TABLET, FILM COATED ORAL at 07:44

## 2022-08-27 RX ADMIN — HYDROXYZINE HYDROCHLORIDE 50 MG: 50 TABLET, FILM COATED ORAL at 21:23

## 2022-08-27 ASSESSMENT — PAIN SCALES - GENERAL: PAINLEVEL_OUTOF10: 0

## 2022-08-27 NOTE — GROUP NOTE
Group Therapy Note    Date: 8/26/2022    Group Start Time: 2030  Group End Time: 2106  Group Topic: Wrap-Up    SARAH Mariscal        Group Therapy Note    Attendees 6       Patient's Goal:  none    Notes:  just taking it all in    Status After Intervention:  Improved    Participation Level:  Active Listener    Participation Quality: Supportive      Speech:  normal      Thought Process/Content: Logical      Affective Functioning: Blunted      Mood: anxious      Level of consciousness:  Alert, Oriented x4, and Attentive      Response to Learning: Capable of insight      Endings: None Reported    Modes of Intervention: Problem-solving      Discipline Responsible: Acomni      Signature:  Jonna Ashton

## 2022-08-27 NOTE — PROGRESS NOTES
Daily Progress Note  8/27/2022    Patient Name: Vanessa Encarnacion    CHIEF COMPLAINT: Depression with suicidal ideation         SUBJECTIVE:     Patient seen face-to-face for follow-up assessment. He is sitting in the day area and is cooperative with discussion. Patient notes that he is \"doing very good\". His affect is euthymic and he reports good energy and motivation today. Does appear to be somewhat superficial in his responses as he is discharged focused and states he needs to return to work. Patient reporting improving suicidal thoughts. For that psychotropic medications have been restarted and verbalizes that the aripiprazole and paroxetine have been beneficial.  States that his anxiety is less severe today eating it 3 out of 10 (with 10 indicating the most intense). He did utilize hydroxyzine earlier today with benefit. Patient states that he has been sleeping well overnight and denies any problems with appetite. Patient has been active in his treatment and attending group programming. Patient showing modest improvement but has yet to demonstrate stability. Requires inpatient hospitalization for safety.     Psych med compliant: [x] Yes    [] No     E-meds in last 24 hrs: [] Yes   [x] No    Appetite:  [x] Normal/Adequate/Unchanged  [] Increased  [] Decreased      Sleep:       [x] Normal/Adequate/Unchanged  [] Fair  [] Poor      Group Attendance:   [x] Yes  [] Selectively    [] No    Medication Side Effects: Denies         Mental Status Exam  Level of consciousness: Alert and awake   Appearance: Appropriate attire for setting, seated on chair, with good grooming and hygiene   Behavior/Motor: Approachable, no psychomotor abnormalities   Attitude toward examiner: Cooperative, attentive, good eye contact   Speech: spontaneous, normal rate, normal volume, and well articulated   Mood: Patient reports \"feeling better\"  Affect: Euthymic  Thought processes: linear and goal directed   Thought content: Denies homicidal ideation  Suicidal Ideation: Reports improving suicidal ideation  Delusions:   Perceptual Disturbance: Patient denies. Does not appear to be responding to internal stimuli. Cognition: Oriented to self, location, time, and situation  Memory: intact  Insight & Judgement: fair     Data   height is 6' (1.829 m) and weight is 270 lb (122.5 kg). His oral temperature is 97.9 °F (36.6 °C). His blood pressure is 120/68 and his pulse is 60. His respiration is 14 and oxygen saturation is 97%. Labs:   Admission on 08/24/2022   Component Date Value Ref Range Status    Amphetamine Screen, Ur 08/24/2022 NEGATIVE  NEGATIVE Final    Comment:       (Positive cutoff 1000 ng/mL)                  Barbiturate Screen, Ur 08/24/2022 NEGATIVE  NEGATIVE Final    Comment:       (Positive cutoff 200 ng/mL)                  Benzodiazepine Screen, Urine 08/24/2022 NEGATIVE  NEGATIVE Final    Comment:       (Positive cutoff 200 ng/mL)                  Cocaine Metabolite, Urine 08/24/2022 NEGATIVE  NEGATIVE Final    Comment:       (Positive cutoff 300 ng/mL)                  Methadone Screen, Urine 08/24/2022 NEGATIVE  NEGATIVE Final    Comment:       (Positive cutoff 300 ng/mL)                  Opiates, Urine 08/24/2022 NEGATIVE  NEGATIVE Final    Comment:       (Positive cutoff 300 ng/mL)                  Phencyclidine, Urine 08/24/2022 NEGATIVE  NEGATIVE Final    Comment:       (Positive cutoff 25 ng/mL)                  Cannabinoid Scrn, Ur 08/24/2022 NEGATIVE  NEGATIVE Final    Comment:       (Positive cutoff 50 ng/mL)                  Oxycodone Screen, Ur 08/24/2022 NEGATIVE  NEGATIVE Final    Comment:       (Positive cutoff 100 ng/mL)                  Fentanyl, Ur 08/24/2022 NEGATIVE  NEGATIVE Final    Comment:       (Positive cutoff  5 ng/ml)            Test Information 08/24/2022 Assay provides medical screening only.   The absence of expected drug(s) and/or metabolite(s) may indicate diluted or adulterated urine, limitations of testing or timing of collection. Final    Comment: Testing for legal purposes should be confirmed by another method. To request confirmation   of test result, please call the lab within 7 days of sample submission. Ethanol 08/24/2022 10 (A) <10 mg/dL Final    Ethanol percent 08/24/2022 0.010  % Final    Glucose 08/24/2022 92  70 - 99 mg/dL Final    BUN 08/24/2022 11  6 - 20 mg/dL Final    Creatinine 08/24/2022 0.70  0.70 - 1.20 mg/dL Final    Calcium 08/24/2022 9.7  8.6 - 10.4 mg/dL Final    Sodium 08/24/2022 141  135 - 144 mmol/L Final    Potassium 08/24/2022 3.7  3.7 - 5.3 mmol/L Final    Chloride 08/24/2022 105  98 - 107 mmol/L Final    CO2 08/24/2022 22  20 - 31 mmol/L Final    Anion Gap 08/24/2022 14  9 - 17 mmol/L Final    Alkaline Phosphatase 08/24/2022 65  40 - 129 U/L Final    ALT 08/24/2022 26  5 - 41 U/L Final    AST 08/24/2022 15  <40 U/L Final    Total Bilirubin 08/24/2022 0.28 (A) 0.3 - 1.2 mg/dL Final    Total Protein 08/24/2022 7.3  6.4 - 8.3 g/dL Final    Albumin 08/24/2022 4.6  3.5 - 5.2 g/dL Final    GFR Non- 08/24/2022 >60  >60 mL/min Final    GFR  08/24/2022 >60  >60 mL/min Final    GFR Comment 08/24/2022        Final    Comment: Average GFR for 2129 years old:   116 mL/min/1.73sq m  Chronic Kidney Disease:   <60 mL/min/1.73sq m  Kidney failure:   <15 mL/min/1.73sq m              eGFR calculated using average adult body mass.  Additional eGFR calculator available at:        [employer]            Ventricular Rate 08/25/2022 63  BPM Final    Atrial Rate 08/25/2022 63  BPM Final    P-R Interval 08/25/2022 146  ms Final    QRS Duration 08/25/2022 110  ms Final    Q-T Interval 08/25/2022 432  ms Final    QTc Calculation (Bazett) 08/25/2022 442  ms Final    P Axis 08/25/2022 57  degrees Final    R Axis 08/25/2022 41  degrees Final    T Axis 08/25/2022 73  degrees Final         Reviewed patient's current plan of care and vital signs with nursing staff. Labs reviewed: [x] Yes  Last EKG in EMR reviewed: [x] Yes    Medications  Current Facility-Administered Medications: haloperidol lactate (HALDOL) injection 5 mg, 5 mg, IntraMUSCular, Q6H PRN **AND** LORazepam (ATIVAN) injection 2 mg, 2 mg, IntraMUSCular, Q6H PRN **AND** diphenhydrAMINE (BENADRYL) injection 50 mg, 50 mg, IntraMUSCular, Q6H PRN  acetaminophen (TYLENOL) tablet 650 mg, 650 mg, Oral, Q6H PRN  ibuprofen (ADVIL;MOTRIN) tablet 400 mg, 400 mg, Oral, Q6H PRN  hydrOXYzine HCl (ATARAX) tablet 50 mg, 50 mg, Oral, TID PRN  traZODone (DESYREL) tablet 50 mg, 50 mg, Oral, Nightly PRN  polyethylene glycol (GLYCOLAX) packet 17 g, 17 g, Oral, Daily PRN  aluminum & magnesium hydroxide-simethicone (MAALOX) 200-200-20 MG/5ML suspension 30 mL, 30 mL, Oral, Q6H PRN  nicotine polacrilex (NICORETTE) gum 2 mg, 2 mg, Oral, Q2H PRN  ARIPiprazole (ABILIFY) tablet 5 mg, 5 mg, Oral, Daily  cetirizine (ZYRTEC) tablet 10 mg, 10 mg, Oral, Daily  chlorthalidone (HYGROTON) tablet 50 mg, 50 mg, Oral, Daily  PARoxetine (PAXIL) tablet 20 mg, 20 mg, Oral, QAM  amLODIPine (NORVASC) tablet 10 mg, 10 mg, Oral, Daily    ASSESSMENT  Major depressive disorder, recurrent severe without psychotic features ([facility] [address]    Alcohol use disorder         HANDOFF  Patient symptoms are: Modestly improving  Medications as determined by attending physician  Encourage participation in groups and milieu. Probable discharge is to be determined by MD    Electronically signed by DAVID Puentes CNP on 8/27/2022 at 4:48 PM    **This report has been created using voice recognition software. It may contain minor errors which are inherent in voice recognition technology. **   I independently saw and evaluated the patient. I reviewed the  documentation above. Any additional comments or changes to the   documentation are stated below otherwise agree with assessment. The patient reports an improvement in his mood.   He has been taking his medications and is discharged focused. He denies any suicidal thoughts      PLAN  Medications as noted above  Attempt to develop insight  Psycho-education conducted. Estimated Length of Stay is 1-2 days  Supportive Therapy conducted.   Follow-up daily while on inpatient unit    Electronically signed by Clarissa Cano MD on 8/27/22 at 6:15 PM EDT

## 2022-08-27 NOTE — PLAN OF CARE
Problem: Depression/Self Harm  Goal: Effect of psychiatric condition will be minimized and patient will be protected from self harm  Description: INTERVENTIONS:  1. Assess impact of patient's symptoms on level of functioning, self care needs and offer support as indicated  2. Assess patient/family knowledge of depression, impact on illness and need for teaching  3. Provide emotional support, presence and reassurance  4. Assess for possible suicidal thoughts or ideation. If patient expresses suicidal thoughts or statements do not leave alone, initiate Suicide Precautions, move to a room close to the nursing station and obtain sitter  5. Initiate consults as appropriate with Mental Health Professional, Spiritual Care, Psychosocial CNS, and consider a recommendation to the [facility] for a Psychiatric Consultation  8/26/2022 2036 by Ofe Raymond LPN  Outcome: Progressing   Patient remains free from self harm. Patient agrees to seek staff if thoughts arise. Patient still is isolative to room for long intervals. Patient has flat affect and appears depressed, although reports readiness for discharge. Patient is medication compliant and verbalizes no concerns with his medications. 15 minute checks maintained for patient safety. Will continue to monitor and provide support and reassurance as needed. Problem: Self Harm/Suicidality  Goal: Will have no self-injury during hospital stay  Description: INTERVENTIONS:  1. Q 30 MINUTES: Routine safety checks  2. Q SHIFT & PRN: Assess risk to determine if routine checks are adequate to maintain patient safety  8/26/2022 2036 by Ofe Raymond LPN  Outcome: Progressing   Patient remains free from self harm and currently denies any suicidal thoughts. Patient agrees to seek staff if thoughts arise. 15 minute checks maintained for patient safety. Will continue to monitor and provide support and reassurance as needed.     Problem: Depression  Goal: Will be euthymic at discharge  Description: INTERVENTIONS:  1. Administer medication as ordered  2. Provide emotional support via 1:1 interaction with staff  3. Encourage involvement in milieu/groups/activities  4.  Monitor for social isolation  8/26/2022 2036 by Ro Guerra LPN  Outcome: Progressing

## 2022-08-27 NOTE — GROUP NOTE
Group Therapy Note    Date: 8/27/2022    Group Start Time: 1430  Group End Time: 4528  Group Topic: Healthy Living/Wellness    [facility] Nnamdi Saldana LPN; Domonique Bar LPN        Group Therapy Note    Attendees: 8/12       Patient's Goal:      Notes:      Status After Intervention:  Improved    Participation Level:  Active Listener    Participation Quality: Appropriate      Speech:  normal      Thought Process/Content: Logical      Affective Functioning: Congruent      Mood: euthymic      Level of consciousness:  Alert and Oriented x4      Response to Learning: Able to verbalize current knowledge/experience      Endings: None Reported    Modes of Intervention: Socialization      Discipline Responsible: Licensed Practical Nurse      Signature:  Domonique Bar LPN

## 2022-08-27 NOTE — PLAN OF CARE
Problem: Depression/Self Harm  Goal: Effect of psychiatric condition will be minimized and patient will be protected from self harm  Description: INTERVENTIONS:  1. Assess impact of patient's symptoms on level of functioning, self care needs and offer support as indicated  2. Assess patient/family knowledge of depression, impact on illness and need for teaching  3. Provide emotional support, presence and reassurance  4. Assess for possible suicidal thoughts or ideation. If patient expresses suicidal thoughts or statements do not leave alone, initiate Suicide Precautions, move to a room close to the nursing station and obtain sitter  5. Initiate consults as appropriate with Mental Health Professional, Spiritual Care, Psychosocial CNS, and consider a recommendation to the [facility]IP for a Psychiatric Consultation  8/27/2022 0947 by Radha Kessler LPN  Outcome: Progressing   Patient denies any thoughts of self harm. Every 15 min checks maintained for pt safety.   Problem: Self Harm/Suicidality  Goal: Will have no self-injury during hospital stay  Description: INTERVENTIONS:  1. Q 30 MINUTES: Routine safety checks  2. Q SHIFT & PRN: Assess risk to determine if routine checks are adequate to maintain patient safety  8/27/2022 0947 by Radha Kessler LPN  Outcome: Progressing

## 2022-08-27 NOTE — PROGRESS NOTES
2D echo from 8/26/22 reviewed, showing LVEF 52% with no valvular abnormalities. Pt is acceptable for discharge from a cardiac standpoint, on his current medications including amlodipine and chlorthalidone. Please have him contact [facility] at [phone] to arrange a f/u appointment in 4 weeks. Will sign-off; please call for any questions.

## 2022-08-27 NOTE — PLAN OF CARE
[facility]  Day 3 Interdisciplinary Treatment Plan NOTE    Review Date & Time: 8/27/22 0928    Admission Type:   Admission Type: Voluntary    Reason for admission:  Reason for Admission: Patient was admitted for suicidal ideations with no specific plan. Patient reports that he has been off his medication for a month. Patient states the past couple of weeks he has been depressed and stated \"I feel like a failure\". Patient reports that he has a past attempt of suicide with a plan by hanging.   Estimated Length of Stay: 5-7 days  Estimated Discharge Date Update: to be determined by physician    PATIENT STRENGTHS:  Patient Strengths    Patient Strengths and Limitations:Limitations: Multiple barriers to leisure interests, Difficulty problem solving/relies on others to help solve problems, Tendency to isolate self, Lacks leisure interests, Inappropriate/potentially harmful leisure interests  Addictive Behavior:Addictive Behavior  In the Past 3 Months, Have You Felt or Has Someone Told You That You Have a Problem With  : None  Medical Problems:  Past Medical History:   Diagnosis Date    Asthma     Back pain     Drug-induced mood disorder ([facility] [address]     Hypertension     PVC (premature ventricular contraction)     Seasonal allergies     Torn earlobe 2014    bilateral gauged earlobe       Risk:  Fall Risk   Jax Scale Jax Scale Score: 22  BVC    Change in scores no Changes to plan of Care no    Status EXAM:   Mental Status and Behavioral Exam  Normal: No  Level of Assistance: Independent/Self  Facial Expression: Flat  Affect: Appropriate  Level of Consciousness: Alert  Frequency of Checks: 4 times per hour, close  Mood:Normal: No  Mood: Depressed  Motor Activity:Normal: No  Motor Activity: Decreased  Eye Contact: Good  Observed Behavior: Cooperative  Sexual Misconduct History: Current - no  Preception: [place] to person, [place] to time, [place] to place, [place] to situation  Attention:Normal: Yes  Attention: Distractible  Thought Processes: Circumstantial  Thought Content:Normal: No  Thought Content: Preoccupations  Depression Symptoms: Loss of interest, Isolative  Anxiety Symptoms: Generalized  Luann Symptoms: No problems reported or observed. Hallucinations: None  Delusions: No  Memory:Normal: Yes  Insight and Judgment: No  Insight and Judgment: Poor judgment, Poor insight, Unmotivated    Daily Assessment Last Entry:   Daily Sleep (WDL): Within Defined Limits            Daily Nutrition (WDL): Within Defined Limits  Level of Assistance: Independent/Self    Patient Monitoring:  Frequency of Checks: 4 times per hour, close    Psychiatric Symptoms:   Depression Symptoms  Depression Symptoms: Loss of interest, Isolative  Anxiety Symptoms  Anxiety Symptoms: Generalized  Luann Symptoms  Luann Symptoms: No problems reported or observed. Suicide Risk CSSR-S:  1) Within the past month, have you wished you were dead or wished you could go to sleep and not wake up? : Yes  2) Have you actually had any thoughts of killing yourself? : Yes  3) Have you been thinking about how you might kill yourself? : Yes  5) Have you started to work out or worked out the details of how to kill yourself? Do you intend to carry out this plan? : No  6) Have you ever done anything, started to do anything, or prepared to do anything to end your life?: No  Change in Result NO Change in Plan of care NO      EDUCATION:   EDUCATION:   Learner Progress Toward Treatment Goals: Reviewed results and recommendations of this team, Reviewed group plan and strategies, Reviewed signs, symptoms and risk of self harm and violent behavior, Reviewed goals and plan of care    Method:small group, individual verbal education    Outcome:verbalized by patient, but needs reinforcement to obtain goals    PATIENT GOALS:  Short term:Continue medication compliance and go home to see children. Long term:Compliant with outpatient treatment and spend time with family. PLAN/TREATMENT RECOMMENDATIONS UPDATE: continue with group therapies, increased socialization, continue planning for after discharge goals, continue with medication compliance    SHORT-TERM GOALS UPDATE:   Time frame for Short-Term Goals: 5-7 days    LONG-TERM GOALS UPDATE:   Time frame for Long-Term Goals: 6 months  Members Present in Team Meeting: See Signature Sheet    Adama Olvera RN

## 2022-08-28 VITALS
HEART RATE: 75 BPM | TEMPERATURE: 97.6 F | OXYGEN SATURATION: 97 % | BODY MASS INDEX: 36.57 KG/M2 | RESPIRATION RATE: 14 BRPM | HEIGHT: 72 IN | SYSTOLIC BLOOD PRESSURE: 129 MMHG | WEIGHT: 270 LBS | DIASTOLIC BLOOD PRESSURE: 76 MMHG

## 2022-08-28 PROCEDURE — 99238 HOSP IP/OBS DSCHRG MGMT 30/<: CPT | Performed by: PSYCHIATRY & NEUROLOGY

## 2022-08-28 PROCEDURE — 6370000000 HC RX 637 (ALT 250 FOR IP): Performed by: PSYCHIATRY & NEUROLOGY

## 2022-08-28 RX ORDER — TRAZODONE HYDROCHLORIDE 50 MG/1
50 TABLET ORAL NIGHTLY PRN
Qty: 30 TABLET | Refills: 0 | Status: SHIPPED | OUTPATIENT
Start: 2022-08-28

## 2022-08-28 RX ORDER — PAROXETINE HYDROCHLORIDE 20 MG/1
20 TABLET, FILM COATED ORAL EVERY MORNING
Qty: 30 TABLET | Refills: 3 | Status: SHIPPED | OUTPATIENT
Start: 2022-08-28

## 2022-08-28 RX ORDER — ARIPIPRAZOLE 5 MG/1
5 TABLET ORAL DAILY
Qty: 30 TABLET | Refills: 3 | Status: SHIPPED | OUTPATIENT
Start: 2022-08-28

## 2022-08-28 RX ADMIN — ARIPIPRAZOLE 5 MG: 5 TABLET ORAL at 07:36

## 2022-08-28 RX ADMIN — CHLORTHALIDONE 50 MG: 25 TABLET ORAL at 07:36

## 2022-08-28 RX ADMIN — AMLODIPINE BESYLATE 10 MG: 10 TABLET ORAL at 07:36

## 2022-08-28 RX ADMIN — CETIRIZINE HYDROCHLORIDE 10 MG: 10 TABLET, FILM COATED ORAL at 07:36

## 2022-08-28 RX ADMIN — PAROXETINE HYDROCHLORIDE 20 MG: 20 TABLET, FILM COATED ORAL at 07:36

## 2022-08-28 NOTE — DISCHARGE SUMMARY
DISCHARGE SUMMARY      Patient ID:  Kartik Mcgovern  [id number]  12 y.o.  1995    Admit date: 8/24/2022    Discharge date and time: 8/28/2022    Disposition: Home      Admitting Physician: Ran Olea MD     Discharge Physician: Dr Jose Mendez MD    Admission Diagnoses: Depression with suicidal ideation [F32. A, R45.851]    Admission Condition: poor    Discharged Condition: stable    Admission Circumstance: Kartik Mcogvern is a 32 y.o. male who has a past medical history of mental illness, alcohol use disorder, hypertension, PVCs, and cardiomyopathy who presents to the ER with increased depression and suicidal ideation with a plan stating \"I would do anything to get out of this life. \"  Torey Rodriguez is agreeable to assessment at bedside. He is somewhat evasive and although he is cooperative with interview he does not open up easily to this writer. When asked about events leading up to hospitalization patient reports that his depression has significantly increased over the past couple of months. He states that he has been noncompliant with his medication and states that he needs to get back on it. When asked about significant stressors in patient's life patient states \"oh yeah I have a lot going on. \"  When asked to elaborate further on this however patient is evasive. He does report that he is having financial issues and relationship issues with his current fiancé however would not go into further detail. \"  He reports that for the past month he has been feeling down and depressed all day, nearly every day. He endorses poor sleep stating that he struggles to fall asleep at night. He endorses significant anhedonia, poor energy and motivation, and decreased concentration. He denies any issues with his appetite. He does endorse significant feelings of hopelessness and helplessness. Torey Rodriguez endorses suicidal ideation however again is evasive and will not disclose his plan to this writer. He denies homicidal ideation.   At MEDICAL/PSYCHIATRIC HISTORY:   Past Medical History:   Diagnosis Date    Asthma     Back pain     Drug-induced mood disorder (HCC)     Hypertension     PVC (premature ventricular contraction)     Seasonal allergies     Torn earlobe 2014    bilateral gauged earlobe       FAMILY/SOCIAL HISTORY:  Family History   Problem Relation Age of Onset    Heart Disease Father     High Blood Pressure Father     Diabetes Father     Alcohol Abuse Father     Asthma Mother     Heart Disease Mother     Coronary Art Dis Maternal Grandfather     Coronary Art Dis Paternal Grandfather      Social History     Socioeconomic History    Marital status: Single     Spouse name: Not on file    Number of children: Not on file    Years of education: Not on file    Highest education level: Not on file   Occupational History    Not on file   Tobacco Use    Smoking status: Former     Packs/day: 1.00     Years: 1.00     Pack years: 1.00     Types: Cigarettes     Start date: 2013     Quit date: 2016     Years since quittin.1    Smokeless tobacco: Current     Types: Snuff, Chew    Tobacco comments:     Discussed dental care for oral cancer detection and prevention. Discussed risks for tobacco / nicotine. Vaping Use    Vaping Use: Never used   Substance and Sexual Activity    Alcohol use: Yes     Alcohol/week: 0.0 standard drinks     Comment: occasional    Drug use: Not Currently     Types: Cocaine, Marijuana (Shalonda Sermon), Opiates , Other-see comments     Comment: No longer uses.   Hx of heroin use    Sexual activity: Yes   Other Topics Concern    Not on file   Social History Narrative    Not on file     Social Determinants of Health     Financial Resource Strain: Not on file   Food Insecurity: Not on file   Transportation Needs: Not on file   Physical Activity: Not on file   Stress: Not on file   Social Connections: Not on file   Intimate Partner Violence: Not on file   Housing Stability: Not on file       MEDICATIONS:    Current Facility-Administered Medications:     haloperidol lactate (HALDOL) injection 5 mg, 5 mg, IntraMUSCular, Q6H PRN **AND** LORazepam (ATIVAN) injection 2 mg, 2 mg, IntraMUSCular, Q6H PRN **AND** diphenhydrAMINE (BENADRYL) injection 50 mg, 50 mg, IntraMUSCular, Q6H PRN, Eduardo Herbert MD    acetaminophen (TYLENOL) tablet 650 mg, 650 mg, Oral, Q6H PRN, Eduardo Herbert MD    ibuprofen (ADVIL;MOTRIN) tablet 400 mg, 400 mg, Oral, Q6H PRN, Eduardo Herbert MD    hydrOXYzine HCl (ATARAX) tablet 50 mg, 50 mg, Oral, TID PRN, Eduardo Herbert MD, 50 mg at 08/27/22 2123    traZODone (DESYREL) tablet 50 mg, 50 mg, Oral, Nightly PRN, Eduardo Herbert MD, 50 mg at 08/27/22 2123    polyethylene glycol (GLYCOLAX) packet 17 g, 17 g, Oral, Daily PRN, Eduardo Herbert MD    aluminum & magnesium hydroxide-simethicone (MAALOX) 200-200-20 MG/5ML suspension 30 mL, 30 mL, Oral, Q6H PRN, Eduardo Herbert MD    nicotine polacrilex (NICORETTE) gum 2 mg, 2 mg, Oral, Q2H PRN, Eduardo Herbert MD    ARIPiprazole (ABILIFY) tablet 5 mg, 5 mg, Oral, Daily, Eduardo Herbert MD, 5 mg at 08/28/22 0736    cetirizine (ZYRTEC) tablet 10 mg, 10 mg, Oral, Daily, Eduardo Herbert MD, 10 mg at 08/28/22 0736    chlorthalidone (HYGROTON) tablet 50 mg, 50 mg, Oral, Daily, Eduardo Herbert MD, 50 mg at 08/28/22 0736    PARoxetine (PAXIL) tablet 20 mg, 20 mg, Oral, QAM, Eduardo Herbert MD, 20 mg at 08/28/22 0736    amLODIPine (NORVASC) tablet 10 mg, 10 mg, Oral, Daily, Eduardo Herbert MD, 10 mg at 08/28/22 0527    Examination:  /76   Pulse 75   Temp 97.6 °F (36.4 °C) (Oral)   Resp 14   Ht 6' (1.829 m)   Wt 270 lb (122.5 kg)   SpO2 97%   BMI 36.62 kg/m²   Gait - steady    HOSPITAL COURSE[de-identified]  Following admission to the hospital, patient had a complete physical exam and blood work up. The patient was referred to Internal Medicine.    Patient was monitored closely with suicide precaution  Patient was started on Abilify  Was encouraged to participate in group and other milieu activity  Patient started to feel better with this combination of treatment. Significant progress in the symptoms since admission. Mood is improved  The patient denies AVH or paranoid thoughts  The patient denies any hopelessness or worthlessness  No active SI/HI  Appetite:  [x] Normal  [] Increased  [] Decreased    Sleep:       [x] Normal  [] Fair       [] Poor            Energy:    [x] Normal  [] Increased  [] Decreased     SI [] Present  [x] Absent  HI  []Present  [x] Absent   Aggression:  [] yes  [] no  Patient is [x] able  [] unable to CONTRACT FOR SAFETY   Medication side effects(SE):  [x] None(Psych. Meds.) [] Other      Mental Status Examination on discharge:    Level of consciousness:  within normal limits   Appearance:  well-appearing  Behavior/Motor:  no abnormalities noted  Attitude toward examiner:  attentive and good eye contact  Speech:  spontaneous, normal rate and normal volume   Mood: euthymic  Affect:  mood congruent  Thought processes:  linear, goal directed, and coherent   Thought content:  Suicidal Ideation:  denies suicidal ideation  Delusions:  no evidence of delusions  Perceptual Disturbance:  denies any perceptual disturbance  Cognition:  oriented to person, place, and time   Concentration intact  Memory intact  Insight good   Judgement fair   Fund of Knowledge adequate      ASSESSMENT:  Patient symptoms are:  [x] Well controlled  [x] Improving  [] Worsening  [] No change      Diagnosis:  Principal Problem:    Major depressive disorder, recurrent severe without psychotic features ([facility]  Active Problems:    Depression with suicidal ideation    Alcohol use disorder, moderate, in controlled environment ([facility]    Essential hypertension    Cardiomyopathy, alcoholic ([facility]    History of cardiac radiofrequency ablation (RFA)  Resolved Problems:    * No resolved hospital problems. *      LABS:    No results for input(s): WBC, HGB, PLT in the last 72 hours.   No results for input(s): NA, K, CL, CO2, BUN, CREATININE, GLUCOSE in the last 72 hours. No results for input(s): BILITOT, ALKPHOS, AST, ALT in the last 72 hours. Lab Results   Component Value Date/Time    BARBSCNU NEGATIVE 08/24/2022 02:00 PM    LABBENZ NEGATIVE 08/24/2022 02:00 PM    LABMETH NEGATIVE 08/24/2022 02:00 PM     Lab Results   Component Value Date/Time    TSH 1.30 05/02/2021 09:34 AM     No results found for: LITHIUM  No results found for: VALPROATE, CBMZ    RISK ASSESSMENT AT DISCHARGE: Low risk for suicide and homicide. Treatment Plan:  Reviewed current Medications with the patient. Education provided on the complaince with treatment. Risks, benefits, side effects, drug-to-drug interactions and alternatives to treatment were discussed. Encourage patient to attend outpatient follow up appointment and therapy. Patient was advised to call the outpatient provider, visit the nearest ED or call 911 if symptoms are not manageable.         Medication List        CONTINUE taking these medications      ARIPiprazole 5 MG tablet  Commonly known as: ABILIFY  Take 1 tablet by mouth daily     PARoxetine 20 MG tablet  Commonly known as: PAXIL  Take 1 tablet by mouth every morning     traZODone 50 MG tablet  Commonly known as: DESYREL  Take 1 tablet by mouth nightly as needed for Depression            STOP taking these medications      amLODIPine 10 MG tablet  Commonly known as: NORVASC     cetirizine 10 MG tablet  Commonly known as: ZYRTEC               Where to Get Your Medications        These medications were sent to [address] [place] [address] [address] [facility], [address] [place] [address] [id number] [place] [address] [address]ST, [facility] [address]      Phone: [phone]   ARIPiprazole 5 MG tablet  PARoxetine 20 MG tablet  traZODone 50 MG tablet               Core Measures statement:   Not applicable                                           Ryanne Reyes is a 32 y.o. male being evaluated Mary Gamble MD on 8/28/2022 at 9:49 AM    An electronic signature was used to authenticate this note. **This report has been created using voice recognition software. It may contain minor errors which are inherent in voice recognition technology. **

## 2022-08-28 NOTE — PLAN OF CARE
Problem: Self Harm/Suicidality  Goal: Will have no self-injury during hospital stay  Description: INTERVENTIONS:  1. Q 30 MINUTES: Routine safety checks  2. Q SHIFT & PRN: Assess risk to determine if routine checks are adequate to maintain patient safety  8/28/2022 0859 by Shanda Galo LPN  Outcome: Progressing     Problem: Depression/Self Harm  Goal: Effect of psychiatric condition will be minimized and patient will be protected from self harm  Description: INTERVENTIONS:  1. Assess impact of patient's symptoms on level of functioning, self care needs and offer support as indicated  2. Assess patient/family knowledge of depression, impact on illness and need for teaching  3. Provide emotional support, presence and reassurance  4. Assess for possible suicidal thoughts or ideation. If patient expresses suicidal thoughts or statements do not leave alone, initiate Suicide Precautions, move to a room close to the nursing station and obtain sitter  5. Initiate consults as appropriate with Mental Health Professional, Spiritual Care, Psychosocial CNS, and consider a recommendation to the LIP for a Psychiatric Consultation  8/28/2022 0859 by Shanda Galo LPN  Outcome: Progressing   Every 15 min checks maintained for pt safety.

## 2022-08-28 NOTE — DISCHARGE INSTRUCTIONS
are sick  SOURCE - https://devi-christal.info/. html     Stay home except to get medical care   Stay home: People who are mildly ill with COVID-19 are able to isolate at home during their illness. You should restrict activities outside your home, except for getting medical care. Avoid public areas: Do not go to work, school, or public areas. Avoid public transportation: Avoid using public transportation, ride-sharing, or taxis. Separate yourself from other people and animals in your home   Stay away from others: As much as possible, you should stay in a specific room and away from other people in your home. Also, you should use a separate bathroom, if available. Limit contact with pets & animals: You should restrict contact with pets and other animals while you are sick with COVID-19, just like you would around other people. Although there have not been reports of pets or other animals becoming sick with COVID-19, it is still recommended that people sick with COVID-19 limit contact with animals until more information is known about the virus. When possible, have another member of your household care for your animals while you are sick. If you are sick with COVID-19, avoid contact with your pet, including petting, snuggling, being kissed or licked, and sharing food. If you must care for your pet or be around animals while you are sick, wash your hands before and after you interact with pets and wear a facemask. See COVID-19 and Animals for more information. Other considerations  The ill person should eat/be fed in their room if possible. Non-disposable  items used should be handled with gloves and washed with hot water or in a . Clean hands after handling used  items. If possible, dedicate a lined trash can for the ill person. Use gloves when removing garbage bags, handling, and disposing of trash.  Wash hands after handling or disposing of trash. Consider consulting with your local health department about trash disposal guidance if available. Information for Household Members and Caregivers of Someone who is Sick   Call ahead before visiting your doctor   Call ahead: If you have a medical appointment, call the healthcare provider and tell them that you have or may have COVID-19. This will help the healthcare provider's office take steps to keep other people from getting infected or exposed. Wear a facemask if you are sick   If you are sick: You should wear a facemask when you are around other people (e.g., sharing a room or vehicle) or pets and before you enter a healthcare provider's office. If you are caring for others: If the person who is sick is not able to wear a facemask (for example, because it causes trouble breathing), then people who live with the person who is sick should not stay in the same room with them, or they should wear a facemask if they enter a room with the person who is sick. Cover your coughs and sneezes   Cover: Cover your mouth and nose with a tissue when you cough or sneeze. Dispose: Throw used tissues in a lined trash can. Wash hands: Immediately wash your hands with soap and water for at least 20 seconds or, if soap and water are not available, clean your hands with an alcohol-based hand  that contains at least 60% alcohol. Clean your hands often   Wash hands: Wash your hands often with soap and water for at least 20 seconds, especially after blowing your nose, coughing, or sneezing; going to the bathroom; and before eating or preparing food. Hand : If soap and water are not readily available, use an alcohol-based hand  with at least 60% alcohol, covering all surfaces of your hands and rubbing them together until they feel dry. Soap and water: Soap and water are the best option if hands are visibly dirty.    Avoid touching: Avoid touching your eyes, nose, and mouth with unwashed hands. Handwashing Tips   Wet your hands with clean, running water (warm or cold), turn off the tap, and apply soap. Lather your hands by rubbing them together with the soap. Lather the backs of your hands, between your fingers, and under your nails. Scrub your hands for at least 20 seconds. Need a timer? Hum the [place] from beginning to end twice. Rinse your hands well under clean, running water. Dry your hands using a clean towel or air dry them. Avoid sharing personal household items   Do not share: You should not share dishes, drinking glasses, cups, eating utensils, towels, or bedding with other people or pets in your home. Wash thoroughly after use: After using these items, they should be washed thoroughly with soap and water. Clean all high-touch surfaces everyday   Clean and disinfect: Practice routine cleaning of high touch surfaces. High touch surfaces include counters, tabletops, doorknobs, bathroom fixtures, toilets, phones, keyboards, tablets, and bedside tables. Disinfect areas with bodily fluids: Also, clean any surfaces that may have blood, stool, or body fluids on them. Household : Use a household cleaning spray or wipe, according to the label instructions. Labels contain instructions for safe and effective use of the cleaning product including precautions you should take when applying the product, such as wearing gloves and making sure you have good ventilation during use of the product. Monitor your symptoms   Seek medical attention: Seek prompt medical attention if your illness is worsening     (e.g., difficulty breathing). Call your doctor: Before seeking care, call your healthcare provider and tell them that you have, or are being evaluated for, COVID-19. Wear a facemask when sick: Put on a facemask before you enter the facility.  These steps will help the healthcare provider's office to keep other people in the office or waiting room from getting infected or exposed. Alert health department: Ask your healthcare provider to call the local or state health department. Persons who are placed under active monitoring or facilitated self-monitoring should follow instructions provided by their local health department or occupational health professionals, as appropriate. Call 911 if you have a medical emergency: If you have a medical emergency and need to call 911, notify the dispatch personnel that you have, or are being evaluated for COVID-19. If possible, put on a facemask before emergency medical services arrive.

## 2022-08-28 NOTE — GROUP NOTE
Group Therapy Note    Date: 8/28/2022    Group Start Time: 0900  Group End Time: 0945  Group Topic: Group Documentation    SARAH Jacobo        Group Therapy Note    Attendees: 7/12       Patient's Goal:  D/C today, set up meds, attend groups    Notes:  goal setting/support group    Status After Intervention:  Improved    Participation Level:  Active Listener and Interactive    Participation Quality: Appropriate, Attentive, Sharing, and Supportive      Speech:  normal      Thought Process/Content: Logical      Affective Functioning: Congruent      Mood: anxious      Level of consciousness:  Alert, Oriented x4, and Attentive      Response to Learning: Able to verbalize current knowledge/experience, Able to verbalize/acknowledge new learning, Able to retain information, and Capable of insight      Endings: None Reported    Modes of Intervention: Education, Support, Socialization, Exploration, and Problem-solving      Discipline Responsible: [address], [facility] [employer]      Signature:  Daija Adorno

## 2022-08-28 NOTE — PLAN OF CARE
Problem: Depression/Self Harm  Goal: Effect of psychiatric condition will be minimized and patient will be protected from self harm  Description: INTERVENTIONS:  1. Assess impact of patient's symptoms on level of functioning, self care needs and offer support as indicated  2. Assess patient/family knowledge of depression, impact on illness and need for teaching  3. Provide emotional support, presence and reassurance  4. Assess for possible suicidal thoughts or ideation. If patient expresses suicidal thoughts or statements do not leave alone, initiate Suicide Precautions, move to a room close to the nursing station and obtain sitter  5. Initiate consults as appropriate with Mental Health Professional, Spiritual Care, Psychosocial CNS, and consider a recommendation to the LIP for a Psychiatric Consultation  Outcome: Progressing     Problem: Self Harm/Suicidality  Goal: Will have no self-injury during hospital stay  Description: INTERVENTIONS:  1. Q 30 MINUTES: Routine safety checks  2. Q SHIFT & PRN: Assess risk to determine if routine checks are adequate to maintain patient safety  Outcome: Progressing   Denies suicidal thoughts and remains free from harm at this time. Pt is out to the day room t/o  the evening, remains aloof to peers.

## 2022-08-28 NOTE — BH NOTE
[facility]  Discharge Note    Pt discharged with followings belongings:   Dental Appliances: None  Vision - Corrective Lenses: None  Hearing Aid: None  Jewelry: None  Body Piercings Removed: No  Clothing: Pants, Shirt, Footwear, Shorts  Other Valuables: Other (Comment) (Vape device, Metal cord ballard)   Valuables sent home withpatient or returned to patient. Patient education on aftercare instructions: yes  Information faxed to [facility] by staff  at 11:52 AM .Patient verbalize understanding of AVS:  yes. Status EXAM upon discharge:  Mental Status and Behavioral Exam  Normal: No  Level of Assistance: Independent/Self  Facial Expression: Flat  Affect: Appropriate  Level of Consciousness: Alert  Frequency of Checks: 4 times per hour, close  Mood:Normal: No  Mood: Depressed  Motor Activity:Normal: Yes  Motor Activity: Decreased  Eye Contact: Fair  Observed Behavior: Friendly, Cooperative  Sexual Misconduct History: Current - no  Preception: [employer] to person, [employer] to time, [employer] to place, [employer] to situation  Attention:Normal: Yes  Attention: Distractible  Thought Processes: Circumstantial  Thought Content:Normal: No  Thought Content: Preoccupations  Depression Symptoms: Loss of interest  Anxiety Symptoms: Generalized  Luann Symptoms: No problems reported or observed.   Hallucinations: None  Delusions: No  Memory:Normal: Yes  Insight and Judgment: No  Insight and Judgment: Poor judgment, Poor insight, Unmotivated    Tobacco Screening:  Practical Counseling, on admission, pankaj X, if applicable and completed (first 3 are required if patient doesn't refuse)yes:            (x ) Recognizing danger situations (included triggers and roadblocks)                    ( x) Coping skills (new ways to manage stress,relaxation techniques, changing routine, distraction)                                                           ( x) Basic information about quitting (benefits of quitting, techniques in how to quit,

## 2022-08-28 NOTE — GROUP NOTE
Group Therapy Note    Date: 8/28/2022    Group Start Time: 1030  Group End Time: 1115  Group Topic: Psychoeducation    SARAH BHI CORINA Martinez, ROSARIO        Group Therapy Note    Attendees: 8/12         Patient's Goal:  Increase interpersonal relationship skills    Notes:  Patient was an active participant in group discussion    Status After Intervention:  Improved    Participation Level:  Active Listener and Interactive    Participation Quality: Appropriate, Attentive, and Sharing      Speech:  normal      Thought Process/Content: Logical  Linear      Affective Functioning: Congruent      Mood: euthymic      Level of consciousness:  Alert, Oriented x4, and Attentive      Response to Learning: Able to verbalize current knowledge/experience, Able to verbalize/acknowledge new learning, Able to retain information, and Capable of insight      Endings: None Reported    Modes of Intervention: Support, Socialization, and Exploration      Discipline Responsible: /Counselor      Signature:  CORINA Cordoba, ROSARIO

## 2022-08-28 NOTE — BH NOTE
Patient given tobacco quitline number [phone] at this time, refusing to call at this time, states \" I just dont want to quit now\"- patient given information as to the dangers of long term tobacco use. Continue to reinforce the importance of tobacco cessation.

## 2022-10-14 RX ORDER — CHLORTHALIDONE 50 MG/1
TABLET ORAL
Qty: 30 TABLET | Refills: 0 | OUTPATIENT
Start: 2022-10-14

## 2022-11-10 RX ORDER — PAROXETINE HYDROCHLORIDE 20 MG/1
20 TABLET, FILM COATED ORAL EVERY MORNING
Qty: 30 TABLET | Refills: 3 | Status: SHIPPED | OUTPATIENT
Start: 2022-11-10

## 2022-12-14 NOTE — TELEPHONE ENCOUNTER
- History of Present Illness


Time Seen by Provider: 12/14/22 08:00


Historian: patient


Exam Limitations: no limitations


Patient Subjective Stated Complaint: Patient states "I started having right 

sided chest pain at 0400 this morning. I vomited once. I woke up shaking and my 

chest was hurting."


Triage Nursing Assessment: Patient ambulated to ER bed with steady but slumped 

over gait. Alert and oriented x3. Accompanied by brother in law who helps take 

care of patient. No apparent respiratory distress. Patient calm and cooperative.

Reports right sided chest pain that does not radiate. States he vomited one time

this morning and had some dizziness this morning. Reported that chest pain comes

and goes.


Physician History: 





Patient is a 73-year-old male who presents with a complaint of chest pain he 

awoke from sleep with it this morning he was quite dizzy he was trembling had 

some nausea vomited once.  He also was diaphoretic he is on Xarelto for his 

pacemaker defibrillator.  He has multiple risk factors including hypertension 

family history and unknown lipid status.  His cardiologist is Dr. Wesley show


Timing/Duration: today


Activities at Onset: sleep


Quality: aching, pressure


Location: substernal


Chest Pain Radiation: no radiation


Severity of Pain-Max: moderate (6 of 10)


Severity of Pain-Current: mild


Associated Symptoms: nausea, vomiting, diaphoresis, weakness


Prior Chest Pain/Cardiac Workup: cardiac cath


Nitro Today/Relief: no nitro taken today


Aspirin Treatment Today: no aspirin today


Allergies/Adverse Reactions: 








penicillin G Allergy (Verified 12/14/22 07:51)


   





Home Medications: 








Omeprazole 20 MG [Prilosec 20 mg] 40 mg PO DAILY 07/27/13 [History]


Amiodarone HCl [Pacerone] 200 mg PO DAILY 04/09/22 [History]


Carvedilol [Coreg] 25 mg PO DAILY 04/09/22 [History]


Finasteride 5 mg*** [Proscar 5 MG***] 5 mg PO DAILY 04/09/22 [History]


Pravastatin Sodium 20 mg PO DAILY 04/09/22 [History]


Rivaroxaban [Xarelto] 20 mg PO DAILY 04/09/22 [History]


Spironolactone 25 mg*** [Aldactone 25 MG***] 25 mg PO DAILY 04/09/22 [History]


Tamsulosin HCl 0.4 mg*** [Flomax 0.4 MG***] 0.4 mg PO DAILY 04/09/22 [History]


Famotidine 40 mg PO DAILY 12/14/22 [History]





Hx Tetanus, Diphtheria Vaccination/Date Given: No


Hx Influenza Vaccination/Date Given: Yes


Hx Pneumococcal Vaccination/Date Given: Yes


Immunizations Up to Date: Yes





Travel Risk





- International Travel


Have you traveled outside of the country in past 3 weeks: No





- Coronavirus Screening


Are you exhibiting any of the following symptoms?: No


Close contact with a COVID-19 positive Pt in past 14-21 Days: No





- Vaccine Status


Have you recieved a Covid-19 vaccination: Yes


: Pfizer





- Vaccination Dates


Date of 2cond Vaccination (if applicable): unknown





- Review of Systems


Constitutional: No Fever, No Chills


Eyes: No Symptoms


Ears, Nose, & Throat: No Symptoms


Respiratory: No Cough, No Dyspnea


Cardiac: Chest Pain, No Edema, No Syncope


Abdominal/Gastrointestinal: Nausea, Vomiting, No Abdominal Pain, No Diarrhea


Genitourinary Symptoms: No Dysuria


Musculoskeletal: No Back Pain, No Neck Pain


Skin: No Rash


Neurological: Vertigo, No Dizziness, No Focal Weakness, No Sensory Changes


Psychological: No Symptoms


Endocrine: No Symptoms


All Other Systems: Reviewed and Negative





- Past Medical History


Pertinent Past Medical History: Yes


Cardiac History: Coronary Artery Disease, Hypertension


Respiratory History: COPD


Musculoskeletal History: Arthritis


GI Medical History: GERD


Other Medical History: cancer-colon





- Past Surgical History


Past Surgical History: Yes


Other Surgical History: part of colon removed.  pacemaker





- Social History


Smoking Status: Former smoker


Exposure to second hand smoke: Yes


Drug Use: none


Patient Lives Alone: No (with brother)





- Nursing Vital Signs


Nursing Vital Signs: 


                               Initial Vital Signs











Temperature  97.5 F   12/14/22 07:51


 


Pulse Rate  63   12/14/22 07:51


 


Respiratory Rate  18   12/14/22 07:51


 


Blood Pressure  121/64   12/14/22 07:51


 


O2 Sat by Pulse Oximetry  97   12/14/22 07:51








                                   Pain Scale











Pain Intensity                 6

















- Physical Exam


General Appearance: no apparent distress, alert


Eye Exam: PERRL/EOMI, eyes nml inspection


Ears, Nose, Throat Exam: normal ENT inspection, moist mucous membranes


Neck Exam: normal inspection, non-tender, supple, full range of motion


Respiratory Exam: normal breath sounds, lungs clear, No respiratory distress


Cardiovascular Exam: regular rate/rhythm, normal heart sounds


Gastrointestinal/Abdomen Exam: soft, No tenderness, No mass


Back Exam: normal inspection, No CVA tenderness, No vertebral tenderness


Extremity Exam: normal inspection, normal range of motion


Neurologic Exam: alert, oriented x 3, cooperative, normal mood/affect, sensation

 nml, other (Patient seems slow), No motor deficits


Skin Exam: normal color, warm, dry


SpO2: 97





- Course


Nursing assessment & vital signs reviewed: Yes


EKG Interpreted by Me: RATE ([id number]), Sinus Rhythm, NORMAL AXIS, LAFB, Right Bundle 

Branch Block, Non-specific ST Changes


Ordered Tests: 


                               Active Orders 24 hr











 Category Date Time Status


 


 EKG-ER Only STAT Care  12/14/22 08:05 Active


 


 IV Insertion STAT Care  12/14/22 08:05 Active


 


 CHEST 1 VIEW (PORTABLE) Stat Exams  12/14/22 08:05 Completed


 


 AMYLASE Stat Lab  12/14/22 07:55 Completed


 


 CBC W DIFF Stat Lab  12/14/22 07:55 Completed


 


 CMP Stat Lab  12/14/22 07:55 Completed


 


 D-DIMER QUANTITATIVE Stat Lab  12/14/22 07:55 Completed


 


 LIPASE Stat Lab  12/14/22 07:55 Completed


 


 Lactic Acid Stat Lab  12/14/22 08:15 Completed


 


 MAGNESIUM Stat Lab  12/14/22 07:55 Completed


 


 NT PRO BNP Stat Lab  12/14/22 07:55 Completed


 


 PROTIME WITH INR Stat Lab  12/14/22 07:55 Completed


 


 PTT Stat Lab  12/14/22 07:55 Completed


 


 TROPONIN Q4H Lab  12/14/22 07:55 Completed


 


 TROPONIN Q4H Lab  12/14/22 10:00 Completed


 


 TROPONIN Q4H Lab  12/14/22 16:15 Ordered








Medication Summary











Generic Name Dose Route Start Last Admin





  Trade Name Freq  PRN Reason Stop Dose Admin


 


Sodium Chloride  1,000 mls @ 100 mls/hr  12/14/22 08:15  12/14/22 08:23





  Sodium Chloride 0.9% 1000 Ml  IV  01/13/23 08:14  100 mls/hr





  .Q10H DARWIN   Administration














Discontinued Medications














Generic Name Dose Route Start Last Admin





  Trade Name Freq  PRN Reason Stop Dose Admin


 


Aspirin  324 mg  12/14/22 08:20  12/14/22 08:23





  Aspirin 81 Mg Tab.Chew  PO  12/14/22 08:21  324 mg





  STAT ONE   Administration


 


Aspirin  Confirm  12/14/22 08:21 





  Aspirin 81 Mg Tab.Chew  Administered  12/14/22 08:22 





  Dose  





  324 mg  





  .ROUTE  





  .STK-MED ONE  











Lab/Rad Data: 


                           Laboratory Result Diagrams





                                 12/14/22 07:55 





                                 12/14/22 07:55 





                               Laboratory Results











  12/14/22 12/14/22 12/14/22 Range/Units





  10:00 08:25 08:15 


 


WBC     (4.0-10.5)  x10^3/uL


 


RBC     (4.1-5.6)  x10^6/uL


 


Hgb     (12.5-18.0)  g/dL


 


Hct     (42-50)  %


 


MCV     ()  fL


 


MCH     (26-32)  pg


 


MCHC     (32-36)  g/dL


 


RDW     (11.5-14.0)  %


 


Plt Count     (150-450)  x10^3/uL


 


MPV     (7.5-11.0)  fL


 


Gran %     (36.0-66.0)  %


 


Immature Gran % (Auto)     (0.00-0.4)  %


 


Nucleat RBC Rel Count     (0.00-0.1)  %


 


Eos # (Auto)     (0-0.5)  x10^3/uL


 


Immature Gran # (Auto)     (0.00-0.03)  x10^3u/L


 


Absolute Lymphs (auto)     (1.0-4.6)  x10^3/uL


 


Absolute Monos (auto)     (0.0-1.3)  x10^3/uL


 


Absolute Nucleated RBC     (0.00-0.01)  x10^3u/L


 


Lymphocytes %     (24.0-44.0)  %


 


Monocytes %     (0.0-12.0)  %


 


Eosinophils %     (0.00-5.0)  %


 


Basophils %     (0.0-0.4)  %


 


Absolute Granulocytes     (1.4-6.9)  x10^3/uL


 


Basophils #     (0-0.4)  x10^3/uL


 


PT     (9.4-12.5)  SECONDS


 


INR     (0.8-3.0)  


 


APTT     (25.1-36.5)  SECONDS


 


D-Dimer     (0.0-0.50)  mg/L


 


Sodium     (137-145)  mmol/L


 


Potassium     (3.5-5.1)  mmol/L


 


Chloride     ()  mmol/L


 


Carbon Dioxide     (22-30)  mmol/L


 


Anion Gap     (5-15)  MEQ/L


 


BUN     (9-20)  mg/dL


 


Creatinine     (0.66-1.25)  mg/dL


 


Estimated GFR     ML/MIN


 


Glucose     ()  mg/dL


 


Lactic Acid    2.0  (0.4-2.0)  


 


Calcium     (8.4-10.2)  mg/dL


 


Magnesium     (1.6-2.3)  mg/dL


 


Total Bilirubin     (0.2-1.3)  mg/dL


 


AST     (17-59)  U/L


 


ALT     (0-50)  U/L


 


Alkaline Phosphatase     ()  U/L


 


Troponin I  < 0.012    (0.000-0.034)  ng/mL


 


NT-Pro-B Natriuret Pep     (0-900)  pg/mL


 


Serum Total Protein     (6.3-8.2)  g/dL


 


Albumin     (3.5-5.0)  g/dL


 


Amylase     ()  U/L


 


Lipase     ()  U/L


 


Influenza Type A Ag   NEGATIVE   (NEGATIVE)  


 


Influenza Type B Ag   NEGATIVE   (NEGATIVE)  


 


RSV (PCR)   NEGATIVE   (Negative)  


 


SARS-CoV-2 (PCR)   NEGATIVE   (NEGATIVE)  














  12/14/22 12/14/22 12/14/22 Range/Units





  07:55 07:55 07:55 


 


WBC     (4.0-10.5)  x10^3/uL


 


RBC     (4.1-5.6)  x10^6/uL


 


Hgb     (12.5-18.0)  g/dL


 


Hct     (42-50)  %


 


MCV     ()  fL


 


MCH     (26-32)  pg


 


MCHC     (32-36)  g/dL


 


RDW     (11.5-14.0)  %


 


Plt Count     (150-450)  x10^3/uL


 


MPV     (7.5-11.0)  fL


 


Gran %     (36.0-66.0)  %


 


Immature Gran % (Auto)     (0.00-0.4)  %


 


Nucleat RBC Rel Count     (0.00-0.1)  %


 


Eos # (Auto)     (0-0.5)  x10^3/uL


 


Immature Gran # (Auto)     (0.00-0.03)  x10^3u/L


 


Absolute Lymphs (auto)     (1.0-4.6)  x10^3/uL


 


Absolute Monos (auto)     (0.0-1.3)  x10^3/uL


 


Absolute Nucleated RBC     (0.00-0.01)  x10^3u/L


 


Lymphocytes %     (24.0-44.0)  %


 


Monocytes %     (0.0-12.0)  %


 


Eosinophils %     (0.00-5.0)  %


 


Basophils %     (0.0-0.4)  %


 


Absolute Granulocytes     (1.4-6.9)  x10^3/uL


 


Basophils #     (0-0.4)  x10^3/uL


 


PT   18.2 H   (9.4-12.5)  SECONDS


 


INR   1.81   (0.8-3.0)  


 


APTT   43.3 H   (25.1-36.5)  SECONDS


 


D-Dimer   < 0.19   (0.0-0.50)  mg/L


 


Sodium    136 L  (137-145)  mmol/L


 


Potassium    4.6  (3.5-5.1)  mmol/L


 


Chloride    107  ()  mmol/L


 


Carbon Dioxide    21 L  (22-30)  mmol/L


 


Anion Gap    12.0  (5-15)  MEQ/L


 


BUN    23 H  (9-20)  mg/dL


 


Creatinine    1.00  (0.66-1.25)  mg/dL


 


Estimated GFR    > 60.0  ML/MIN


 


Glucose    113 H  ()  mg/dL


 


Lactic Acid     (0.4-2.0)  


 


Calcium    8.5  (8.4-10.2)  mg/dL


 


Magnesium    2.0  (1.6-2.3)  mg/dL


 


Total Bilirubin    0.80  (0.2-1.3)  mg/dL


 


AST    34  (17-59)  U/L


 


ALT    23  (0-50)  U/L


 


Alkaline Phosphatase    85  ()  U/L


 


Troponin I  < 0.012    (0.000-0.034)  ng/mL


 


NT-Pro-B Natriuret Pep    334  (0-900)  pg/mL


 


Serum Total Protein    7.1  (6.3-8.2)  g/dL


 


Albumin    4.1  (3.5-5.0)  g/dL


 


Amylase    76  ()  U/L


 


Lipase    53  ()  U/L


 


Influenza Type A Ag     (NEGATIVE)  


 


Influenza Type B Ag     (NEGATIVE)  


 


RSV (PCR)     (Negative)  


 


SARS-CoV-2 (PCR)     (NEGATIVE)  














  12/14/22 Range/Units





  07:55 


 


WBC  6.3  (4.0-10.5)  x10^3/uL


 


RBC  4.23  (4.1-5.6)  x10^6/uL


 


Hgb  12.5  (12.5-18.0)  g/dL


 


Hct  38.3 L  (42-50)  %


 


MCV  90.5  ()  fL


 


MCH  29.6  (26-32)  pg


 


MCHC  32.6  (32-36)  g/dL


 


RDW  14.5 H  (11.5-14.0)  %


 


Plt Count  288  (150-450)  x10^3/uL


 


MPV  10.6  (7.5-11.0)  fL


 


Gran %  64.1  (36.0-66.0)  %


 


Immature Gran % (Auto)  0.3  (0.00-0.4)  %


 


Nucleat RBC Rel Count  0.0  (0.00-0.1)  %


 


Eos # (Auto)  0.27  (0-0.5)  x10^3/uL


 


Immature Gran # (Auto)  0.02  (0.00-0.03)  x10^3u/L


 


Absolute Lymphs (auto)  1.34  (1.0-4.6)  x10^3/uL


 


Absolute Monos (auto)  0.61  (0.0-1.3)  x10^3/uL


 


Absolute Nucleated RBC  0.00  (0.00-0.01)  x10^3u/L


 


Lymphocytes %  21.2 L  (24.0-44.0)  %


 


Monocytes %  9.6  (0.0-12.0)  %


 


Eosinophils %  4.3  (0.00-5.0)  %


 


Basophils %  0.5  (0.0-0.4)  %


 


Absolute Granulocytes  4.06  (1.4-6.9)  x10^3/uL


 


Basophils #  0.03  (0-0.4)  x10^3/uL


 


PT   (9.4-12.5)  SECONDS


 


INR   (0.8-3.0)  


 


APTT   (25.1-36.5)  SECONDS


 


D-Dimer   (0.0-0.50)  mg/L


 


Sodium   (137-145)  mmol/L


 


Potassium   (3.5-5.1)  mmol/L


 


Chloride   ()  mmol/L


 


Carbon Dioxide   (22-30)  mmol/L


 


Anion Gap   (5-15)  MEQ/L


 


BUN   (9-20)  mg/dL


 


Creatinine   (0.66-1.25)  mg/dL


 


Estimated GFR   ML/MIN


 


Glucose   ()  mg/dL


 


Lactic Acid   (0.4-2.0)  


 


Calcium   (8.4-10.2)  mg/dL


 


Magnesium   (1.6-2.3)  mg/dL


 


Total Bilirubin   (0.2-1.3)  mg/dL


 


AST   (17-59)  U/L


 


ALT   (0-50)  U/L


 


Alkaline Phosphatase   ()  U/L


 


Troponin I   (0.000-0.034)  ng/mL


 


NT-Pro-B Natriuret Pep   (0-900)  pg/mL


 


Serum Total Protein   (6.3-8.2)  g/dL


 


Albumin   (3.5-5.0)  g/dL


 


Amylase   ()  U/L


 


Lipase   ()  U/L


 


Influenza Type A Ag   (NEGATIVE)  


 


Influenza Type B Ag   (NEGATIVE)  


 


RSV (PCR)   (Negative)  


 


SARS-CoV-2 (PCR)   (NEGATIVE)  














- Progress


Progress: improved


Air Movement: good


Blood Culture(s) Obtained: No


Antibiotics given: No





- Departure


Departure Disposition: Home


Clinical Impression: 


 Atypical chest pain





Condition: Stable


Critical Care Time: No


Referrals: 


LENY BAEZ NP [Primary Care Provider] - Follow up/PCP as directed


Instructions:  Chest Pain (DC)


Prescriptions: 


Ondansetron ODT 4 MG*** [Zofran Odt 4 mg***] 4 mg PO Q6H PRN PRN #10 tablet


 PRN Reason: Vomiting LVM for pt to r/s missed appt

## 2023-01-19 RX ORDER — CHLORTHALIDONE 50 MG/1
TABLET ORAL
Qty: 30 TABLET | Refills: 0 | OUTPATIENT
Start: 2023-01-19

## 2023-03-06 RX ORDER — CHLORTHALIDONE 50 MG/1
TABLET ORAL
Qty: 30 TABLET | Refills: 0 | OUTPATIENT
Start: 2023-03-06

## 2023-10-10 NOTE — DISCHARGE INSTRUCTIONS
Do not drink energy drinks as frequently. PLEASE RETURN TO THE EMERGENCY DEPARTMENT IMMEDIATELY if your symptoms worsen in anyway or in 8-12 hours if not improved for re-evaluation. You should immediately return to the ER for symptoms such as increasing pain, shortness of breath, fever, a feeling of passing out, light headed, dizziness, abdominal pain, numbness or weakness to the arms or legs, coolness or color change of the arms or legs. Take your medication as indicated and prescribed. If you are given an antibiotic then, make sure you get the prescription filled and take the antibiotics until finished. Please understand that at this time there is no evidence for a more serious underlying process, but that early in the process of an illness or injury, an emergency department workup can be falsely reassuring. You should contact your family doctor within the next 24 hours for a follow up appointment    Surjit Moran!!!    From [facility] and [facility] Emergency Services    On behalf of the Emergency Department staff at [facility]), I would like to thank you for giving us the opportunity to address your health care needs and concerns. We hope that during your visit, our service was delivered in a professional and caring manner. Please keep [facility] in mind as we walk with you down the path to your own personal wellness. Please expect an automated text message or email from us so we can ask a few questions about your health and progress. Based on your answers, a clinician may call you back to offer help and instructions. Please understand that early in the process of an illness or injury, an emergency department workup can be falsely reassuring. If you notice any worsening, changing or persistent symptoms please call your family doctor or return to the ER immediately. Tell us how we did during your visit at [email or web address]   and let us know about your experience
Notify provider

## 2025-02-19 ENCOUNTER — APPOINTMENT (OUTPATIENT)
Dept: GENERAL RADIOLOGY | Age: 30
End: 2025-02-19
Payer: COMMERCIAL

## 2025-02-19 ENCOUNTER — HOSPITAL ENCOUNTER (EMERGENCY)
Age: 30
Discharge: HOME | End: 2025-02-19
Attending: EMERGENCY MEDICINE
Payer: COMMERCIAL

## 2025-02-19 VITALS
OXYGEN SATURATION: 96 % | SYSTOLIC BLOOD PRESSURE: 130 MMHG | HEIGHT: 72 IN | BODY MASS INDEX: 37.25 KG/M2 | RESPIRATION RATE: 21 BRPM | WEIGHT: 275 LBS | HEART RATE: 91 BPM | TEMPERATURE: 98.8 F | DIASTOLIC BLOOD PRESSURE: 81 MMHG

## 2025-02-19 DIAGNOSIS — R00.2 PALPITATIONS: ICD-10-CM

## 2025-02-19 DIAGNOSIS — R07.89 ATYPICAL CHEST PAIN: Primary | ICD-10-CM

## 2025-02-19 DIAGNOSIS — E87.6 HYPOKALEMIA: ICD-10-CM

## 2025-02-19 LAB
ANION GAP SERPL CALCULATED.3IONS-SCNC: 12 MMOL/L (ref 9–17)
BASOPHILS # BLD: 0.1 K/UL (ref 0–0.2)
BASOPHILS NFR BLD: 1 % (ref 0–2)
BUN SERPL-MCNC: 14 MG/DL (ref 6–20)
CALCIUM SERPL-MCNC: 9.2 MG/DL (ref 8.6–10.4)
CHLORIDE SERPL-SCNC: 105 MMOL/L (ref 98–107)
CO2 SERPL-SCNC: 23 MMOL/L (ref 20–31)
CREAT SERPL-MCNC: 0.9 MG/DL (ref 0.7–1.2)
EOSINOPHIL # BLD: 0.4 K/UL (ref 0–0.4)
EOSINOPHILS RELATIVE PERCENT: 4 % (ref 1–4)
ERYTHROCYTE [DISTWIDTH] IN BLOOD BY AUTOMATED COUNT: 12.8 % (ref 12.5–15.4)
GFR, ESTIMATED: >90 ML/MIN/1.73M2
GLUCOSE SERPL-MCNC: 136 MG/DL (ref 70–99)
HCT VFR BLD AUTO: 42.7 % (ref 41–53)
HGB BLD-MCNC: 15.1 G/DL (ref 13.5–17.5)
LYMPHOCYTES NFR BLD: 2 K/UL (ref 1–4.8)
LYMPHOCYTES RELATIVE PERCENT: 19 % (ref 24–44)
MAGNESIUM SERPL-MCNC: 1.8 MG/DL (ref 1.6–2.6)
MCH RBC QN AUTO: 29 PG (ref 26–34)
MCHC RBC AUTO-ENTMCNC: 35.3 G/DL (ref 31–37)
MCV RBC AUTO: 82.1 FL (ref 80–100)
MONOCYTES NFR BLD: 0.8 K/UL (ref 0.1–1.2)
MONOCYTES NFR BLD: 7 % (ref 2–11)
NEUTROPHILS NFR BLD: 69 % (ref 36–66)
NEUTS SEG NFR BLD: 7.4 K/UL (ref 1.8–7.7)
PLATELET # BLD AUTO: 188 K/UL (ref 140–450)
PMV BLD AUTO: 9.4 FL (ref 6–12)
POTASSIUM SERPL-SCNC: 3.4 MMOL/L (ref 3.7–5.3)
RBC # BLD AUTO: 5.2 M/UL (ref 4.5–5.9)
SODIUM SERPL-SCNC: 140 MMOL/L (ref 135–144)
TROPONIN I SERPL HS-MCNC: 9 NG/L (ref 0–22)
TSH SERPL DL<=0.05 MIU/L-ACNC: 2.29 UIU/ML (ref 0.3–5)
WBC OTHER # BLD: 10.6 K/UL (ref 3.5–11)

## 2025-02-19 PROCEDURE — 83735 ASSAY OF MAGNESIUM: CPT

## 2025-02-19 PROCEDURE — 6370000000 HC RX 637 (ALT 250 FOR IP): Performed by: EMERGENCY MEDICINE

## 2025-02-19 PROCEDURE — 85025 COMPLETE CBC W/AUTO DIFF WBC: CPT

## 2025-02-19 PROCEDURE — 93005 ELECTROCARDIOGRAM TRACING: CPT

## 2025-02-19 PROCEDURE — 71045 X-RAY EXAM CHEST 1 VIEW: CPT

## 2025-02-19 PROCEDURE — 84484 ASSAY OF TROPONIN QUANT: CPT

## 2025-02-19 PROCEDURE — 80048 BASIC METABOLIC PNL TOTAL CA: CPT

## 2025-02-19 PROCEDURE — 99285 EMERGENCY DEPT VISIT HI MDM: CPT

## 2025-02-19 PROCEDURE — 84443 ASSAY THYROID STIM HORMONE: CPT

## 2025-02-19 RX ORDER — POTASSIUM CHLORIDE 1500 MG/1
20 TABLET, EXTENDED RELEASE ORAL 2 TIMES DAILY
Qty: 10 TABLET | Refills: 0 | Status: SHIPPED | OUTPATIENT
Start: 2025-02-19 | End: 2025-02-24

## 2025-02-19 RX ORDER — POTASSIUM CHLORIDE 1500 MG/1
40 TABLET, EXTENDED RELEASE ORAL ONCE
Status: COMPLETED | OUTPATIENT
Start: 2025-02-19 | End: 2025-02-19

## 2025-02-19 RX ADMIN — POTASSIUM CHLORIDE 40 MEQ: 1500 TABLET, EXTENDED RELEASE ORAL at 23:00

## 2025-02-19 ASSESSMENT — ENCOUNTER SYMPTOMS
SHORTNESS OF BREATH: 0
GASTROINTESTINAL NEGATIVE: 1

## 2025-02-19 ASSESSMENT — PAIN - FUNCTIONAL ASSESSMENT: PAIN_FUNCTIONAL_ASSESSMENT: NONE - DENIES PAIN

## 2025-02-20 LAB
EKG ATRIAL RATE: 96 BPM
EKG P AXIS: 46 DEGREES
EKG P-R INTERVAL: 158 MS
EKG Q-T INTERVAL: 364 MS
EKG QRS DURATION: 106 MS
EKG QTC CALCULATION (BAZETT): 459 MS
EKG R AXIS: 23 DEGREES
EKG T AXIS: 74 DEGREES
EKG VENTRICULAR RATE: 96 BPM

## 2025-02-20 PROCEDURE — 93010 ELECTROCARDIOGRAM REPORT: CPT | Performed by: INTERNAL MEDICINE

## 2025-02-20 NOTE — ED PROVIDER NOTES
[facility] Emergency Department  [address]  [place] [facility] [address]  Phone: [phone]  Fax: [phone]      Pt Name: Phuc Olivares  MRN:[id number]  Birthdate 1995  Date of evaluation: 2/19/2025      CHIEF COMPLAINT       Chief Complaint   Patient presents with    Chest Pain     BIB EMS from home, sudden onset chest pain one hr ago; given 324 ASA, one nitro by EMS, pt reports no pain at this time; pt reports hx of PVC's, ablation performed in 2017       HISTORY OF PRESENT ILLNESS   29-year-old male presents for EMS after developing some sharp left chest pain and palpitations.  Felt unwell.  Has a history of PVCs with an ablation in 2017.  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  He admits to a history of heavy alcohol and drug use in the past.  Still drinks at least a sixpack of beer 5 days a week.  Has not seen cardiologist in many years.  Has been having some sharp pains in his chest for the past 6 months with no evaluation.  This current pain is pretty much almost gone.  Still just minimally reproducible.  Nonradiating.  Has history of hypertension.  Denies any PE risk factors.    REVIEW OF SYSTEMS     Review of Systems   Constitutional:  Negative for chills and fever.   Respiratory:  Negative for shortness of breath.    Cardiovascular:  Positive for chest pain and palpitations. Negative for leg swelling.   Gastrointestinal: Negative.    Neurological: Negative.    All other systems reviewed and are negative.        PAST MEDICAL HISTORY    has a past medical history of Asthma, Back pain, Drug-induced mood disorder (HCC), Hypertension, PVC (premature ventricular contraction), Seasonal allergies, and Torn earlobe.    SURGICAL HISTORY      has a past surgical history that includes External ear surgery and Ventricular ablation surgery (11/10/2017).    CURRENT MEDICATIONS       Discharge Medication List as of 2/19/2025 11:08 PM        CONTINUE these medications

## 2025-02-20 NOTE — DISCHARGE INSTRUCTIONS
Call the phone numbers included to get established with a family doctor as well as a cardiologist.  Take potassium morning and evening for next 5 days.  He will need to repeat lab work after finishing the medication a few days later.  I highly recommend you work on cutting down on your alcohol use or get help.  We are to help you here if you get rapidly worsening symptoms or new concerns.

## 2025-02-21 ENCOUNTER — TELEPHONE (OUTPATIENT)
Dept: PRIMARY CARE CLINIC | Age: 30
End: 2025-02-21

## 2025-02-21 NOTE — TELEPHONE ENCOUNTER
----- Message from Terrell HERNANDEZ sent at 2/21/2025 11:03 AM EST -----  Regarding: E[facility] Appointment Request  E[facility] Appointment Request    Patient needs appointment for E[facility] Appointment Type: Hospital Follow Up.    Patient Requested Dates(s): Friday next week  Patient Requested Time: Mid- morning schedule  Provider Name: Layne Mancia APRN - CNP     Reason for Appointment Request: Other - Unable to reach the practice   --------------------------------------------------------------------------------------------------------------------------    Relationship to Patient: Self     Call Back Information: OK to leave message on voicemail  Preferred Call Back Number: Phone - [phone]    Patient needs to schedule a Hospital follow up checkup. Patient got discharged last February 19, 2025.  
Called Pt and made appt for ED f/u  
None

## 2025-03-05 ENCOUNTER — OFFICE VISIT (OUTPATIENT)
Dept: PRIMARY CARE CLINIC | Age: 30
End: 2025-03-05
Payer: COMMERCIAL

## 2025-03-05 VITALS
WEIGHT: 286.8 LBS | BODY MASS INDEX: 38.9 KG/M2 | DIASTOLIC BLOOD PRESSURE: 82 MMHG | HEART RATE: 97 BPM | SYSTOLIC BLOOD PRESSURE: 134 MMHG | OXYGEN SATURATION: 98 % | RESPIRATION RATE: 18 BRPM

## 2025-03-05 DIAGNOSIS — L30.9 ECZEMA, UNSPECIFIED TYPE: ICD-10-CM

## 2025-03-05 DIAGNOSIS — H92.22 EAR BLEEDING, LEFT: ICD-10-CM

## 2025-03-05 DIAGNOSIS — I49.3 PVC (PREMATURE VENTRICULAR CONTRACTION): Primary | ICD-10-CM

## 2025-03-05 PROCEDURE — 99214 OFFICE O/P EST MOD 30 MIN: CPT | Performed by: NURSE PRACTITIONER

## 2025-03-05 PROCEDURE — 3079F DIAST BP 80-89 MM HG: CPT | Performed by: NURSE PRACTITIONER

## 2025-03-05 PROCEDURE — 3075F SYST BP GE 130 - 139MM HG: CPT | Performed by: NURSE PRACTITIONER

## 2025-03-05 RX ORDER — PREDNISONE 10 MG/1
10 TABLET ORAL DAILY
Qty: 10 TABLET | Refills: 0 | Status: SHIPPED | OUTPATIENT
Start: 2025-03-05 | End: 2025-03-15

## 2025-03-05 RX ORDER — CLOBETASOL PROPIONATE 0.5 MG/G
OINTMENT TOPICAL
Qty: 60 G | Refills: 1 | Status: SHIPPED | OUTPATIENT
Start: 2025-03-05

## 2025-03-05 RX ORDER — METHOCARBAMOL 750 MG/1
50000 TABLET ORAL WEEKLY
COMMUNITY
Start: 2024-04-10 | End: 2025-03-05 | Stop reason: ALTCHOICE

## 2025-03-05 SDOH — ECONOMIC STABILITY: FOOD INSECURITY: WITHIN THE PAST 12 MONTHS, THE FOOD YOU BOUGHT JUST DIDN'T LAST AND YOU DIDN'T HAVE MONEY TO GET MORE.: NEVER TRUE

## 2025-03-05 SDOH — ECONOMIC STABILITY: FOOD INSECURITY: WITHIN THE PAST 12 MONTHS, YOU WORRIED THAT YOUR FOOD WOULD RUN OUT BEFORE YOU GOT MONEY TO BUY MORE.: NEVER TRUE

## 2025-03-05 ASSESSMENT — PATIENT HEALTH QUESTIONNAIRE - PHQ9
SUM OF ALL RESPONSES TO PHQ QUESTIONS 1-9: 0
10. IF YOU CHECKED OFF ANY PROBLEMS, HOW DIFFICULT HAVE THESE PROBLEMS MADE IT FOR YOU TO DO YOUR WORK, TAKE CARE OF THINGS AT HOME, OR GET ALONG WITH OTHER PEOPLE: SOMEWHAT DIFFICULT
4. FEELING TIRED OR HAVING LITTLE ENERGY: NOT AT ALL
3. TROUBLE FALLING OR STAYING ASLEEP: NOT AT ALL
9. THOUGHTS THAT YOU WOULD BE BETTER OFF DEAD, OR OF HURTING YOURSELF: NOT AT ALL
2. FEELING DOWN, DEPRESSED OR HOPELESS: NOT AT ALL
7. TROUBLE CONCENTRATING ON THINGS, SUCH AS READING THE NEWSPAPER OR WATCHING TELEVISION: NOT AT ALL
5. POOR APPETITE OR OVEREATING: NOT AT ALL
1. LITTLE INTEREST OR PLEASURE IN DOING THINGS: NOT AT ALL
SUM OF ALL RESPONSES TO PHQ QUESTIONS 1-9: 0
6. FEELING BAD ABOUT YOURSELF - OR THAT YOU ARE A FAILURE OR HAVE LET YOURSELF OR YOUR FAMILY DOWN: NOT AT ALL
8. MOVING OR SPEAKING SO SLOWLY THAT OTHER PEOPLE COULD HAVE NOTICED. OR THE OPPOSITE, BEING SO FIGETY OR RESTLESS THAT YOU HAVE BEEN MOVING AROUND A LOT MORE THAN USUAL: NOT AT ALL

## 2025-03-05 NOTE — PROGRESS NOTES
[facility]  [facility]  [address] [place] [address] [address]  [address]  [facility] [address]  Dept: [phone]  Dept Fax: [phone]    Phuc Olivares is a 29 y.o. male who presentstoday for his medical conditions/complaints as noted below.  Phuc Olivares is c/o of  Chief Complaint   Patient presents with    ED Follow-up     Follow up from [place] [facility] 2/19/25 for Palpitations; Saw cardiology 2/28/25. Has stress test and echo scheduled    Other     Concern for rash on legs and wrists; itching         HPI:     History of Present Illness  The patient presents for an ER follow-up, rash on legs and wrists, and ear bleeding.    He experienced palpitations, which led to an inpatient cardiology consultation. He has been experiencing chest pain, both during physical activity and at rest, for some time. He acknowledges a high-stress lifestyle and significant caffeine intake. His sleep quality remains good. He was scheduled for a stress test and echocardiogram on 28th. He underwent a cardiac ablation procedure in 2017 due to tachycardia or arrhythmia. Despite the recommendation for biannual follow-ups with an electrophysiologist, he has not maintained this schedule.    He reports a rash on his legs and wrists, accompanied by itching. The cause of the rash is uncertain, but he suspects it may be related to his work environment. He does not have a history of eczema or psoriasis. He typically wears cowboy boots to work, which he covers with long socks, resulting in dry skin. He has not consulted a dermatologist. He reports no joint aches or pains. He also experiences seasonal allergies. He has previously used prescription ointment or steroid cream.    He reports an incident a few weeks ago where he noticed blood while cleaning his ears after a shower. He continues to observe minor traces of blood during his nightly ear cleaning routine but no pain.     FAMILY HISTORY  His two children have severe

## 2025-03-07 ENCOUNTER — HOSPITAL ENCOUNTER (OUTPATIENT)
Age: 30
Discharge: HOME | End: 2025-03-07
Payer: COMMERCIAL

## 2025-03-07 DIAGNOSIS — R07.9 CHEST PAIN, UNSPECIFIED TYPE: ICD-10-CM

## 2025-03-07 DIAGNOSIS — I10 ESSENTIAL HYPERTENSION: ICD-10-CM

## 2025-03-07 LAB
ALBUMIN SERPL-MCNC: 4.7 G/DL (ref 3.5–5.2)
ALBUMIN/GLOB SERPL: 1.6 {RATIO} (ref 1–2.5)
ALP SERPL-CCNC: 57 U/L (ref 40–129)
ALT SERPL-CCNC: 44 U/L (ref 10–50)
AST SERPL-CCNC: 20 U/L (ref 10–50)
BILIRUB DIRECT SERPL-MCNC: 0.2 MG/DL (ref 0–0.2)
BILIRUB INDIRECT SERPL-MCNC: 0.4 MG/DL (ref 0–1)
BILIRUB SERPL-MCNC: 0.6 MG/DL (ref 0–1.2)
CHOLEST SERPL-MCNC: 155 MG/DL (ref 0–199)
CHOLESTEROL/HDL RATIO: 3.5
GLOBULIN SER CALC-MCNC: 2.9 G/DL
HDLC SERPL-MCNC: 44 MG/DL
LDLC SERPL CALC-MCNC: 93 MG/DL (ref 0–100)
PROT SERPL-MCNC: 7.6 G/DL (ref 6.6–8.7)
TRIGL SERPL-MCNC: 89 MG/DL
VLDLC SERPL CALC-MCNC: 18 MG/DL (ref 1–30)

## 2025-03-07 PROCEDURE — 80061 LIPID PANEL: CPT

## 2025-03-07 PROCEDURE — 80076 HEPATIC FUNCTION PANEL: CPT

## 2025-03-07 PROCEDURE — 36415 COLL VENOUS BLD VENIPUNCTURE: CPT

## 2025-06-26 ENCOUNTER — HOSPITAL ENCOUNTER (OUTPATIENT)
Age: 30
Setting detail: SPECIMEN
Discharge: HOME | End: 2025-06-26
Payer: COMMERCIAL

## 2025-06-26 ENCOUNTER — HOSPITAL ENCOUNTER (OUTPATIENT)
Dept: CT IMAGING | Age: 30
Discharge: HOME | End: 2025-06-28
Payer: COMMERCIAL

## 2025-06-26 VITALS
OXYGEN SATURATION: 94 % | HEART RATE: 65 BPM | SYSTOLIC BLOOD PRESSURE: 130 MMHG | RESPIRATION RATE: 21 BRPM | DIASTOLIC BLOOD PRESSURE: 82 MMHG

## 2025-06-26 DIAGNOSIS — R07.9 CHEST PAIN, UNSPECIFIED TYPE: ICD-10-CM

## 2025-06-26 LAB
ANION GAP SERPL CALCULATED.3IONS-SCNC: 11 MMOL/L (ref 9–16)
BUN SERPL-MCNC: 14 MG/DL (ref 6–20)
CALCIUM SERPL-MCNC: 9.1 MG/DL (ref 8.6–10.4)
CHLORIDE SERPL-SCNC: 106 MMOL/L (ref 98–107)
CO2 SERPL-SCNC: 21 MMOL/L (ref 20–31)
CREAT SERPL-MCNC: 0.8 MG/DL (ref 0.7–1.2)
GFR, ESTIMATED: >90 ML/MIN/1.73M2
GLUCOSE SERPL-MCNC: 141 MG/DL (ref 74–99)
POTASSIUM SERPL-SCNC: 4.1 MMOL/L (ref 3.7–5.3)
SODIUM SERPL-SCNC: 138 MMOL/L (ref 136–145)

## 2025-06-26 PROCEDURE — 2500000003 HC RX 250 WO HCPCS: Performed by: NURSE PRACTITIONER

## 2025-06-26 PROCEDURE — 36415 COLL VENOUS BLD VENIPUNCTURE: CPT

## 2025-06-26 PROCEDURE — 6370000000 HC RX 637 (ALT 250 FOR IP): Performed by: NURSE PRACTITIONER

## 2025-06-26 PROCEDURE — 80048 BASIC METABOLIC PNL TOTAL CA: CPT

## 2025-06-26 PROCEDURE — 75574 CT ANGIO HRT W/3D IMAGE: CPT

## 2025-06-26 PROCEDURE — 6360000004 HC RX CONTRAST MEDICATION: Performed by: NURSE PRACTITIONER

## 2025-06-26 PROCEDURE — 2580000003 HC RX 258: Performed by: NURSE PRACTITIONER

## 2025-06-26 RX ORDER — METOPROLOL TARTRATE 1 MG/ML
2.5 INJECTION, SOLUTION INTRAVENOUS EVERY 5 MIN PRN
Status: DISCONTINUED | OUTPATIENT
Start: 2025-06-26 | End: 2025-06-29 | Stop reason: HOSPADM

## 2025-06-26 RX ORDER — SODIUM CHLORIDE 0.9 % (FLUSH) 0.9 %
10 SYRINGE (ML) INJECTION PRN
Status: DISCONTINUED | OUTPATIENT
Start: 2025-06-26 | End: 2025-06-29 | Stop reason: HOSPADM

## 2025-06-26 RX ORDER — METOPROLOL TARTRATE 50 MG
100 TABLET ORAL ONCE
Status: COMPLETED | OUTPATIENT
Start: 2025-06-26 | End: 2025-06-26

## 2025-06-26 RX ORDER — IOPAMIDOL 755 MG/ML
105 INJECTION, SOLUTION INTRAVASCULAR
Status: COMPLETED | OUTPATIENT
Start: 2025-06-26 | End: 2025-06-26

## 2025-06-26 RX ORDER — NITROGLYCERIN 0.4 MG/1
0.4 TABLET SUBLINGUAL ONCE
Status: DISCONTINUED | OUTPATIENT
Start: 2025-06-26 | End: 2025-06-29 | Stop reason: HOSPADM

## 2025-06-26 RX ORDER — 0.9 % SODIUM CHLORIDE 0.9 %
100 INTRAVENOUS SOLUTION INTRAVENOUS ONCE
Status: COMPLETED | OUTPATIENT
Start: 2025-06-26 | End: 2025-06-26

## 2025-06-26 RX ADMIN — METOPROLOL TARTRATE 2.5 MG: 1 INJECTION, SOLUTION INTRAVENOUS at 08:48

## 2025-06-26 RX ADMIN — SODIUM CHLORIDE, PRESERVATIVE FREE 10 ML: 5 INJECTION INTRAVENOUS at 09:19

## 2025-06-26 RX ADMIN — METOPROLOL TARTRATE 100 MG: 50 TABLET, FILM COATED ORAL at 07:53

## 2025-06-26 RX ADMIN — SODIUM CHLORIDE 100 ML: 9 INJECTION, SOLUTION INTRAVENOUS at 09:20

## 2025-06-26 RX ADMIN — METOPROLOL TARTRATE 2.5 MG: 1 INJECTION, SOLUTION INTRAVENOUS at 08:58

## 2025-06-26 RX ADMIN — IOPAMIDOL 105 ML: 755 INJECTION, SOLUTION INTRAVENOUS at 09:19

## 2025-06-26 ASSESSMENT — PAIN - FUNCTIONAL ASSESSMENT
PAIN_FUNCTIONAL_ASSESSMENT: NONE - DENIES PAIN

## 2025-08-07 ENCOUNTER — OFFICE VISIT (OUTPATIENT)
Dept: PRIMARY CARE CLINIC | Age: 30
End: 2025-08-07
Payer: COMMERCIAL

## 2025-08-07 VITALS
DIASTOLIC BLOOD PRESSURE: 80 MMHG | BODY MASS INDEX: 40.58 KG/M2 | HEART RATE: 68 BPM | RESPIRATION RATE: 15 BRPM | OXYGEN SATURATION: 98 % | SYSTOLIC BLOOD PRESSURE: 122 MMHG | WEIGHT: 299.2 LBS

## 2025-08-07 DIAGNOSIS — J30.2 SEASONAL ALLERGIES: ICD-10-CM

## 2025-08-07 DIAGNOSIS — E66.01 MORBID OBESITY WITH BMI OF 40.0-44.9, ADULT (HCC): ICD-10-CM

## 2025-08-07 DIAGNOSIS — R73.01 ELEVATED FASTING GLUCOSE: Primary | ICD-10-CM

## 2025-08-07 LAB — HBA1C MFR BLD: 5.3 %

## 2025-08-07 PROCEDURE — 3074F SYST BP LT 130 MM HG: CPT | Performed by: NURSE PRACTITIONER

## 2025-08-07 PROCEDURE — 3079F DIAST BP 80-89 MM HG: CPT | Performed by: NURSE PRACTITIONER

## 2025-08-07 PROCEDURE — 99214 OFFICE O/P EST MOD 30 MIN: CPT | Performed by: NURSE PRACTITIONER

## 2025-08-07 PROCEDURE — 83036 HEMOGLOBIN GLYCOSYLATED A1C: CPT | Performed by: NURSE PRACTITIONER

## 2025-08-07 RX ORDER — CETIRIZINE HYDROCHLORIDE 10 MG/1
10 TABLET ORAL DAILY
Qty: 90 TABLET | Refills: 1 | Status: SHIPPED | OUTPATIENT
Start: 2025-08-07

## 2025-08-07 RX ORDER — MONTELUKAST SODIUM 10 MG/1
10 TABLET ORAL NIGHTLY
Qty: 90 TABLET | Refills: 1 | Status: SHIPPED | OUTPATIENT
Start: 2025-08-07

## 2025-08-07 SDOH — ECONOMIC STABILITY: FOOD INSECURITY: WITHIN THE PAST 12 MONTHS, YOU WORRIED THAT YOUR FOOD WOULD RUN OUT BEFORE YOU GOT MONEY TO BUY MORE.: NEVER TRUE

## 2025-08-07 SDOH — ECONOMIC STABILITY: FOOD INSECURITY: WITHIN THE PAST 12 MONTHS, THE FOOD YOU BOUGHT JUST DIDN'T LAST AND YOU DIDN'T HAVE MONEY TO GET MORE.: NEVER TRUE

## 2025-08-07 ASSESSMENT — PATIENT HEALTH QUESTIONNAIRE - PHQ9
SUM OF ALL RESPONSES TO PHQ QUESTIONS 1-9: 0
7. TROUBLE CONCENTRATING ON THINGS, SUCH AS READING THE NEWSPAPER OR WATCHING TELEVISION: NOT AT ALL
1. LITTLE INTEREST OR PLEASURE IN DOING THINGS: NOT AT ALL
3. TROUBLE FALLING OR STAYING ASLEEP: NOT AT ALL
9. THOUGHTS THAT YOU WOULD BE BETTER OFF DEAD, OR OF HURTING YOURSELF: NOT AT ALL
SUM OF ALL RESPONSES TO PHQ QUESTIONS 1-9: 0
8. MOVING OR SPEAKING SO SLOWLY THAT OTHER PEOPLE COULD HAVE NOTICED. OR THE OPPOSITE, BEING SO FIGETY OR RESTLESS THAT YOU HAVE BEEN MOVING AROUND A LOT MORE THAN USUAL: NOT AT ALL
6. FEELING BAD ABOUT YOURSELF - OR THAT YOU ARE A FAILURE OR HAVE LET YOURSELF OR YOUR FAMILY DOWN: NOT AT ALL
10. IF YOU CHECKED OFF ANY PROBLEMS, HOW DIFFICULT HAVE THESE PROBLEMS MADE IT FOR YOU TO DO YOUR WORK, TAKE CARE OF THINGS AT HOME, OR GET ALONG WITH OTHER PEOPLE: NOT DIFFICULT AT ALL
SUM OF ALL RESPONSES TO PHQ QUESTIONS 1-9: 0
4. FEELING TIRED OR HAVING LITTLE ENERGY: NOT AT ALL
SUM OF ALL RESPONSES TO PHQ QUESTIONS 1-9: 0
2. FEELING DOWN, DEPRESSED OR HOPELESS: NOT AT ALL
5. POOR APPETITE OR OVEREATING: NOT AT ALL

## 2025-08-07 ASSESSMENT — ENCOUNTER SYMPTOMS
SORE THROAT: 0
DIARRHEA: 0
RHINORRHEA: 0
SHORTNESS OF BREATH: 0
CHEST TIGHTNESS: 0
COLOR CHANGE: 0
VOMITING: 0
ABDOMINAL PAIN: 0
NAUSEA: 0